# Patient Record
Sex: MALE | Race: WHITE | NOT HISPANIC OR LATINO | Employment: FULL TIME | URBAN - METROPOLITAN AREA
[De-identification: names, ages, dates, MRNs, and addresses within clinical notes are randomized per-mention and may not be internally consistent; named-entity substitution may affect disease eponyms.]

---

## 2017-02-07 ENCOUNTER — TELEPHONE (OUTPATIENT)
Dept: CARDIOLOGY | Facility: CLINIC | Age: 57
End: 2017-02-07

## 2017-02-07 NOTE — TELEPHONE ENCOUNTER
Dayanara ( wife) called pt is scheduled on Thursday 2/9/17 @ 8:00am.   She was wanting to know if you have a later appt after 10:00 am that day or another day.   Call back # [phone]   Thanks Pk SWANN

## 2017-02-08 NOTE — TELEPHONE ENCOUNTER
Called pt to change appt to later in the day.  Pt said yesterday he was told by scheduling that there were no appt until April.  Pt will just keep same appt since they straightened things out for tomorrow.  (done)

## 2017-02-09 ENCOUNTER — HOSPITAL ENCOUNTER (OUTPATIENT)
Facility: HOSPITAL | Age: 57
Setting detail: OBSERVATION
LOS: 1 days | Discharge: HOME | End: 2017-02-11
Attending: INTERNAL MEDICINE | Admitting: INTERNAL MEDICINE

## 2017-02-09 ENCOUNTER — APPOINTMENT (OUTPATIENT)
Dept: CT IMAGING | Facility: HOSPITAL | Age: 57
End: 2017-02-09

## 2017-02-09 ENCOUNTER — OFFICE VISIT (OUTPATIENT)
Dept: CARDIOLOGY | Facility: CLINIC | Age: 57
End: 2017-02-09

## 2017-02-09 ENCOUNTER — TELEPHONE (OUTPATIENT)
Dept: CARDIOLOGY | Facility: CLINIC | Age: 57
End: 2017-02-09

## 2017-02-09 VITALS
SYSTOLIC BLOOD PRESSURE: 108 MMHG | WEIGHT: 211 LBS | DIASTOLIC BLOOD PRESSURE: 78 MMHG | HEIGHT: 70 IN | HEART RATE: 53 BPM | BODY MASS INDEX: 30.21 KG/M2

## 2017-02-09 DIAGNOSIS — K92.2 GASTROINTESTINAL HEMORRHAGE, UNSPECIFIED GASTROINTESTINAL HEMORRHAGE TYPE: Primary | ICD-10-CM

## 2017-02-09 DIAGNOSIS — K92.1 BLOODY STOOL: ICD-10-CM

## 2017-02-09 DIAGNOSIS — I10 ESSENTIAL HYPERTENSION: ICD-10-CM

## 2017-02-09 DIAGNOSIS — I25.110 CORONARY ARTERY DISEASE INVOLVING NATIVE CORONARY ARTERY OF NATIVE HEART WITH UNSTABLE ANGINA PECTORIS (HCC): Primary | ICD-10-CM

## 2017-02-09 DIAGNOSIS — R07.2 PRECORDIAL PAIN: ICD-10-CM

## 2017-02-09 PROBLEM — R07.9 CHEST PAIN: Status: ACTIVE | Noted: 2017-02-09

## 2017-02-09 PROBLEM — I25.10 CAD (CORONARY ARTERY DISEASE): Status: ACTIVE | Noted: 2017-02-09

## 2017-02-09 LAB
ALBUMIN SERPL-MCNC: 4.2 G/DL (ref 3.5–5.2)
ALBUMIN/GLOB SERPL: 1.8 G/DL
ALP SERPL-CCNC: 52 U/L (ref 39–117)
ALT SERPL W P-5'-P-CCNC: 20 U/L (ref 1–41)
ANION GAP SERPL CALCULATED.3IONS-SCNC: 9.8 MMOL/L
AST SERPL-CCNC: 13 U/L (ref 1–40)
BASOPHILS # BLD AUTO: 0.04 10*3/MM3 (ref 0–0.2)
BASOPHILS NFR BLD AUTO: 0.4 % (ref 0–1.5)
BILIRUB SERPL-MCNC: 1.4 MG/DL (ref 0.1–1.2)
BUN BLD-MCNC: 8 MG/DL (ref 6–20)
BUN/CREAT SERPL: 8.8 (ref 7–25)
CALCIUM SPEC-SCNC: 9.4 MG/DL (ref 8.6–10.5)
CHLORIDE SERPL-SCNC: 101 MMOL/L (ref 98–107)
CO2 SERPL-SCNC: 28.2 MMOL/L (ref 22–29)
CREAT BLD-MCNC: 0.91 MG/DL (ref 0.76–1.27)
DEPRECATED RDW RBC AUTO: 42.7 FL (ref 37–54)
EOSINOPHIL # BLD AUTO: 0.5 10*3/MM3 (ref 0–0.7)
EOSINOPHIL NFR BLD AUTO: 5.1 % (ref 0.3–6.2)
ERYTHROCYTE [DISTWIDTH] IN BLOOD BY AUTOMATED COUNT: 13.1 % (ref 11.5–14.5)
FERRITIN SERPL-MCNC: 67.7 NG/ML (ref 30–400)
FOLATE SERPL-MCNC: >20 NG/ML (ref 4.78–24.2)
GFR SERPL CREATININE-BSD FRML MDRD: 87 ML/MIN/1.73
GLOBULIN UR ELPH-MCNC: 2.3 GM/DL
GLUCOSE BLD-MCNC: 89 MG/DL (ref 65–99)
HCT VFR BLD AUTO: 47.3 % (ref 40.4–52.2)
HGB BLD-MCNC: 16.4 G/DL (ref 13.7–17.6)
IMM GRANULOCYTES # BLD: 0.03 10*3/MM3 (ref 0–0.03)
IMM GRANULOCYTES NFR BLD: 0.3 % (ref 0–0.5)
IRON 24H UR-MRATE: 133 MCG/DL (ref 59–158)
IRON SATN MFR SERPL: 38 % (ref 20–50)
LYMPHOCYTES # BLD AUTO: 1.85 10*3/MM3 (ref 0.9–4.8)
LYMPHOCYTES NFR BLD AUTO: 18.7 % (ref 19.6–45.3)
MCH RBC QN AUTO: 31 PG (ref 27–32.7)
MCHC RBC AUTO-ENTMCNC: 34.7 G/DL (ref 32.6–36.4)
MCV RBC AUTO: 89.4 FL (ref 79.8–96.2)
MONOCYTES # BLD AUTO: 0.44 10*3/MM3 (ref 0.2–1.2)
MONOCYTES NFR BLD AUTO: 4.5 % (ref 5–12)
NEUTROPHILS # BLD AUTO: 7.02 10*3/MM3 (ref 1.9–8.1)
NEUTROPHILS NFR BLD AUTO: 71 % (ref 42.7–76)
NT-PROBNP SERPL-MCNC: 47.9 PG/ML (ref 5–900)
PLATELET # BLD AUTO: 225 10*3/MM3 (ref 140–500)
PMV BLD AUTO: 10.2 FL (ref 6–12)
POTASSIUM BLD-SCNC: 4.3 MMOL/L (ref 3.5–5.2)
PROT SERPL-MCNC: 6.5 G/DL (ref 6–8.5)
RBC # BLD AUTO: 5.29 10*6/MM3 (ref 4.6–6)
SODIUM BLD-SCNC: 139 MMOL/L (ref 136–145)
TIBC SERPL-MCNC: 352 MCG/DL
TRANSFERRIN SERPL-MCNC: 236 MG/DL (ref 200–360)
TROPONIN T SERPL-MCNC: <0.01 NG/ML (ref 0–0.03)
VIT B12 BLD-MCNC: 506 PG/ML (ref 211–946)
WBC NRBC COR # BLD: 9.88 10*3/MM3 (ref 4.5–10.7)

## 2017-02-09 PROCEDURE — 82746 ASSAY OF FOLIC ACID SERUM: CPT | Performed by: INTERNAL MEDICINE

## 2017-02-09 PROCEDURE — 82747 ASSAY OF FOLIC ACID RBC: CPT | Performed by: INTERNAL MEDICINE

## 2017-02-09 PROCEDURE — 84484 ASSAY OF TROPONIN QUANT: CPT | Performed by: INTERNAL MEDICINE

## 2017-02-09 PROCEDURE — 84466 ASSAY OF TRANSFERRIN: CPT | Performed by: INTERNAL MEDICINE

## 2017-02-09 PROCEDURE — 82607 VITAMIN B-12: CPT | Performed by: INTERNAL MEDICINE

## 2017-02-09 PROCEDURE — G0378 HOSPITAL OBSERVATION PER HR: HCPCS

## 2017-02-09 PROCEDURE — 85014 HEMATOCRIT: CPT | Performed by: INTERNAL MEDICINE

## 2017-02-09 PROCEDURE — 93000 ELECTROCARDIOGRAM COMPLETE: CPT | Performed by: INTERNAL MEDICINE

## 2017-02-09 PROCEDURE — 99220 PR INITIAL OBSERVATION CARE/DAY 70 MINUTES: CPT | Performed by: INTERNAL MEDICINE

## 2017-02-09 PROCEDURE — 80053 COMPREHEN METABOLIC PANEL: CPT | Performed by: INTERNAL MEDICINE

## 2017-02-09 PROCEDURE — 99244 OFF/OP CNSLTJ NEW/EST MOD 40: CPT | Performed by: INTERNAL MEDICINE

## 2017-02-09 PROCEDURE — 83880 ASSAY OF NATRIURETIC PEPTIDE: CPT | Performed by: INTERNAL MEDICINE

## 2017-02-09 PROCEDURE — 82728 ASSAY OF FERRITIN: CPT | Performed by: INTERNAL MEDICINE

## 2017-02-09 PROCEDURE — 74177 CT ABD & PELVIS W/CONTRAST: CPT

## 2017-02-09 PROCEDURE — 0 IOPAMIDOL 61 % SOLUTION: Performed by: INTERNAL MEDICINE

## 2017-02-09 PROCEDURE — 93010 ELECTROCARDIOGRAM REPORT: CPT | Performed by: INTERNAL MEDICINE

## 2017-02-09 PROCEDURE — 93005 ELECTROCARDIOGRAM TRACING: CPT | Performed by: INTERNAL MEDICINE

## 2017-02-09 PROCEDURE — 85025 COMPLETE CBC W/AUTO DIFF WBC: CPT | Performed by: INTERNAL MEDICINE

## 2017-02-09 PROCEDURE — 83540 ASSAY OF IRON: CPT | Performed by: INTERNAL MEDICINE

## 2017-02-09 RX ORDER — METOPROLOL SUCCINATE 50 MG/1
50 TABLET, EXTENDED RELEASE ORAL DAILY
Status: DISCONTINUED | OUTPATIENT
Start: 2017-02-09 | End: 2017-02-11 | Stop reason: HOSPADM

## 2017-02-09 RX ORDER — DIPHENOXYLATE HYDROCHLORIDE AND ATROPINE SULFATE 2.5; .025 MG/1; MG/1
1 TABLET ORAL DAILY
Status: DISCONTINUED | OUTPATIENT
Start: 2017-02-09 | End: 2017-02-11 | Stop reason: HOSPADM

## 2017-02-09 RX ORDER — ATORVASTATIN CALCIUM 10 MG/1
10 TABLET, FILM COATED ORAL NIGHTLY
Status: DISCONTINUED | OUTPATIENT
Start: 2017-02-09 | End: 2017-02-11 | Stop reason: HOSPADM

## 2017-02-09 RX ORDER — LANOLIN ALCOHOL/MO/W.PET/CERES
800 CREAM (GRAM) TOPICAL DAILY
Status: DISCONTINUED | OUTPATIENT
Start: 2017-02-09 | End: 2017-02-11 | Stop reason: HOSPADM

## 2017-02-09 RX ORDER — ISOSORBIDE MONONITRATE 30 MG/1
30 TABLET, EXTENDED RELEASE ORAL DAILY
Status: DISCONTINUED | OUTPATIENT
Start: 2017-02-09 | End: 2017-02-11 | Stop reason: HOSPADM

## 2017-02-09 RX ORDER — ASPIRIN 81 MG/1
81 TABLET, CHEWABLE ORAL DAILY
Status: DISCONTINUED | OUTPATIENT
Start: 2017-02-09 | End: 2017-02-11 | Stop reason: HOSPADM

## 2017-02-09 RX ORDER — LISINOPRIL 10 MG/1
10 TABLET ORAL DAILY
Status: DISCONTINUED | OUTPATIENT
Start: 2017-02-09 | End: 2017-02-11 | Stop reason: HOSPADM

## 2017-02-09 RX ORDER — POLYETHYLENE GLYCOL 3350, SODIUM CHLORIDE, POTASSIUM CHLORIDE, SODIUM BICARBONATE, AND SODIUM SULFATE 240; 5.84; 2.98; 6.72; 22.72 G/4L; G/4L; G/4L; G/4L; G/4L
2000 POWDER, FOR SOLUTION ORAL 2 TIMES DAILY
Status: COMPLETED | OUTPATIENT
Start: 2017-02-09 | End: 2017-02-10

## 2017-02-09 RX ORDER — SODIUM CHLORIDE 0.9 % (FLUSH) 0.9 %
1-10 SYRINGE (ML) INJECTION AS NEEDED
Status: DISCONTINUED | OUTPATIENT
Start: 2017-02-09 | End: 2017-02-11 | Stop reason: HOSPADM

## 2017-02-09 RX ADMIN — POLYETHYLENE GLYCOL 3350, SODIUM CHLORIDE, POTASSIUM CHLORIDE, SODIUM BICARBONATE, AND SODIUM SULFATE 2000 ML: 240; 5.84; 2.98; 6.72; 22.72 POWDER, FOR SOLUTION ORAL at 21:00

## 2017-02-09 RX ADMIN — LISINOPRIL 10 MG: 10 TABLET ORAL at 20:59

## 2017-02-09 RX ADMIN — ATORVASTATIN CALCIUM 10 MG: 10 TABLET, FILM COATED ORAL at 20:59

## 2017-02-09 RX ADMIN — IOPAMIDOL 85 ML: 612 INJECTION, SOLUTION INTRAVENOUS at 19:45

## 2017-02-09 NOTE — PROGRESS NOTES
Date of Office Visit: 2017  Encounter Provider: Hina Charles MD  Place of Service: [facility] CARDIOLOGY  Patient Name: Kev Mae  :1960    Chief complaint      History of Present Illness  The patient is a pleasant 56-year-old gentleman with hypertension, hyperlipidemia, carotid  artery disease, and coronary artery disease. In , he underwent coronary artery bypass  grafting. In  he had shortness of breath and palpitations. A stress test revealed  ST-T wave changes and apical ischemia along with normal systolic function. Cardiac  catheterization revealed inferobasilar dyskinesis with an 80% stenosis of the circumflex  artery. The right coronary artery had a 50-90% stenosis. The LIMA graft was occluded  proximally. The vein graft to the obtuse marginal branch was patent. The vein graft to  the right coronary artery was occluded. There was severe two-vessel disease (circumflex  and right coronary artery). The circumflex had native disease. The vein graft to he right  coronary artery was not patent. Angioplasty was considered but felt to be less than ideal  due to the size of this artery. Medical therapy was pursued. He then in early   developed some diplopia and had a 24-hour Holter monitor which was unremarkable except for  some PACs. In 7/15 he had a followup stress echo that showed normal left ventricle size and systolic function ejection fraction 56% grade 1 diastolic dysfunction, mild mitral regurgitation with abnormal stress echo with ischemia in the anterior wall.  He underwent cardiac catheterization that revealed moderate disease in the native LAD that was not obstructive.  The bypasses circumflex and right coronary artery had had a prior occlusion had revascularized.  He was treated medically.    He has not returned for follow-up since then.  He states he has at times missed taking many medications as he has forgotten.  He has had secondhand smoke  from his wife.  His lipids have not been checked in a year.  Despite this overall he had done relatively well until recently.  He has had intermittent episodes of black stools.  In December 2000 and fifth 16 he underwent hernia surgery by description this is a pleasant abdominal hernia repair with mesh being placed in the left lower abdomen.  He had recovered from this visit but has had recurrent lower abdominal pain intermittently over the past 3-4 weeks.  He has also had intermittent black stools especially in the past 2 days.  In this situation he has also had recurrent chest pain.  He states approximately 2-1/2 weeks ago he had midsternal chest pain was he was upset and angry this lasted for 10-15 minutes and resolved.  Genera 23rd of 2017 while he got dressed and was driving to work he developed a similar but more intense midsternal chest pain associated with nausea diaphoresis profuse sweating and weakness.  He was brought to the emergency room by paramedics.  Nitroglycerin relieved his pain and Route.  ER evaluation included negative troponin's no acute changes on his EKG.  A fasting glucose was 127.  He does not recall having had a chest x-ray but x-ray report showed no acute findings.  His hemoglobin was normal.  He was instructed to subsequently make an appointment.  He has had no interim symptoms since then.    Past Medical History   Diagnosis Date   • CAD (coronary artery disease)    • Carotid artery plaque    • Chest pain    • Diaphoresis    • Diplopia    • Hyperlipidemia    • Hypertension    • Nausea    • Past myocardial infarction    • SOB (shortness of breath)      Past Surgical History   Procedure Laterality Date   • Coronary artery bypass graft       X3   • Abdominal surgery       HERNIA REPAIR   • Rotator cuff repair     • Amputation digit       X2     No facility-administered medications prior to visit.      Outpatient Medications Prior to Visit   Medication Sig Dispense Refill   • aspirin 81 MG  tablet Take 1 tablet by mouth Daily.     • folic acid (FOLVITE) 800 MCG tablet Take 1 tablet by mouth Daily. As directed     • isosorbide mononitrate (IMDUR) 30 MG 24 hr tablet Take 1 tablet by mouth Daily.     • lisinopril (PRINIVIL,ZESTRIL) 10 MG tablet Take 1 tablet by mouth Daily.     • metoprolol succinate XL (TOPROL XL) 50 MG 24 hr tablet Take 1 tablet by mouth Daily.     • simvastatin (ZOCOR) 20 MG tablet Take 1 tablet by mouth Daily.       Allergies as of 02/09/2017   • (No Known Allergies)     Social History     Social History   • Marital status:      Spouse name: N/A   • Number of children: N/A   • Years of education: N/A     Occupational History   • Not on file.     Social History Main Topics   • Smoking status: Former Smoker     Quit date: 2/9/2009   • Smokeless tobacco: Not on file   • Alcohol use Yes      Comment: social drinker   • Drug use: Not on file   • Sexual activity: Not on file     Other Topics Concern   • Not on file     Social History Narrative     Family History   Problem Relation Age of Onset   • Heart attack Mother    • Heart attack Brother    • Deep vein thrombosis Cousin      with embolic death     Review of Systems   Constitution: Negative for fever, malaise/fatigue, weight gain and weight loss.   HENT: Negative for ear pain, hearing loss, nosebleeds and sore throat.    Eyes: Negative for double vision, pain, vision loss in left eye and vision loss in right eye.   Cardiovascular:        See history of present illness.   Respiratory: Positive for shortness of breath. Negative for cough, sleep disturbances due to breathing, snoring and wheezing.    Endocrine: Negative for cold intolerance, heat intolerance and polyuria.   Skin: Negative for itching, poor wound healing and rash.   Musculoskeletal: Negative for joint pain, joint swelling and myalgias.   Gastrointestinal: Negative for abdominal pain, diarrhea, hematochezia, nausea and vomiting.   Genitourinary: Negative for hematuria  "and hesitancy.   Neurological: Positive for dizziness. Negative for numbness, paresthesias and seizures.   Psychiatric/Behavioral: Negative for depression. The patient is not nervous/anxious.      Objective:     Vitals:    02/09/17 0804   BP: 108/78   Pulse: 53   Weight: 211 lb (95.7 kg)   Height: 70\" (177.8 cm)     Body mass index is 30.28 kg/(m^2).    Physical Exam   Constitutional: He is oriented to person, place, and time. He appears well-developed and well-nourished.   HENT:   Head: Normocephalic.   Nose: Nose normal.   Mouth/Throat: Oropharynx is clear and moist.   Eyes: Conjunctivae and EOM are normal. Pupils are equal, round, and reactive to light. Right eye exhibits no discharge. No scleral icterus.   Neck: Normal range of motion. Neck supple. No JVD present. No thyromegaly present.   Cardiovascular: Normal rate, regular rhythm, normal heart sounds and intact distal pulses.  Exam reveals no gallop and no friction rub.    No murmur heard.  Pulses:       Carotid pulses are 2+ on the right side, and 2+ on the left side.       Radial pulses are 2+ on the right side, and 2+ on the left side.        Femoral pulses are 2+ on the right side, and 2+ on the left side.       Popliteal pulses are 2+ on the right side, and 2+ on the left side.        Dorsalis pedis pulses are 2+ on the right side, and 2+ on the left side.        Posterior tibial pulses are 2+ on the right side, and 2+ on the left side.   Pulmonary/Chest: Effort normal and breath sounds normal. No respiratory distress. He has no wheezes. He has no rales.   Abdominal: Soft. Bowel sounds are normal. He exhibits no distension. There is no hepatosplenomegaly. There is no tenderness. There is no rebound.   Musculoskeletal: Normal range of motion. He exhibits no edema or tenderness.   Neurological: He is alert and oriented to person, place, and time.   Skin: Skin is warm and dry. No rash noted. No erythema.   Psychiatric: He has a normal mood and affect. His " behavior is normal. Judgment and thought content normal.   Vitals reviewed.    Lab Review:     ECG 12 Lead  Date/Time: 2/9/2017 10:05 PM  Performed by: MAGGIE CALL  Authorized by: MAGGIE CALL   Comparison: compared with previous ECG   Similar to previous ECG  Rhythm: sinus rhythm  Conduction: non-specific intraventricular conduction delay  Conduction comments: QTc =410 msec  Clinical impression: abnormal ECG          Assessment:       Diagnosis Plan   1. Coronary artery disease involving native coronary artery of native heart with unstable angina pectoris     2. Essential hypertension     3. Precordial pain     4. Bloody stool       Plan:       1.  Recurrent chest pain consistent with unstable angina unfortunately also associated with bleed.  We'll admit atrial fibrillation to hospital and asked GI to review and we'll need to consider cardiac catheterization unless he develops severe anemia which is doubtful.   2.  Coronary artery disease with history of coronary artery bypass grafting, as above  3.  Black stools concerning for GI bleed.  We'll asked GI to review of note is that he had a colonoscopy a year ago and had polyps that was subsequently removed.  He also had recent hernia surgery as described above  4.  Hernia surgery as above  5.  History of colonic polyps  6.  Dyslipidemia  7.  Elevated glucose we'll check hemoglobin A1c and serial Accu-Cheks and hospital  8.  Hypertension  9.  Secondary smoke of been exposure.  I discussed with the patient and wife that this should be avoided hopefully they can make some lifestyle changes      Kev Mae   Home Medication Instructions POOL:    Printed on:02/09/17 7538   Medication Information                      aspirin 81 MG tablet  Take 1 tablet by mouth Daily.             folic acid (FOLVITE) 800 MCG tablet  Take 1 tablet by mouth Daily. As directed             isosorbide mononitrate (IMDUR) 30 MG 24 hr tablet  Take 1 tablet by mouth Daily.              lisinopril (PRINIVIL,ZESTRIL) 10 MG tablet  Take 1 tablet by mouth Daily.             metoprolol succinate XL (TOPROL XL) 50 MG 24 hr tablet  Take 1 tablet by mouth Daily.             MULTIPLE VITAMIN PO  Take  by mouth.             simvastatin (ZOCOR) 20 MG tablet  Take 1 tablet by mouth Daily.               EMR Dragon/Transcription disclaimer:   Much of this encounter note is an electronic transcription/translation of spoken language to printed text. The electronic translation of spoken language may permit erroneous, or at times, nonsensical words or phrases to be inadvertently transcribed; Although I have reviewed the note for such errors, some may still exist.

## 2017-02-09 NOTE — CONSULTS
[facility]  Initial Inpatient Consult Note    Referring Provider: Dr Charles    Reason for Consultation: anemia    Subjective     History of present illness:  Pleasant 56-year-old male with a history of coronary artery disease, hernia repair 12/29/16, and a history of colon polyps admitted for seeing dark blood in his stool.  He was seen in the office visit today by Dr. Charles for recurrent chest pain with plans for a cardiac catheterization.  He reported maroon, dark red blood per rectum ×3 days and lower abdominal pain intermittently for 2 to 3 months but increasing in frequency.  He states for the past 3 mornings he has had dark, red blood coating his stool.  His abdominal pain is described as a dull ache with no specific triggering or alleviating factors. His current hemoglobin is 16.4 with a hematocrit of 47.3, and a normal WBC.  He takes low-dose aspirin daily but denies NSAID use or alcohol use.  He denies dysphagia, odynophagia, heartburn, reflux, weight loss, fever, chills, black tarry stool, rectal pain, or weight loss.  He denies a family history of colon cancer.  His last colonoscopy was 2-3 years ago at [facility] which he states was normal except for 2-3 polyps.  His current hemoglobin is 16.4 with a hematocrit of 47.3, and a normal WBC.     Past Medical History:  Past Medical History   Diagnosis Date   • CAD (coronary artery disease)    • Carotid artery plaque    • Chest heaviness    • Chest pain    • Diaphoresis    • Diplopia    • Hyperlipidemia    • Hypertension    • Nausea    • Past myocardial infarction    • SOB (shortness of breath)        Past Surgical History:  Past Surgical History   Procedure Laterality Date   • Coronary artery bypass graft       X3   • Abdominal surgery       HERNIA REPAIR   • Rotator cuff repair     • Amputation digit       X2        Social History:   Social History   Substance Use Topics   • Smoking status: Former Smoker     Quit date:  2/9/2009   • Smokeless tobacco: Not on file   • Alcohol use Yes      Comment: social drinker        Family History:  Family History   Problem Relation Age of Onset   • Heart attack Mother    • Heart attack Brother    • Deep vein thrombosis Cousin      with embolic death       Home Meds:  Prescriptions Prior to Admission   Medication Sig Dispense Refill Last Dose   • aspirin 81 MG tablet Take 1 tablet by mouth Daily.   2/8/2017 at Unknown time   • folic acid (FOLVITE) 800 MCG tablet Take 1 tablet by mouth Daily. As directed   2/8/2017 at Unknown time   • isosorbide mononitrate (IMDUR) 30 MG 24 hr tablet Take 1 tablet by mouth Daily.   2/8/2017 at Unknown time   • lisinopril (PRINIVIL,ZESTRIL) 10 MG tablet Take 1 tablet by mouth Daily.   2/8/2017 at Unknown time   • metoprolol succinate XL (TOPROL XL) 50 MG 24 hr tablet Take 1 tablet by mouth Daily.   2/8/2017 at Unknown time   • MULTIPLE VITAMIN PO Take  by mouth.   2/8/2017 at Unknown time   • simvastatin (ZOCOR) 20 MG tablet Take 1 tablet by mouth Daily.   2/8/2017 at Unknown time       Current Meds:     aspirin 81 mg Oral Daily   atorvastatin 10 mg Oral Nightly   folic acid 800 mcg Oral Daily   isosorbide mononitrate 30 mg Oral Daily   lisinopril 10 mg Oral Daily   metoprolol succinate XL 50 mg Oral Daily   multivitamin 1 tablet Oral Daily   polyethylene glycol with electrolytes 2,000 mL Oral BID       Allergies:  No Known Allergies    Review of Systems  All systems were reviewed and negative except for:  Gastrointestinal: postitive for  bright red blood per rectum and pain     Objective     Vital Signs  Temp:  [97.7 °F (36.5 °C)] 97.7 °F (36.5 °C)  Heart Rate:  [53-56] 56  Resp:  [16] 16  BP: (108-121)/(78-88) 121/88    Physical Exam:  General Appearance:    Alert, cooperative, in no acute distress   Head:    Normocephalic, without obvious abnormality, atraumatic   Eyes:            Lids and lashes normal, conjunctivae and sclerae normal, no   icterus   Throat:    No oral lesions, no thrush, oral mucosa moist   Neck:   No adenopathy, supple, trachea midline, no thyromegaly   Lungs:     Clear to auscultation,respirations regular, even and                   unlabored    Heart:    Regular rhythm and normal rate, normal S1 and S2, no            murmur, no gallop, no rub, no click   Chest Wall:    Prior sternotomy   Abdomen:     Normal bowel sounds, no masses, no organomegaly, soft        non-tender, non-distended, no guarding, no rebound                 tenderness   Rectal:     Deferred   Extremities:   no edema, no cyanosis, no redness   Skin:   No bleeding, bruising or rash   Lymph nodes:   No palpable adenopathy   Psychiatric:  Judgement and insight: normal   Orientation to person place and time: normal   Mood and affect: normal     Results Review:   I reviewed the patient's new clinical results.      Results from last 7 days  Lab Units 02/09/17  1305   WBC 10*3/mm3 9.88   HEMOGLOBIN g/dL 16.4   HEMATOCRIT % 47.3   PLATELETS 10*3/mm3 225         Results from last 7 days  Lab Units 02/09/17  1400   SODIUM mmol/L 139   POTASSIUM mmol/L 4.3   CHLORIDE mmol/L 101   TOTAL CO2 mmol/L 28.2   BUN mg/dL 8   CREATININE mg/dL 0.91   CALCIUM mg/dL 9.4   BILIRUBIN mg/dL 1.4*   ALK PHOS U/L 52   ALT (SGPT) U/L 20   AST (SGOT) U/L 13   GLUCOSE mg/dL 89             No results found for: LIPASE    Radiology:  Imaging Results (last 72 hours)     ** No results found for the last 72 hours. **          Assessment/Plan     Active Problems:    * No active hospital problems. *      1) maroon blood coating stool: Suggestive of anal outlet issue including hemorrhoids versus fissure versus ulcer.  Reassuringly, his hemoglobin is stable.    2) intermittent lower abdominal pain: He is status post hernia repair, I wonder if this is related to that    3) CAD status post CABG: Plans for cardiac catheterization    4) history of colon polyps: Colonoscopy 2-3 years ago    Plan:  -With upcoming plans for cardiac  catheterization, Dr. Charles would like evaluation of the lower GI bleed prior to undergoing this procedure.  I discussed the colonoscopy, the prep, the risks and benefits and the patient agrees to proceed  -Will get records of his last colonoscopy  -Daily H&H  -CT scan to evaluate abdominal pain  -No plans for EGD evaluation as he denies any upper GI symptoms, has no anemia, and does not describe melena    I discussed the patients findings and my recommendations with patient    Concepción Taylor MD  [facility]  02/09/17

## 2017-02-09 NOTE — H&P
Please refer to office note from today and apply to H and P.     Patient admitted with USA and anemia    Dr. Hina Charles

## 2017-02-09 NOTE — PLAN OF CARE
Problem: Patient Care Overview (Adult)  Goal: Plan of Care Review  Outcome: Ongoing (interventions implemented as appropriate)    02/09/17 9824   Coping/Psychosocial Response Interventions   Plan Of Care Reviewed With patient;spouse   Patient Care Overview   Progress no change   Outcome Evaluation   Outcome Summary/Follow up Plan pt admitted to  for angina and GIB. plan for cscope tomorrow and possible heart cath tomorrow as well. VSS. continue to monitor       Goal: Adult Individualization and Mutuality  Outcome: Ongoing (interventions implemented as appropriate)  Goal: Discharge Needs Assessment  Outcome: Ongoing (interventions implemented as appropriate)    Problem: Gastrointestinal Bleeding (Adult)  Goal: Signs and Symptoms of Listed Potential Problems Will be Absent or Manageable (Gastrointestinal Bleeding)  Outcome: Ongoing (interventions implemented as appropriate)    Problem: Pain, Acute (Adult)  Goal: Identify Related Risk Factors and Signs and Symptoms  Outcome: Outcome(s) achieved Date Met:  02/09/17  Goal: Acceptable Pain Control/Comfort Level  Outcome: Ongoing (interventions implemented as appropriate)

## 2017-02-10 ENCOUNTER — ANESTHESIA (OUTPATIENT)
Dept: GASTROENTEROLOGY | Facility: HOSPITAL | Age: 57
End: 2017-02-10

## 2017-02-10 ENCOUNTER — ANESTHESIA EVENT (OUTPATIENT)
Dept: GASTROENTEROLOGY | Facility: HOSPITAL | Age: 57
End: 2017-02-10

## 2017-02-10 LAB
ALBUMIN SERPL-MCNC: 3.9 G/DL (ref 3.5–5.2)
ALBUMIN/GLOB SERPL: 1.7 G/DL
ALP SERPL-CCNC: 50 U/L (ref 39–117)
ALT SERPL W P-5'-P-CCNC: 18 U/L (ref 1–41)
ANION GAP SERPL CALCULATED.3IONS-SCNC: 10.9 MMOL/L
AST SERPL-CCNC: 19 U/L (ref 1–40)
BASOPHILS # BLD AUTO: 0.04 10*3/MM3 (ref 0–0.2)
BASOPHILS NFR BLD AUTO: 0.5 % (ref 0–1.5)
BILIRUB SERPL-MCNC: 1.7 MG/DL (ref 0.1–1.2)
BUN BLD-MCNC: 8 MG/DL (ref 6–20)
BUN/CREAT SERPL: 8.2 (ref 7–25)
CALCIUM SPEC-SCNC: 9.3 MG/DL (ref 8.6–10.5)
CHLORIDE SERPL-SCNC: 101 MMOL/L (ref 98–107)
CO2 SERPL-SCNC: 27.1 MMOL/L (ref 22–29)
CREAT BLD-MCNC: 0.97 MG/DL (ref 0.76–1.27)
DEPRECATED RDW RBC AUTO: 43.6 FL (ref 37–54)
EOSINOPHIL # BLD AUTO: 0.46 10*3/MM3 (ref 0–0.7)
EOSINOPHIL NFR BLD AUTO: 6.1 % (ref 0.3–6.2)
ERYTHROCYTE [DISTWIDTH] IN BLOOD BY AUTOMATED COUNT: 13.2 % (ref 11.5–14.5)
FOLATE BLD-MCNC: >620 NG/ML
FOLATE RBC-MCNC: >1333 NG/ML
GFR SERPL CREATININE-BSD FRML MDRD: 81 ML/MIN/1.73
GLOBULIN UR ELPH-MCNC: 2.3 GM/DL
GLUCOSE BLD-MCNC: 95 MG/DL (ref 65–99)
HBA1C MFR BLD: 5.08 % (ref 4.8–5.6)
HCT VFR BLD AUTO: 46.4 % (ref 40.4–52.2)
HCT VFR BLD AUTO: 46.5 % (ref 37.5–51)
HGB BLD-MCNC: 15.9 G/DL (ref 13.7–17.6)
IMM GRANULOCYTES # BLD: 0 10*3/MM3 (ref 0–0.03)
IMM GRANULOCYTES NFR BLD: 0 % (ref 0–0.5)
INR PPP: 1.02 (ref 0.9–1.1)
LYMPHOCYTES # BLD AUTO: 1.44 10*3/MM3 (ref 0.9–4.8)
LYMPHOCYTES NFR BLD AUTO: 19 % (ref 19.6–45.3)
MCH RBC QN AUTO: 31.5 PG (ref 27–32.7)
MCHC RBC AUTO-ENTMCNC: 34.3 G/DL (ref 32.6–36.4)
MCV RBC AUTO: 91.9 FL (ref 79.8–96.2)
MONOCYTES # BLD AUTO: 0.49 10*3/MM3 (ref 0.2–1.2)
MONOCYTES NFR BLD AUTO: 6.5 % (ref 5–12)
NEUTROPHILS # BLD AUTO: 5.13 10*3/MM3 (ref 1.9–8.1)
NEUTROPHILS NFR BLD AUTO: 67.9 % (ref 42.7–76)
PLATELET # BLD AUTO: 183 10*3/MM3 (ref 140–500)
PMV BLD AUTO: 10.5 FL (ref 6–12)
POTASSIUM BLD-SCNC: 4.3 MMOL/L (ref 3.5–5.2)
PROT SERPL-MCNC: 6.2 G/DL (ref 6–8.5)
PROTHROMBIN TIME: 13 SECONDS (ref 11.7–14.2)
RBC # BLD AUTO: 5.05 10*6/MM3 (ref 4.6–6)
SODIUM BLD-SCNC: 139 MMOL/L (ref 136–145)
WBC NRBC COR # BLD: 7.56 10*3/MM3 (ref 4.5–10.7)

## 2017-02-10 PROCEDURE — 99224 PR SBSQ OBSERVATION CARE/DAY 15 MINUTES: CPT | Performed by: INTERNAL MEDICINE

## 2017-02-10 PROCEDURE — G0378 HOSPITAL OBSERVATION PER HR: HCPCS

## 2017-02-10 PROCEDURE — 85610 PROTHROMBIN TIME: CPT | Performed by: NURSE PRACTITIONER

## 2017-02-10 PROCEDURE — 94799 UNLISTED PULMONARY SVC/PX: CPT

## 2017-02-10 PROCEDURE — 25010000002 FENTANYL CITRATE (PF) 100 MCG/2ML SOLUTION: Performed by: INTERNAL MEDICINE

## 2017-02-10 PROCEDURE — 99226 PR SBSQ OBSERVATION CARE/DAY 35 MINUTES: CPT | Performed by: INTERNAL MEDICINE

## 2017-02-10 PROCEDURE — 0 IOPAMIDOL PER 1 ML: Performed by: INTERNAL MEDICINE

## 2017-02-10 PROCEDURE — 80053 COMPREHEN METABOLIC PANEL: CPT | Performed by: NURSE PRACTITIONER

## 2017-02-10 PROCEDURE — 93459 L HRT ART/GRFT ANGIO: CPT | Performed by: INTERNAL MEDICINE

## 2017-02-10 PROCEDURE — 25010000002 HEPARIN (PORCINE) PER 1000 UNITS: Performed by: INTERNAL MEDICINE

## 2017-02-10 PROCEDURE — 85025 COMPLETE CBC W/AUTO DIFF WBC: CPT | Performed by: NURSE PRACTITIONER

## 2017-02-10 PROCEDURE — 25010000002 MIDAZOLAM PER 1 MG: Performed by: INTERNAL MEDICINE

## 2017-02-10 PROCEDURE — 83036 HEMOGLOBIN GLYCOSYLATED A1C: CPT | Performed by: INTERNAL MEDICINE

## 2017-02-10 PROCEDURE — 85347 COAGULATION TIME ACTIVATED: CPT

## 2017-02-10 PROCEDURE — C1769 GUIDE WIRE: HCPCS | Performed by: INTERNAL MEDICINE

## 2017-02-10 PROCEDURE — 99152 MOD SED SAME PHYS/QHP 5/>YRS: CPT | Performed by: INTERNAL MEDICINE

## 2017-02-10 PROCEDURE — C1894 INTRO/SHEATH, NON-LASER: HCPCS | Performed by: INTERNAL MEDICINE

## 2017-02-10 RX ORDER — MIDAZOLAM HYDROCHLORIDE 1 MG/ML
INJECTION INTRAMUSCULAR; INTRAVENOUS AS NEEDED
Status: DISCONTINUED | OUTPATIENT
Start: 2017-02-10 | End: 2017-02-10 | Stop reason: HOSPADM

## 2017-02-10 RX ORDER — FENTANYL CITRATE 50 UG/ML
INJECTION, SOLUTION INTRAMUSCULAR; INTRAVENOUS AS NEEDED
Status: DISCONTINUED | OUTPATIENT
Start: 2017-02-10 | End: 2017-02-10 | Stop reason: HOSPADM

## 2017-02-10 RX ORDER — SODIUM CHLORIDE 9 MG/ML
INJECTION, SOLUTION INTRAVENOUS CONTINUOUS PRN
Status: DISCONTINUED | OUTPATIENT
Start: 2017-02-10 | End: 2017-02-10 | Stop reason: HOSPADM

## 2017-02-10 RX ORDER — LIDOCAINE HYDROCHLORIDE 20 MG/ML
INJECTION, SOLUTION INFILTRATION; PERINEURAL AS NEEDED
Status: DISCONTINUED | OUTPATIENT
Start: 2017-02-10 | End: 2017-02-10 | Stop reason: HOSPADM

## 2017-02-10 RX ORDER — SODIUM CHLORIDE 9 MG/ML
100 INJECTION, SOLUTION INTRAVENOUS CONTINUOUS
Status: DISCONTINUED | OUTPATIENT
Start: 2017-02-10 | End: 2017-02-10

## 2017-02-10 RX ORDER — SODIUM CHLORIDE, SODIUM LACTATE, POTASSIUM CHLORIDE, CALCIUM CHLORIDE 600; 310; 30; 20 MG/100ML; MG/100ML; MG/100ML; MG/100ML
30 INJECTION, SOLUTION INTRAVENOUS CONTINUOUS PRN
Status: DISCONTINUED | OUTPATIENT
Start: 2017-02-10 | End: 2017-02-10 | Stop reason: HOSPADM

## 2017-02-10 RX ORDER — SODIUM CHLORIDE 0.9 % (FLUSH) 0.9 %
1-10 SYRINGE (ML) INJECTION AS NEEDED
Status: DISCONTINUED | OUTPATIENT
Start: 2017-02-10 | End: 2017-02-11 | Stop reason: HOSPADM

## 2017-02-10 RX ORDER — SODIUM CHLORIDE 0.9 % (FLUSH) 0.9 %
1-10 SYRINGE (ML) INJECTION AS NEEDED
Status: DISCONTINUED | OUTPATIENT
Start: 2017-02-10 | End: 2017-02-10 | Stop reason: HOSPADM

## 2017-02-10 RX ADMIN — SODIUM CHLORIDE, POTASSIUM CHLORIDE, SODIUM LACTATE AND CALCIUM CHLORIDE 30 ML/HR: 600; 310; 30; 20 INJECTION, SOLUTION INTRAVENOUS at 12:50

## 2017-02-10 RX ADMIN — POLYETHYLENE GLYCOL 3350, SODIUM CHLORIDE, POTASSIUM CHLORIDE, SODIUM BICARBONATE, AND SODIUM SULFATE 2000 ML: 240; 5.84; 2.98; 6.72; 22.72 POWDER, FOR SOLUTION ORAL at 04:30

## 2017-02-10 RX ADMIN — Medication 1 TABLET: at 20:40

## 2017-02-10 RX ADMIN — ATORVASTATIN CALCIUM 10 MG: 10 TABLET, FILM COATED ORAL at 20:41

## 2017-02-10 RX ADMIN — ACETAMINOPHEN 800 MCG: 400 TABLET ORAL at 20:41

## 2017-02-10 RX ADMIN — ISOSORBIDE MONONITRATE 30 MG: 30 TABLET, EXTENDED RELEASE ORAL at 20:41

## 2017-02-10 RX ADMIN — ASPIRIN 81 MG: 81 TABLET, CHEWABLE ORAL at 20:40

## 2017-02-10 NOTE — PLAN OF CARE
Problem: Patient Care Overview (Adult)  Goal: Discharge Needs Assessment  Outcome: Ongoing (interventions implemented as appropriate)    02/10/17 3494   Discharge Needs Assessment   Concerns To Be Addressed no discharge needs identified

## 2017-02-10 NOTE — PLAN OF CARE
Problem: GI Endoscopy (Adult)  Goal: Signs and Symptoms of Listed Potential Problems Will be Absent or Manageable (GI Endoscopy)  Outcome: Ongoing (interventions implemented as appropriate)    02/10/17 1245   GI Endoscopy   Problems Assessed (GI Endoscopy) all   Problems Present (GI Endoscopy) none

## 2017-02-10 NOTE — DISCHARGE INSTRUCTIONS
[facility]  [address] [place] [address]    Coronary Angiogram (Radial/Ulnar Approach) After Care    Refer to this sheet in the next few weeks. These instructions provide you with information on caring for yourself after your procedure. Your caregiver may also give you more specific instructions. Your treatment has been planned according to current medical practices, but problems sometimes occur. Call your caregiver if you have any problems or questions after your procedure.    Home Care Instructions:  · You may shower the day after the procedure. Remove the bandage (dressing) and gently wash the site with plain soap and water. Gently pat the site dry. You may apply a band aid daily for 2 days if desired.    · Do not apply powder or lotion to the site.  · Do not submerge the affected site in water for 3 to 5 days or until the site is completely healed.   · Do not lift, push or pull anything over 10 pounds for 2 days after your procedure.  · Inspect the site at least twice daily. You may notice some bruising at the site and it may be tender for 1 to 2 weeks.     · Increase your fluid intake for the next 2 days.    · Keep arm elevated for 24 hours. For the remainder of the day, keep your arm in “Pledge of Allegiance” position when up and about.     · You may drive 24 hours after the procedure unless otherwise instructed by your caregiver.  · Do not operate machinery or power tools for 24 hours.  · A responsible adult should be with you for the first 24 hours after you arrive home. Do not make any important legal decisions or sign legal papers for 24 hours.      Call Your Doctor if:   · You have unusual pain at the radial/ulnar (wrist) site.  · You have redness, warmth, swelling, or pain at the radial/ulnar (wrist) site.  · You have drainage (other than a small amount of blood on the dressing).  · You have chills or a fever > 101.  · Your arm becomes pale or dark, cool, tingly, or numb.  · You  have heavy bleeding from the site, hold pressure on the site for 20 minutes.  If the bleeding stops, apply a fresh bandage and call your cardiologist.  However, if you continue to have bleeding, call 911.

## 2017-02-10 NOTE — PLAN OF CARE
Problem: Cardiac Catheterization with/without PCI (Adult)  Goal: Signs and Symptoms of Listed Potential Problems Will be Absent or Manageable (Cardiac Catheterization with/without PCI)  Outcome: Ongoing (interventions implemented as appropriate)    02/10/17 9345   Cardiac Catheterization with/without PCI   Problems Assessed (Cardiac Catheterization) all   Problems Present (Cardiac Catheterization) none

## 2017-02-10 NOTE — PLAN OF CARE
Problem: Patient Care Overview (Adult)  Goal: Plan of Care Review  Outcome: Ongoing (interventions implemented as appropriate)    02/10/17 7194   Coping/Psychosocial Response Interventions   Plan Of Care Reviewed With patient;spouse   Patient Care Overview   Progress improving   Outcome Evaluation   Outcome Summary/Follow up Plan ready for transfer

## 2017-02-10 NOTE — PLAN OF CARE
Problem: Patient Care Overview (Adult)  Goal: Plan of Care Review  Outcome: Ongoing (interventions implemented as appropriate)    02/09/17 1625 02/10/17 0516   Coping/Psychosocial Response Interventions   Plan Of Care Reviewed With patient;spouse --    Patient Care Overview   Progress no change --    Outcome Evaluation   Outcome Summary/Follow up Plan --  VSS. Plan for cscope and heart cath today. Started bowel prep. Will continue to monitor.        Goal: Adult Individualization and Mutuality  Outcome: Ongoing (interventions implemented as appropriate)  Goal: Discharge Needs Assessment  Outcome: Ongoing (interventions implemented as appropriate)    Problem: Gastrointestinal Bleeding (Adult)  Goal: Signs and Symptoms of Listed Potential Problems Will be Absent or Manageable (Gastrointestinal Bleeding)  Outcome: Ongoing (interventions implemented as appropriate)    Problem: Pain, Acute (Adult)  Goal: Acceptable Pain Control/Comfort Level  Outcome: Ongoing (interventions implemented as appropriate)

## 2017-02-10 NOTE — PROGRESS NOTES
[facility]  Inpatient Progress Note    Reason for Follow Up:  Rectal bleeding    Subjective     Interval History:   Pt brought to endoscopy for colonoscopy for evaluation of rectal bleeding.  Anesthesia reviewed his chart and found that there were plans for cardiac catheterization following the colonoscopy.  Concerns were also mentioned for his symptoms precipitating the cardiac catheterization.  A decision was made that sedation for the colonoscopy would not be done until after the cardiac catheterization.  Discussed this with the patient and he verbalized understanding of the concern of increased risk with sedation.  Patient subsequently went for cardiac catheterization.    Current Facility-Administered Medications:   •  aspirin chewable tablet 81 mg, 81 mg, Oral, Daily, Hina Charles MD, 81 mg at 02/09/17 1500  •  atorvastatin (LIPITOR) tablet 10 mg, 10 mg, Oral, Nightly, Hina Charels MD, 10 mg at 02/09/17 2059  •  folic acid (FOLVITE) tablet 800 mcg, 800 mcg, Oral, Daily, Hina Charles MD, 800 mcg at 02/09/17 1501  •  isosorbide mononitrate (IMDUR) 24 hr tablet 30 mg, 30 mg, Oral, Daily, Hina Charles MD, 30 mg at 02/09/17 1501  •  lactated ringers infusion, 30 mL/hr, Intravenous, Continuous PRN, Concepción Taylor MD, Last Rate: 30 mL/hr at 02/10/17 1250, 30 mL/hr at 02/10/17 1250  •  lisinopril (PRINIVIL,ZESTRIL) tablet 10 mg, 10 mg, Oral, Daily, Hina Charles MD, 10 mg at 02/09/17 2059  •  metoprolol succinate XL (TOPROL-XL) 24 hr tablet 50 mg, 50 mg, Oral, Daily, Hina Charles MD, 50 mg at 02/09/17 1501  •  multivitamin (THERAGRAN) tablet 1 tablet, 1 tablet, Oral, Daily, Hina Charles MD, 1 tablet at 02/09/17 1501  •  sodium chloride 0.9 % flush 1-10 mL, 1-10 mL, Intravenous, PRN, Hina Charles MD  •  sodium chloride 0.9 % flush 1-10 mL, 1-10 mL, Intravenous, PRN, DIMAS Ward  Review of Systems:    The following systems were reviewed and negative;  constitution, ENT, respiratory,  cardiovascular and gastrointestinal    Objective     Vital Signs  Temp:  [97.5 °F (36.4 °C)-98.1 °F (36.7 °C)] 98.1 °F (36.7 °C)  Heart Rate:  [52-67] 67  Resp:  [16-18] 18  BP: (103-123)/(77-88) 103/77  Body mass index is 30.28 kg/(m^2).  No intake or output data in the 24 hours ending 02/10/17 1428          Physical Exam:   General: patient awake, alert and cooperative   Eyes: Normal lids and lashes, no scleral icterus   Neck: supple, normal ROM   Skin: warm and dry, not jaundiced   Cardiovascular: regular rhythm and rate, no murmurs auscultated   Pulm: clear to auscultation bilaterally, regular and unlabored   Abdomen: soft, nontender, nondistended; normal bowel sounds   Rectal: deferred   Extremities: no rash or edema   Psychiatric: Normal mood and behavior; memory intact     Results Review:     I reviewed the patient's new clinical results.      Results from last 7 days  Lab Units 02/10/17  0429 02/09/17  1305   WBC 10*3/mm3 7.56 9.88   HEMOGLOBIN g/dL 15.9 16.4   HEMATOCRIT % 46.4 47.3   PLATELETS 10*3/mm3 183 225         Results from last 7 days  Lab Units 02/10/17  0429 02/09/17  1400   SODIUM mmol/L 139 139   POTASSIUM mmol/L 4.3 4.3   CHLORIDE mmol/L 101 101   TOTAL CO2 mmol/L 27.1 28.2   BUN mg/dL 8 8   CREATININE mg/dL 0.97 0.91   CALCIUM mg/dL 9.3 9.4   BILIRUBIN mg/dL 1.7* 1.4*   ALK PHOS U/L 50 52   ALT (SGPT) U/L 18 20   AST (SGOT) U/L 19 13   GLUCOSE mg/dL 95 89         Results from last 7 days  Lab Units 02/10/17  0429   INR  1.02       No results found for: LIPASE    Radiology:    Imaging Results (last 24 hours)     Procedure Component Value Units Date/Time    CT Abdomen Pelvis With Contrast [[id number]] Collected:  02/09/17 2039     Updated:  02/09/17 2123    Narrative:       ABDOMEN AND PELVIS CT WITH CONTRAST     HISTORY: Lower abdominal pain and GI bleed.     TECHNIQUE: CT abdomen and pelvis was performed using IV contrast.     FINDINGS: The visualized lung bases are clear. Parenchyma of the  liver  appears normal. The gallbladder is in situ and has a normal CT  appearance. The pancreas, adrenals, and spleen appear normal.     Parenchyma of the right kidney appears normal. Along the medial upper  pole of the left kidney there is a 16 mm partly exophytic lesion which  measures 63 Hounsfield units. This is either a hemorrhagic/proteinaceous  cyst or an enhancing lesion. Follow-up with renal protocol CT is  suggested. The left kidney otherwise appears normal. There is no  hydronephrosis or hydroureter.     The appendix is in situ and has a normal CT appearance. There is  diverticulosis along the sigmoid colon without compelling findings of  diverticulitis or colitis. The small bowel appears normal as does the  stomach.     There is atheromatous vascular calcification. The aorta is normal in  caliber. There is no lymphadenopathy. The urinary bladder appears  normal. The bones and body wall appear normal.       Impression:       Colonic diverticulosis. No acute abnormality is identified.     There is a 16 mm hyperdense lesion at the medial upper pole of the left  kidney. CT follow-up is suggested.     This report was finalized on 2/9/2017 9:20 PM by Dr. Figueroa Morales MD.               Assessment/Plan     Patient Active Problem List   Diagnosis Code   • CAD (coronary artery disease) I25.10   • Hypertension I10   • Chest pain R07.9   • Bloody stool K92.1       1) maroon blood coating stool: Suggestive of anal outlet issue including hemorrhoids versus fissure versus ulcer. Has occurred for the past 3 days.  Hemoglobin remains stable     2) intermittent lower abdominal pain: CT exam with concerning left kidney lesion     3) unstable angina post CABG: cardiac cath today     4) history of colon polyps: Colonoscopy 2-3 years ago     Plan:  -Proceed with cardiac catheterization.  I have discussed with anesthesia and they are unwilling to sedate for the colonoscopy following the catheterization as well  -We will  discuss timing of colonoscopy following cardiac catheterization; this depends on the results and possibilities of antiplatelet therapy  -Assuredly his hemoglobin remains stable so urgent colonoscopy is not warranted at this time  -Further evaluation of his left kidney lesion    Concepción Taylor MD  02/10/17  2:28 PM

## 2017-02-11 VITALS
RESPIRATION RATE: 18 BRPM | SYSTOLIC BLOOD PRESSURE: 120 MMHG | DIASTOLIC BLOOD PRESSURE: 87 MMHG | OXYGEN SATURATION: 95 % | HEART RATE: 70 BPM | TEMPERATURE: 97.8 F | HEIGHT: 70 IN | WEIGHT: 211 LBS | BODY MASS INDEX: 30.21 KG/M2

## 2017-02-11 LAB
ANION GAP SERPL CALCULATED.3IONS-SCNC: 14 MMOL/L
BUN BLD-MCNC: 16 MG/DL (ref 6–20)
BUN/CREAT SERPL: 18.4 (ref 7–25)
CALCIUM SPEC-SCNC: 8.9 MG/DL (ref 8.6–10.5)
CHLORIDE SERPL-SCNC: 103 MMOL/L (ref 98–107)
CO2 SERPL-SCNC: 23 MMOL/L (ref 22–29)
CREAT BLD-MCNC: 0.87 MG/DL (ref 0.76–1.27)
DEPRECATED RDW RBC AUTO: 41.5 FL (ref 37–54)
ERYTHROCYTE [DISTWIDTH] IN BLOOD BY AUTOMATED COUNT: 12.9 % (ref 11.5–14.5)
GFR SERPL CREATININE-BSD FRML MDRD: 91 ML/MIN/1.73
GLUCOSE BLD-MCNC: 96 MG/DL (ref 65–99)
HCT VFR BLD AUTO: 43.7 % (ref 40.4–52.2)
HGB BLD-MCNC: 15.3 G/DL (ref 13.7–17.6)
MCH RBC QN AUTO: 31.1 PG (ref 27–32.7)
MCHC RBC AUTO-ENTMCNC: 35 G/DL (ref 32.6–36.4)
MCV RBC AUTO: 88.8 FL (ref 79.8–96.2)
PLATELET # BLD AUTO: 192 10*3/MM3 (ref 140–500)
PMV BLD AUTO: 10 FL (ref 6–12)
POTASSIUM BLD-SCNC: 4.1 MMOL/L (ref 3.5–5.2)
RBC # BLD AUTO: 4.92 10*6/MM3 (ref 4.6–6)
SODIUM BLD-SCNC: 140 MMOL/L (ref 136–145)
WBC NRBC COR # BLD: 7.17 10*3/MM3 (ref 4.5–10.7)

## 2017-02-11 PROCEDURE — 80048 BASIC METABOLIC PNL TOTAL CA: CPT | Performed by: INTERNAL MEDICINE

## 2017-02-11 PROCEDURE — 93010 ELECTROCARDIOGRAM REPORT: CPT | Performed by: INTERNAL MEDICINE

## 2017-02-11 PROCEDURE — 99217 PR OBSERVATION CARE DISCHARGE MANAGEMENT: CPT | Performed by: INTERNAL MEDICINE

## 2017-02-11 PROCEDURE — 93005 ELECTROCARDIOGRAM TRACING: CPT | Performed by: INTERNAL MEDICINE

## 2017-02-11 PROCEDURE — 99224 PR SBSQ OBSERVATION CARE/DAY 15 MINUTES: CPT | Performed by: INTERNAL MEDICINE

## 2017-02-11 PROCEDURE — 85027 COMPLETE CBC AUTOMATED: CPT | Performed by: INTERNAL MEDICINE

## 2017-02-11 PROCEDURE — G0378 HOSPITAL OBSERVATION PER HR: HCPCS

## 2017-02-11 RX ORDER — NITROGLYCERIN 0.4 MG/1
0.4 TABLET SUBLINGUAL AS NEEDED
COMMUNITY
Start: 2017-01-23 | End: 2017-04-18

## 2017-02-11 RX ADMIN — LISINOPRIL 10 MG: 10 TABLET ORAL at 09:31

## 2017-02-11 NOTE — DISCHARGE SUMMARY
Date of Discharge:  2/11/2017  Date of Admit: 2/9/2017    Discharge Diagnosis:  1.  Chest pain.  2.  Bloody stool  3.  Coronary artery disease  4.  Hypertension  5.  Renal mass  6.  Recent colonoscopy polypectomy  7.  Recent abdominal hernia surgery      Hospital Course:   Patient was admitted from our office with progressive chest pain and complaints of melanotic stool.  He ruled out for myocardial infarction.  He was seen by gastroenterology.  A CT scan of his abdomen was obtained that revealed no acute abdominal process but did reveal an indeterminate exophytic renal mass.  Scheduled to have a colonoscopy as well as cardiac catheterization.  Anesthesia however did not wish to pursue colonoscopy until after the cardiac catheterization.  Cardiac catheterization revealed occluded LIMA graft LAD with only mild to moderate disease of the native LAD.  A graft to the obtuse marginal branch was patent.  The vein graft to the right coronary artery was occluded with moderate native vessel disease that had artery been recanalized.  Therefore medical therapy was pursued.  I have also asked him to check his blood pressure with recurrent chest pain. He is to have further outpatient evaluation of the renal mass and bloody stools.  Hemoglobin was stable.  At the the time of this dictation his stable      Consults     Date and Time Order Name Status Description    2/10/2017 1110 Inpatient Consult to Urology Completed     2/9/2017 1256 Inpatient Consult to Gastroenterology Completed           Discharge Medications   Kev Mae   Home Medication Instructions POOL:[id number]    Printed on:02/11/17 1434   Medication Information                      aspirin 81 MG tablet  Take 1 tablet by mouth Daily.             folic acid (FOLVITE) 800 MCG tablet  Take 1 tablet by mouth Daily. As directed             isosorbide mononitrate (IMDUR) 30 MG 24 hr tablet  Take 1 tablet by mouth Daily.             lisinopril (PRINIVIL,ZESTRIL) 10  MG tablet  Take 1 tablet by mouth Daily.             metoprolol succinate XL (TOPROL XL) 50 MG 24 hr tablet  Take 1 tablet by mouth Daily.             MULTIPLE VITAMIN PO  Take  by mouth Daily.             nitroglycerin (NITROSTAT) 0.4 MG SL tablet  Place 0.4 tablets under the tongue As Needed.             simvastatin (ZOCOR) 20 MG tablet  Take 1 tablet by mouth Daily.                 Discharge Diet:  American Heart Association diet    Activity at Discharge:  tablet    Discharge disposition: home    Condition on Discharge: stable    Follow-up Appointments  Patient to call to arrange follow-up with Dr. Grier  Patient to call to arrange follow-up with Dr. Samaria webb in 3 weeks for further GI evaluation  Patient to call to arrange follow-up with me in 4 weeks    Hina Charles MD  02/11/17  2:34 PM    30min spent in reviewing records, discussion and examination of the patient and discussion with other members of the patient's medical team.

## 2017-02-11 NOTE — PROGRESS NOTES
[facility]  Inpatient Progress Note    Reason for Follow Up:  Rectal bleeding    Subjective     Interval History:   No bleeding overnight.  No nausea, vomiting or abdominal pain.    Current Facility-Administered Medications:   •  aspirin chewable tablet 81 mg, 81 mg, Oral, Daily, Hina Charles MD, 81 mg at 02/10/17 2040  •  atorvastatin (LIPITOR) tablet 10 mg, 10 mg, Oral, Nightly, Hina Charles MD, 10 mg at 02/10/17 2041  •  folic acid (FOLVITE) tablet 800 mcg, 800 mcg, Oral, Daily, Hina Charles MD, 800 mcg at 02/10/17 2041  •  isosorbide mononitrate (IMDUR) 24 hr tablet 30 mg, 30 mg, Oral, Daily, Hina Charles MD, 30 mg at 02/10/17 2041  •  lisinopril (PRINIVIL,ZESTRIL) tablet 10 mg, 10 mg, Oral, Daily, Hina Charles MD, 10 mg at 02/11/17 0931  •  metoprolol succinate XL (TOPROL-XL) 24 hr tablet 50 mg, 50 mg, Oral, Daily, Hina Charles MD, 50 mg at 02/09/17 1501  •  multivitamin (THERAGRAN) tablet 1 tablet, 1 tablet, Oral, Daily, Hina Charles MD, 1 tablet at 02/10/17 2040  •  sodium chloride 0.9 % flush 1-10 mL, 1-10 mL, Intravenous, PRN, Hina Charles MD  •  sodium chloride 0.9 % flush 1-10 mL, 1-10 mL, Intravenous, PRN, Leland Carpio MD  Review of Systems:    10 point review of systems is otherwise negative, pertinent positives are found in the history of present illness or subjective portion of this dictation    Objective     Vital Signs  Temp:  [97.6 °F (36.4 °C)-98 °F (36.7 °C)] 97.6 °F (36.4 °C)  Heart Rate:  [60-81] 69  Resp:  [14-20] 14  BP: (100-173)/(49-89) 132/86  Body mass index is 30.28 kg/(m^2).    Intake/Output Summary (Last 24 hours) at 02/11/17 1335  Last data filed at 02/10/17 1600   Gross per 24 hour   Intake    200 ml   Output      0 ml   Net    200 ml             Physical Exam:   General: patient awake, alert and cooperative   Eyes: Normal lids and lashes, no scleral icterus   Neck: supple, normal ROM   Skin: warm and dry, not jaundiced   Cardiovascular: regular rhythm and rate, no  murmurs auscultated   Pulm: clear to auscultation bilaterally, regular and unlabored   Abdomen: soft, nontender, nondistended; normal bowel sounds   Rectal: deferred   Extremities: no rash or edema   Psychiatric: Normal mood and behavior; memory intact     Results Review:     I reviewed the patient's new clinical results.      Results from last 7 days  Lab Units 02/11/17  0422 02/10/17  0429 02/09/17  1305   WBC 10*3/mm3 7.17 7.56 9.88   HEMOGLOBIN g/dL 15.3 15.9 16.4   HEMATOCRIT % 43.7 46.4 47.3  46.5   PLATELETS 10*3/mm3 192 183 225         Results from last 7 days  Lab Units 02/11/17  0422 02/10/17  0429 02/09/17  1400   SODIUM mmol/L 140 139 139   POTASSIUM mmol/L 4.1 4.3 4.3   CHLORIDE mmol/L 103 101 101   TOTAL CO2 mmol/L 23.0 27.1 28.2   BUN mg/dL 16 8 8   CREATININE mg/dL 0.87 0.97 0.91   CALCIUM mg/dL 8.9 9.3 9.4   BILIRUBIN mg/dL  --  1.7* 1.4*   ALK PHOS U/L  --  50 52   ALT (SGPT) U/L  --  18 20   AST (SGOT) U/L  --  19 13   GLUCOSE mg/dL 96 95 89         Results from last 7 days  Lab Units 02/10/17  0429   INR  1.02       No results found for: LIPASE    Radiology:    Imaging Results (last 24 hours)     ** No results found for the last 24 hours. **            Assessment/Plan     Patient Active Problem List   Diagnosis Code   • CAD (coronary artery disease) I25.10   • Hypertension I10   • Chest pain R07.9   • Bloody stool K92.1       Rectal bleeding    Plan:    Discharge home  Plan cardiac catheterization as an outpatient  Plan for workup of renal masses and outpatient  Plan for colonoscopy as an outpatient  Follow-up with GI in 3 weeks      Александр Salcedo MD  02/11/17  1:35 PM

## 2017-02-11 NOTE — PROGRESS NOTES
[facility]  Progress note: 2017    Patient Identification:  Name:Kev Mae  Age:56 y.o.  Sex: male  :  1960  MRN: [id number]           CC:  Follow-up of chest pain, coronary artery disease    Interval history:   Current chest pain.  Noted plans for outpatient evaluation of renal mass and colonoscopy     Vital Signs:   Temp:  [97.6 °F (36.4 °C)-98 °F (36.7 °C)] 97.8 °F (36.6 °C)  Heart Rate:  [60-81] 70  Resp:  [14-20] 18  BP: (100-173)/(49-89) 120/87    Intake/Output Summary (Last 24 hours) at 17 1417  Last data filed at 02/10/17 1600   Gross per 24 hour   Intake    200 ml   Output      0 ml   Net    200 ml       Physical Examination:    General Appearance No acute distress   Neck No adenopathy, supple, trachea midline, no thyromegaly, no carotid bruit, no JVD   Lungs Clear to auscultation,respirations regular, even and unlabored   Heart Regular rhythm and normal rate, normal S1 and S2, no murmur, no gallop, no rub, no click   Chest wall No abnormalities observed   Abdomen Normal bowel sounds, no masses, no hepatomegaly, soft   Extremities Moves all extremities well, no edema, no cyanosis, no redness   Neurological Alert and oriented x 3     Lab Review:  Personally reviewed the labs, radiology imaging and other cardiac procedures.   Results from last 7 days  Lab Units 02/11/17  0422 02/10/17  0429   SODIUM mmol/L 140 139   POTASSIUM mmol/L 4.1 4.3   CHLORIDE mmol/L 103 101   TOTAL CO2 mmol/L 23.0 27.1   BUN mg/dL 16 8   CREATININE mg/dL 0.87 0.97   CALCIUM mg/dL 8.9 9.3   BILIRUBIN mg/dL  --  1.7*   ALK PHOS U/L  --  50   ALT (SGPT) U/L  --  18   AST (SGOT) U/L  --  19   GLUCOSE mg/dL 96 95       Results from last 7 days  Lab Units 17  1400   TROPONIN T ng/mL <0.010     )  Results from last 7 days  Lab Units 17  0422 02/10/17  0429 17  1305   WBC 10*3/mm3 7.17 7.56 9.88   HEMOGLOBIN g/dL 15.3 15.9 16.4   HEMATOCRIT % 43.7 46.4 47.3  46.5   PLATELETS 10*3/mm3 192  183 225       Results from last 7 days  Lab Units 02/10/17  0429   INR  1.02       Medication Review:   Meds reviewed  Scheduled Meds:  aspirin 81 mg Oral Daily   atorvastatin 10 mg Oral Nightly   folic acid 800 mcg Oral Daily   isosorbide mononitrate 30 mg Oral Daily   lisinopril 10 mg Oral Daily   metoprolol succinate XL 50 mg Oral Daily   multivitamin 1 tablet Oral Daily     I personally viewed and interpreted the patient's EKG/Telemetry data: Sinus rhythm    Assessment and Plan  1.  Chest pain.  Cardiac catheterization yesterday revealed limited to graft to the LAD to be occluded but with only mild to moderate disease of the native LAD system.  The vein graft to obtuse marginal branch is patent and the vein graft to the right coronary artery was occluded.  The right coronary artery was previously occluded and is been recanalized.  It actually appeared better than the prior study.  His ejection fraction was normal with posterior basal hypokinesis.  He will continue with his current medical regimen.  If he has recurrent chest pain will increase Imdur further.  I've also asked him to check his blood pressure with these events is both of them occurred under stress  2.  Coronary artery disease as above  3.  Hypertension, as above  4.  Renal mass, as above further outpatient testing pending  5.  Rectal bleeding.  None in hospital.  Plans for further outpatient evaluation.  6. Nicotine smoke exposure. I recommended abstinence.    Mini Charles  2/11/20172:17 PM  30min spent in reviewing records, discussion and examination of the patient and discussion with other members of the patient's medical team.         EMR Dragon/Transcription disclaimer:   Much of this encounter note is an electronic transcription/translation of spoken language to printed text. The electronic translation of spoken language may permit erroneous, or at times, nonsensical words or phrases to be inadvertently transcribed; Although I have reviewed the  note for such errors, some may still exist.

## 2017-02-11 NOTE — PLAN OF CARE
Problem: Patient Care Overview (Adult)  Goal: Plan of Care Review  Outcome: Ongoing (interventions implemented as appropriate)    02/10/17 1653 02/11/17 0307   Coping/Psychosocial Response Interventions   Plan Of Care Reviewed With patient;spouse --    Patient Care Overview   Progress improving --    Outcome Evaluation   Outcome Summary/Follow up Plan --  VSS. No complaints of pain. Will continue to monitor.        Goal: Adult Individualization and Mutuality  Outcome: Ongoing (interventions implemented as appropriate)  Goal: Discharge Needs Assessment  Outcome: Ongoing (interventions implemented as appropriate)    Problem: Gastrointestinal Bleeding (Adult)  Goal: Signs and Symptoms of Listed Potential Problems Will be Absent or Manageable (Gastrointestinal Bleeding)  Outcome: Ongoing (interventions implemented as appropriate)    Problem: Pain, Acute (Adult)  Goal: Acceptable Pain Control/Comfort Level  Outcome: Ongoing (interventions implemented as appropriate)

## 2017-02-11 NOTE — CONSULTS
Has indeterminate renal mass ok to dc and will arrange op followup to discuss further eval and mgmt.thanks

## 2017-02-11 NOTE — PLAN OF CARE
Problem: Patient Care Overview (Adult)  Goal: Plan of Care Review  Outcome: Outcome(s) achieved Date Met:  02/11/17 02/11/17 2996   Coping/Psychosocial Response Interventions   Plan Of Care Reviewed With patient   Patient Care Overview   Progress improving   Outcome Evaluation   Outcome Summary/Follow up Plan VSS. No pain. Being discharged.        Goal: Adult Individualization and Mutuality  Outcome: Outcome(s) achieved Date Met:  02/11/17  Goal: Discharge Needs Assessment  Outcome: Outcome(s) achieved Date Met:  02/11/17    Problem: Gastrointestinal Bleeding (Adult)  Goal: Signs and Symptoms of Listed Potential Problems Will be Absent or Manageable (Gastrointestinal Bleeding)  Outcome: Outcome(s) achieved Date Met:  02/11/17    Problem: Pain, Acute (Adult)  Goal: Acceptable Pain Control/Comfort Level  Outcome: Outcome(s) achieved Date Met:  02/11/17    Problem: GI Endoscopy (Adult)  Goal: Signs and Symptoms of Listed Potential Problems Will be Absent or Manageable (GI Endoscopy)  Outcome: Outcome(s) achieved Date Met:  02/11/17    Problem: Cardiac Catheterization with/without PCI (Adult)  Goal: Signs and Symptoms of Listed Potential Problems Will be Absent or Manageable (Cardiac Catheterization with/without PCI)  Outcome: Outcome(s) achieved Date Met:  02/11/17

## 2017-02-12 NOTE — H&P
UROLOGY CONSULTATION     REQUESTING PHYSICIAN: Hina Charles MD     CONSULTING PHYSICIAN: Dima Thompson MD     REASON FOR CONSULTATION: Indeterminate renal lesion.     HISTORY OF PRESENT ILLNESS: This very pleasant white male was admitted to the hospital for further cardiac evaluation. He was also noted to have blood in his stool and has been seen by GI. As part of his evaluation of his GI bleed, the patient was found to have a 16 mm hyperdense lesion in the medial pole of the left kidney. This was a contrasted study. We were asked to see and evaluate the patient for this condition. The patient denies any voiding symptoms. He denies any past genitourinary stones, surgeries or infections.     PAST MEDICAL HISTORY:  1. Coronary artery disease.   2. Coronary arterial plaque.   3. Hyperlipidemia.   4. Hypertension.   5. Previous myocardial infarction.     PAST SURGICAL HISTORY:   1. Coronary artery bypass grafting x3.   2. Repair of hernia.   3. Rotator cuff surgery.   4. Finger surgery.     PHYSICAL EXAMINATION:  GENERAL: He is a well-nourished, well-developed white male, in no apparent distress, alert and oriented x3.   COR: Regular rate and rhythm.   LUNGS: Clear.   ABDOMEN/FLANKS: Benign.     IMPRESSION: Indeterminate renal mass.     PLAN: I discussed the implications of this lesion with the patient and his family. I have recommended further evaluation and discussion of further management as an outpatient. I will follow the patient up until he is discharged.     Thank you very much for asking us to help with his care.       Dima Thompson M.D.  RML:emelia  D:   02/11/2017 12:39:01  T:   02/11/2017 13:54:27  Job ID:   [id number]  Document ID:   [id number]  cc:   Hina Charles M.D.

## 2017-02-13 LAB — ACT BLD: 178 SECONDS (ref 82–152)

## 2017-02-15 DIAGNOSIS — K62.5 RECTAL BLEEDING: Primary | ICD-10-CM

## 2017-02-15 DIAGNOSIS — Z86.010 PERSONAL HISTORY OF COLONIC POLYPS: ICD-10-CM

## 2017-02-15 RX ORDER — SODIUM CHLORIDE 0.9 % (FLUSH) 0.9 %
1-10 SYRINGE (ML) INJECTION AS NEEDED
Status: CANCELLED | OUTPATIENT
Start: 2017-02-15

## 2017-02-15 RX ORDER — SODIUM CHLORIDE, SODIUM LACTATE, POTASSIUM CHLORIDE, CALCIUM CHLORIDE 600; 310; 30; 20 MG/100ML; MG/100ML; MG/100ML; MG/100ML
30 INJECTION, SOLUTION INTRAVENOUS CONTINUOUS
Status: CANCELLED | OUTPATIENT
Start: 2017-02-15

## 2017-03-23 ENCOUNTER — TELEPHONE (OUTPATIENT)
Dept: CARDIOLOGY | Facility: CLINIC | Age: 57
End: 2017-03-23

## 2017-03-23 NOTE — TELEPHONE ENCOUNTER
Please verify with pt her is not ahaving any more CP and what is his BP. As long as he is not, he is cleared. tien

## 2017-03-23 NOTE — TELEPHONE ENCOUNTER
Bryanna from Dr. Thompson's office called, informing me that patient is scheduled for a laproscopic nephrectomy on May 2.  Patient told Bryanna that you cleared him in the hospital and Bryanna would like to get a clearance note faxed to her at [phone].  I informed Bryanna that I did not see that and I would have to ask you.  Is patient cleared for surgery, or do you need to see him in the office?  Please advise.  Kelsey

## 2017-04-03 ENCOUNTER — HOSPITAL ENCOUNTER (OUTPATIENT)
Facility: HOSPITAL | Age: 57
Setting detail: HOSPITAL OUTPATIENT SURGERY
Discharge: HOME | End: 2017-04-03
Attending: INTERNAL MEDICINE | Admitting: INTERNAL MEDICINE

## 2017-04-03 ENCOUNTER — ANESTHESIA (OUTPATIENT)
Dept: GASTROENTEROLOGY | Facility: HOSPITAL | Age: 57
End: 2017-04-03

## 2017-04-03 ENCOUNTER — ANESTHESIA EVENT (OUTPATIENT)
Dept: GASTROENTEROLOGY | Facility: HOSPITAL | Age: 57
End: 2017-04-03

## 2017-04-03 VITALS
BODY MASS INDEX: 28.97 KG/M2 | RESPIRATION RATE: 16 BRPM | WEIGHT: 202.38 LBS | OXYGEN SATURATION: 97 % | SYSTOLIC BLOOD PRESSURE: 107 MMHG | TEMPERATURE: 97.8 F | HEIGHT: 70 IN | HEART RATE: 47 BPM | DIASTOLIC BLOOD PRESSURE: 63 MMHG

## 2017-04-03 DIAGNOSIS — Z86.010 PERSONAL HISTORY OF COLONIC POLYPS: ICD-10-CM

## 2017-04-03 DIAGNOSIS — K62.5 RECTAL BLEEDING: ICD-10-CM

## 2017-04-03 PROCEDURE — 45380 COLONOSCOPY AND BIOPSY: CPT | Performed by: INTERNAL MEDICINE

## 2017-04-03 PROCEDURE — 25010000002 PROPOFOL 10 MG/ML EMULSION: Performed by: ANESTHESIOLOGY

## 2017-04-03 PROCEDURE — 45385 COLONOSCOPY W/LESION REMOVAL: CPT | Performed by: INTERNAL MEDICINE

## 2017-04-03 PROCEDURE — 88305 TISSUE EXAM BY PATHOLOGIST: CPT | Performed by: INTERNAL MEDICINE

## 2017-04-03 RX ORDER — SODIUM CHLORIDE, SODIUM LACTATE, POTASSIUM CHLORIDE, CALCIUM CHLORIDE 600; 310; 30; 20 MG/100ML; MG/100ML; MG/100ML; MG/100ML
30 INJECTION, SOLUTION INTRAVENOUS CONTINUOUS
Status: DISCONTINUED | OUTPATIENT
Start: 2017-04-03 | End: 2017-04-04 | Stop reason: HOSPADM

## 2017-04-03 RX ORDER — PROPOFOL 10 MG/ML
VIAL (ML) INTRAVENOUS CONTINUOUS PRN
Status: DISCONTINUED | OUTPATIENT
Start: 2017-04-03 | End: 2017-04-03 | Stop reason: SURG

## 2017-04-03 RX ORDER — PROMETHAZINE HYDROCHLORIDE 25 MG/ML
6.25 INJECTION, SOLUTION INTRAMUSCULAR; INTRAVENOUS ONCE AS NEEDED
Status: DISCONTINUED | OUTPATIENT
Start: 2017-04-03 | End: 2017-04-04 | Stop reason: HOSPADM

## 2017-04-03 RX ORDER — PROMETHAZINE HYDROCHLORIDE 25 MG/1
25 SUPPOSITORY RECTAL ONCE AS NEEDED
Status: DISCONTINUED | OUTPATIENT
Start: 2017-04-03 | End: 2017-04-04 | Stop reason: HOSPADM

## 2017-04-03 RX ORDER — LIDOCAINE HYDROCHLORIDE 20 MG/ML
INJECTION, SOLUTION INFILTRATION; PERINEURAL AS NEEDED
Status: DISCONTINUED | OUTPATIENT
Start: 2017-04-03 | End: 2017-04-03 | Stop reason: SURG

## 2017-04-03 RX ORDER — SODIUM CHLORIDE 0.9 % (FLUSH) 0.9 %
1-10 SYRINGE (ML) INJECTION AS NEEDED
Status: DISCONTINUED | OUTPATIENT
Start: 2017-04-03 | End: 2017-04-04 | Stop reason: HOSPADM

## 2017-04-03 RX ORDER — ONDANSETRON 2 MG/ML
4 INJECTION INTRAMUSCULAR; INTRAVENOUS ONCE AS NEEDED
Status: DISCONTINUED | OUTPATIENT
Start: 2017-04-03 | End: 2017-04-04 | Stop reason: HOSPADM

## 2017-04-03 RX ORDER — PROMETHAZINE HYDROCHLORIDE 25 MG/1
25 TABLET ORAL ONCE AS NEEDED
Status: DISCONTINUED | OUTPATIENT
Start: 2017-04-03 | End: 2017-04-04 | Stop reason: HOSPADM

## 2017-04-03 RX ORDER — PROPOFOL 10 MG/ML
VIAL (ML) INTRAVENOUS AS NEEDED
Status: DISCONTINUED | OUTPATIENT
Start: 2017-04-03 | End: 2017-04-03 | Stop reason: SURG

## 2017-04-03 RX ADMIN — PROPOFOL 150 MG: 10 INJECTION, EMULSION INTRAVENOUS at 17:34

## 2017-04-03 RX ADMIN — LIDOCAINE HYDROCHLORIDE 60 MG: 20 INJECTION, SOLUTION INFILTRATION; PERINEURAL at 17:34

## 2017-04-03 RX ADMIN — SODIUM CHLORIDE, POTASSIUM CHLORIDE, SODIUM LACTATE AND CALCIUM CHLORIDE 30 ML/HR: 600; 310; 30; 20 INJECTION, SOLUTION INTRAVENOUS at 14:45

## 2017-04-03 RX ADMIN — SODIUM CHLORIDE, POTASSIUM CHLORIDE, SODIUM LACTATE AND CALCIUM CHLORIDE: 600; 310; 30; 20 INJECTION, SOLUTION INTRAVENOUS at 17:34

## 2017-04-03 RX ADMIN — PROPOFOL 150 MCG/KG/MIN: 10 INJECTION, EMULSION INTRAVENOUS at 17:34

## 2017-04-03 NOTE — PLAN OF CARE
Problem: Patient Care Overview (Adult)  Goal: Plan of Care Review  Outcome: Ongoing (interventions implemented as appropriate)    04/03/17 1423   Coping/Psychosocial Response Interventions   Plan Of Care Reviewed With patient   Patient Care Overview   Progress progress toward functional goals as expected       Goal: Adult Individualization and Mutuality  Outcome: Ongoing (interventions implemented as appropriate)    04/03/17 1423   Individualization   Patient Specific Preferences none identified       Goal: Discharge Needs Assessment  Outcome: Ongoing (interventions implemented as appropriate)    04/03/17 1423   Discharge Needs Assessment   Concerns To Be Addressed no discharge needs identified   Discharge Disposition home or self-care   Living Environment   Transportation Available car;family or friend will provide         Problem: GI Endoscopy (Adult)  Goal: Signs and Symptoms of Listed Potential Problems Will be Absent or Manageable (GI Endoscopy)  Outcome: Ongoing (interventions implemented as appropriate)    04/03/17 1423   GI Endoscopy   Problems Assessed (GI Endoscopy) all

## 2017-04-03 NOTE — H&P
[facility]  Pre Procedure History & Physical    Chief Complaint:   Rectal bleeding, history of polyps    Subjective     HPI:   56-year-old male with a history of coronary artery disease, hernia repair 12/29/16, and a history of colon polyps admitted for seeing dark blood in his stool. He was seen in the office visit today by Dr. Charles for recurrent chest pain with plans for a cardiac catheterization. He reported maroon, dark red blood per rectum ×3 days and lower abdominal pain intermittently for 2 to 3 months but increasing in frequency. He states for the past 3 mornings he has had dark, red blood coating his stool. His abdominal pain is described as a dull ache with no specific triggering or alleviating factors. His current hemoglobin is 16.4 with a hematocrit of 47.3, and a normal WBC. He takes low-dose aspirin daily but denies NSAID use or alcohol use. He denies dysphagia, odynophagia, heartburn, reflux, weight loss, fever, chills, black tarry stool, rectal pain, or weight loss. He denies a family history of colon cancer. His last colonoscopy was 2-3 years ago at [facility] which he states was normal except for 2-3 polyps. His current hemoglobin is 16.4 with a hematocrit of 47.3, and a normal WBC.     Past Medical History:   Past Medical History:   Diagnosis Date   • CAD (coronary artery disease)    • Carotid artery plaque    • Chest pain    • Diaphoresis    • Diplopia    • Hyperlipidemia    • Hypertension    • Nausea    • Past myocardial infarction    • SOB (shortness of breath)        Family History:  Family History   Problem Relation Age of Onset   • Heart attack Mother    • Heart attack Brother    • Deep vein thrombosis Cousin      with embolic death       Social History:   reports that he quit smoking about 8 years ago. He does not have any smokeless tobacco history on file. He reports that he drinks alcohol.    Medications:   Prescriptions Prior to Admission  "  Medication Sig Dispense Refill Last Dose   • folic acid (FOLVITE) 800 MCG tablet Take 1 tablet by mouth Daily. As directed   4/2/2017 at Unknown time   • isosorbide mononitrate (IMDUR) 30 MG 24 hr tablet Take 1 tablet by mouth Daily.   4/2/2017 at Unknown time   • lisinopril (PRINIVIL,ZESTRIL) 10 MG tablet Take 1 tablet by mouth Daily.   4/2/2017 at Unknown time   • metoprolol succinate XL (TOPROL XL) 50 MG 24 hr tablet Take 1 tablet by mouth Daily.   4/2/2017 at Unknown time   • MULTIPLE VITAMIN PO Take  by mouth Daily.   4/2/2017 at Unknown time   • nitroglycerin (NITROSTAT) 0.4 MG SL tablet Place 0.4 tablets under the tongue As Needed.   4/2/2017 at Unknown time   • simvastatin (ZOCOR) 20 MG tablet Take 1 tablet by mouth Daily.   4/2/2017 at Unknown time   • aspirin 81 MG tablet Take 1 tablet by mouth Daily.   4/2/2017       Allergies:  Review of patient's allergies indicates no known allergies.    ROS:    The following systems were reviewed and negative;  constitution, respiratory, cardiovascular and gastrointestinal     Objective     Blood pressure 106/76, pulse 58, temperature 97.8 °F (36.6 °C), temperature source Oral, resp. rate 14, height 70\" (177.8 cm), weight 202 lb 6 oz (91.8 kg), SpO2 94 %.    Physical Exam   Constitutional: Pt is oriented to person, place, and time and well-developed, well-nourished, and in no distress.   HENT:   Mouth/Throat: Oropharynx is clear and moist.   Neck: Normal range of motion. Neck supple.   Cardiovascular: Normal rate, regular rhythm and normal heart sounds.    Pulmonary/Chest: Effort normal and breath sounds normal. No respiratory distress. No  wheezes.   Abdominal: Soft. Bowel sounds are normal.   Skin: Skin is warm and dry.   Psychiatric: Mood, memory, affect and judgment normal.     Assessment/Plan     Diagnosis:  Rectal bleeding, history of polyps    Anticipated Surgical Procedure:  colonoscopy    The risks, benefits, and alternatives of this procedure have been " discussed with the patient or the responsible party- the patient understands and agrees to proceed.

## 2017-04-03 NOTE — ANESTHESIA PREPROCEDURE EVALUATION
Anesthesia Evaluation     Patient summary reviewed   no history of anesthetic complications:  NPO Status: > 6 hours   Airway   Mallampati: III  TM distance: >3 FB  Neck ROM: full  no difficulty expected  Dental - normal exam     Pulmonary - normal exam   Cardiovascular - normal exam    (+) hypertension, CAD, CABG,   (-) angina      Neuro/Psych  GI/Hepatic/Renal/Endo      ROS Comment: Personal h/o colon polyp and rectal bleed    Musculoskeletal     Abdominal    Substance History      OB/GYN          Other                                  Anesthesia Plan    ASA 3     MAC     Anesthetic plan and risks discussed with patient.

## 2017-04-03 NOTE — ANESTHESIA POSTPROCEDURE EVALUATION
Patient: Kev Mae    Procedure Summary     Date Anesthesia Start Anesthesia Stop Room / Location    04/03/17 [id number]  [facility]       Procedure Diagnosis Surgeon Provider    COLONOSCOPY TO CECUM WITH COLD POLYPECTOMIES, COLD BIOPSIES (N/A ) Personal history of colonic polyps; Rectal bleeding  (Personal history of colonic polyps [Z86.010]; Rectal bleeding [K62.5]) MD Leland Real MD          Anesthesia Type: MAC  Last vitals  BP      Temp      Pulse     Resp      SpO2        Post Anesthesia Care and Evaluation    Patient location during evaluation: PHASE II  Anesthetic complications: No anesthetic complications

## 2017-04-05 LAB
CYTO UR: NORMAL
LAB AP CASE REPORT: NORMAL
Lab: NORMAL
PATH REPORT.FINAL DX SPEC: NORMAL
PATH REPORT.GROSS SPEC: NORMAL

## 2017-04-10 ENCOUNTER — TELEPHONE (OUTPATIENT)
Dept: GASTROENTEROLOGY | Facility: CLINIC | Age: 57
End: 2017-04-10

## 2017-04-10 DIAGNOSIS — K63.5 HYPERPLASTIC COLON POLYP: Primary | ICD-10-CM

## 2017-04-10 NOTE — TELEPHONE ENCOUNTER
----- Message from Chas Mae sent at 4/8/2017 12:06 PM EDT -----  Regarding: Test Results Question  Contact: [phone]  dr yeung called on thursday to speak with chas whom was unavailable and she was to return the call at 330 that same day but we have not had a call back. waiting for biopsy results  thank you [phone] or [phone]

## 2017-04-10 NOTE — PROGRESS NOTES
Called and spoke to him about his results.  The large hyperplastic polyp needs to be removed due to it's size.  Referral to Dr Tang, he is aware.

## 2017-04-12 ENCOUNTER — TELEPHONE (OUTPATIENT)
Dept: GASTROENTEROLOGY | Facility: CLINIC | Age: 57
End: 2017-04-12

## 2017-04-12 NOTE — TELEPHONE ENCOUNTER
----- Message from Concepción Taylor MD sent at 4/10/2017 12:11 PM EDT -----  Called and spoke to him about his results.  The large hyperplastic polyp needs to be removed due to it's size.  Referral to Dr Tang, he is aware.

## 2017-04-17 PROBLEM — H53.2 BINOCULAR VISION DISORDER WITH DIPLOPIA: Status: ACTIVE | Noted: 2017-04-17

## 2017-04-18 ENCOUNTER — HOSPITAL ENCOUNTER (OUTPATIENT)
Dept: OTHER | Facility: HOSPITAL | Age: 57
Discharge: HOME | End: 2017-04-18
Attending: ORTHOPAEDIC SURGERY | Admitting: ORTHOPAEDIC SURGERY

## 2017-04-18 ENCOUNTER — OFFICE VISIT (OUTPATIENT)
Dept: SURGERY | Facility: CLINIC | Age: 57
End: 2017-04-18

## 2017-04-18 VITALS
WEIGHT: 203.7 LBS | DIASTOLIC BLOOD PRESSURE: 70 MMHG | HEART RATE: 61 BPM | TEMPERATURE: 98.3 F | HEIGHT: 70 IN | SYSTOLIC BLOOD PRESSURE: 138 MMHG | BODY MASS INDEX: 29.16 KG/M2

## 2017-04-18 DIAGNOSIS — K62.89 RECTAL MASS: Primary | ICD-10-CM

## 2017-04-18 LAB
ABO + RH BLD: NORMAL
ANION GAP SERPL CALC-SCNC: 12.3 MMOL/L (ref 10–20)
ARMBAND: NORMAL
BLD COMPONENT TYPE: NORMAL
BLD GP AB SCN SERPL QL: NEGATIVE
BUN SERPL-MCNC: 10 MG/DL (ref 8–20)
BUN/CREAT SERPL: 11.1 (ref 6.2–20.3)
CALCIUM SERPL-MCNC: 9.3 MG/DL (ref 8.9–10.3)
CHLORIDE SERPL-SCNC: 106 MMOL/L (ref 101–111)
CONV CO2: 26 MMOL/L (ref 22–32)
CREAT UR-MCNC: 0.9 MG/DL (ref 0.7–1.2)
CROSSMATCH EXPIRATION: NORMAL
GLUCOSE SERPL-MCNC: 104 MG/DL (ref 65–99)
POTASSIUM SERPL-SCNC: 4.3 MMOL/L (ref 3.6–5.1)
SODIUM SERPL-SCNC: 140 MMOL/L (ref 136–144)

## 2017-04-18 PROCEDURE — 99243 OFF/OP CNSLTJ NEW/EST LOW 30: CPT | Performed by: COLON & RECTAL SURGERY

## 2017-04-18 NOTE — PROGRESS NOTES
Kev Mae is a 56 y.o. male who is seen as a consult at the request of Concepción Taylor MD for rectal mass.      HPI:    Dr. Taylor found rectal mass on colonoscopy 3 April 2017    CT abd/pelvis Feb 2017: showed renal mass    Nephrectomy 2 May 2017 Dr. Grier for renal mass    Dr. Guerrero mesh L hernia repair Dec 2016  Denies abd pain or rectal pain  No change in bm    Past Medical History:   Diagnosis Date   • CAD (coronary artery disease)    • Carotid artery plaque    • Chest pain    • Colon polyps    • Diaphoresis    • Diplopia    • Hyperlipidemia    • Hypertension    • Myocardial infarction 2009    INFERIOR   • Nausea    • Past myocardial infarction    • Renal mass 03/08/2017       • SOB (shortness of breath)        Past Surgical History:   Procedure Laterality Date   • AMPUTATION DIGIT      X2   • CARDIAC CATHETERIZATION N/A 2/10/2017    Procedure: Left Heart Cath;  Surgeon: Leland Carpio MD;  Location: [facility] CATH INVASIVE LOCATION;  Service:    • CARDIAC CATHETERIZATION N/A 2009       • CARDIAC CATHETERIZATION N/A 08/26/2015       • CARDIAC CATHETERIZATION N/A 06/28/2012       • COLONOSCOPY N/A 4/3/2017    Procedure: COLONOSCOPY TO CECUM WITH COLD POLYPECTOMIES, COLD BIOPSIES;  Surgeon: Concepción Taylor MD;  Location: [facility] ENDOSCOPY;  Service:    • CORONARY ARTERY BYPASS GRAFT N/A 2009    3 VESSEL   • CORONARY ARTERY BYPASS GRAFT N/A 2009   • CORONARY ARTERY BYPASS GRAFT N/A    • FINGER SURGERY     • HERNIA REPAIR     • ROTATOR CUFF REPAIR Left    • VASECTOMY N/A     HAD 2 SURGERIES       Social History:   reports that he quit smoking about 8 years ago. He has never used smokeless tobacco. He reports that he drinks alcohol. He reports that he does not use illicit drugs.      Marriage status:     Family History   Problem Relation Age of Onset   • Heart attack Mother    • Leukemia Mother    • Heart attack Brother    • Coronary artery disease Brother    • Heart disease  Brother    • Deep vein thrombosis Cousin      with embolic death   • Coronary artery disease Father    • Heart disease Father          Current Outpatient Prescriptions:   •  aspirin 81 MG tablet, Take 1 tablet by mouth Daily., Disp: , Rfl:   •  folic acid (FOLVITE) 800 MCG tablet, Take 1 tablet by mouth Daily. As directed, Disp: , Rfl:   •  isosorbide mononitrate (IMDUR) 30 MG 24 hr tablet, Take 1 tablet by mouth Daily., Disp: , Rfl:   •  lisinopril (PRINIVIL,ZESTRIL) 10 MG tablet, Take 1 tablet by mouth Daily., Disp: , Rfl:   •  metoprolol succinate XL (TOPROL XL) 50 MG 24 hr tablet, Take 1 tablet by mouth Daily., Disp: , Rfl:   •  MULTIPLE VITAMIN PO, Take  by mouth Daily., Disp: , Rfl:   •  simvastatin (ZOCOR) 20 MG tablet, Take 1 tablet by mouth Daily., Disp: , Rfl:     Allergy  Review of patient's allergies indicates no known allergies.    Review of Systems   Constitution: Negative.   HENT: Negative.    Eyes: Negative.    Cardiovascular: Negative.    Respiratory: Negative.    Endocrine: Negative.    Hematologic/Lymphatic: Negative.    Skin: Negative.    Musculoskeletal: Negative.    Gastrointestinal: Negative.    Genitourinary: Negative.    Neurological: Negative.    Psychiatric/Behavioral: Negative.    Allergic/Immunologic: Negative.    All other systems reviewed and are negative.      Vitals:    04/18/17 0856   BP: 138/70   Pulse: 61   Temp: 98.3 °F (36.8 °C)     Body mass index is 29.23 kg/(m^2).    Physical Exam   Constitutional: He is oriented to person, place, and time. He appears well-developed and well-nourished. No distress.   HENT:   Head: Normocephalic and atraumatic.   Nose: Nose normal.   Mouth/Throat: Oropharynx is clear and moist.   Eyes: Conjunctivae and EOM are normal. Pupils are equal, round, and reactive to light.   Neck: Normal range of motion. No tracheal deviation present.   Pulmonary/Chest: Effort normal and breath sounds normal. No respiratory distress.   Abdominal: Soft. Bowel sounds  are normal. He exhibits no distension.   Musculoskeletal: Normal range of motion. He exhibits no edema or deformity.   Neurological: He is alert and oriented to person, place, and time. No cranial nerve deficit. Coordination and gait normal.   Skin: Skin is warm and dry.   Psychiatric: He has a normal mood and affect. His behavior is normal. Judgment normal.       Review of Medical Record:  I reviewed Dr. Taylor note, colonoscopy, images, report, and pathology  Reviewed abd/pelvis CT Feb 2017 report and images    Assessment:  1. Rectal mass        Plan:    Flexible sigmoidoscopy & EUS    I recommend doing an endorectal ultrasound and flex sig to work up the rectal mass.  Once the workup is done, I'll make recommendations based on the results.   I described with patient typical surgical time, postop recovery including pain management, and restrictions. I discussed with patient risks, benefits, and alternatives.  The patient had opportunity to ask questions.  I answered all questions.  Patient understands and wishes to proceed with procedure.    Scribed for Claudia Tang MD by Rosario Keller PA-C 4/18/2017  This patient was evaluated by me, recommendations made, documentation reviewed, edited, and revised by me, Claudia Tang MD          EMR Dragon/Transcription disclaimer:   Much of this encounter note is an electronic transcription/translation of spoken language to printed text. The electronic translation of spoken language may permit erroneous, or at times, nonsensical words or phrases to be inadvertently transcribed; Although I have reviewed the note for such errors, some may still exist.

## 2017-04-21 ENCOUNTER — ANESTHESIA EVENT (OUTPATIENT)
Dept: GASTROENTEROLOGY | Facility: HOSPITAL | Age: 57
End: 2017-04-21

## 2017-04-21 ENCOUNTER — HOSPITAL ENCOUNTER (OUTPATIENT)
Facility: HOSPITAL | Age: 57
Setting detail: HOSPITAL OUTPATIENT SURGERY
Discharge: HOME | End: 2017-04-21
Attending: COLON & RECTAL SURGERY | Admitting: COLON & RECTAL SURGERY

## 2017-04-21 ENCOUNTER — ANESTHESIA (OUTPATIENT)
Dept: GASTROENTEROLOGY | Facility: HOSPITAL | Age: 57
End: 2017-04-21

## 2017-04-21 VITALS
HEIGHT: 70 IN | TEMPERATURE: 97.8 F | BODY MASS INDEX: 28.53 KG/M2 | OXYGEN SATURATION: 97 % | SYSTOLIC BLOOD PRESSURE: 119 MMHG | RESPIRATION RATE: 16 BRPM | WEIGHT: 199.25 LBS | DIASTOLIC BLOOD PRESSURE: 80 MMHG | HEART RATE: 60 BPM

## 2017-04-21 PROCEDURE — 25010000002 PROPOFOL 10 MG/ML EMULSION: Performed by: ANESTHESIOLOGY

## 2017-04-21 PROCEDURE — 45335 SIGMOIDOSCOPY W/SUBMUC INJ: CPT | Performed by: COLON & RECTAL SURGERY

## 2017-04-21 RX ORDER — SODIUM CHLORIDE, SODIUM LACTATE, POTASSIUM CHLORIDE, CALCIUM CHLORIDE 600; 310; 30; 20 MG/100ML; MG/100ML; MG/100ML; MG/100ML
1000 INJECTION, SOLUTION INTRAVENOUS CONTINUOUS PRN
Status: DISCONTINUED | OUTPATIENT
Start: 2017-04-21 | End: 2017-04-21 | Stop reason: HOSPADM

## 2017-04-21 RX ORDER — PROPOFOL 10 MG/ML
VIAL (ML) INTRAVENOUS CONTINUOUS PRN
Status: DISCONTINUED | OUTPATIENT
Start: 2017-04-21 | End: 2017-04-21 | Stop reason: SURG

## 2017-04-21 RX ORDER — PROPOFOL 10 MG/ML
VIAL (ML) INTRAVENOUS AS NEEDED
Status: DISCONTINUED | OUTPATIENT
Start: 2017-04-21 | End: 2017-04-21 | Stop reason: SURG

## 2017-04-21 RX ORDER — SODIUM CHLORIDE, SODIUM LACTATE, POTASSIUM CHLORIDE, CALCIUM CHLORIDE 600; 310; 30; 20 MG/100ML; MG/100ML; MG/100ML; MG/100ML
INJECTION, SOLUTION INTRAVENOUS CONTINUOUS PRN
Status: DISCONTINUED | OUTPATIENT
Start: 2017-04-21 | End: 2017-04-21 | Stop reason: SURG

## 2017-04-21 RX ORDER — LIDOCAINE HYDROCHLORIDE 20 MG/ML
INJECTION, SOLUTION INFILTRATION; PERINEURAL AS NEEDED
Status: DISCONTINUED | OUTPATIENT
Start: 2017-04-21 | End: 2017-04-21 | Stop reason: SURG

## 2017-04-21 RX ADMIN — SODIUM CHLORIDE, POTASSIUM CHLORIDE, SODIUM LACTATE AND CALCIUM CHLORIDE: 600; 310; 30; 20 INJECTION, SOLUTION INTRAVENOUS at 07:00

## 2017-04-21 RX ADMIN — PROPOFOL 140 MCG/KG/MIN: 10 INJECTION, EMULSION INTRAVENOUS at 07:08

## 2017-04-21 RX ADMIN — PROPOFOL 150 MG: 10 INJECTION, EMULSION INTRAVENOUS at 07:08

## 2017-04-21 RX ADMIN — LIDOCAINE HYDROCHLORIDE 50 MG: 20 INJECTION, SOLUTION INFILTRATION; PERINEURAL at 07:08

## 2017-04-21 RX ADMIN — SODIUM CHLORIDE, POTASSIUM CHLORIDE, SODIUM LACTATE AND CALCIUM CHLORIDE 1000 ML: 600; 310; 30; 20 INJECTION, SOLUTION INTRAVENOUS at 06:34

## 2017-04-21 NOTE — PLAN OF CARE
Problem: Patient Care Overview (Adult)  Goal: Plan of Care Review  Outcome: Ongoing (interventions implemented as appropriate)    04/21/17 0617   Coping/Psychosocial Response Interventions   Plan Of Care Reviewed With patient       Goal: Adult Individualization and Mutuality  Outcome: Ongoing (interventions implemented as appropriate)    04/21/17 0617   Individualization   Patient Specific Preferences none       Goal: Discharge Needs Assessment  Outcome: Ongoing (interventions implemented as appropriate)    04/21/17 0617   Discharge Needs Assessment   Concerns To Be Addressed no discharge needs identified   Living Environment   Transportation Available family or friend will provide         Problem: GI Endoscopy (Adult)  Goal: Signs and Symptoms of Listed Potential Problems Will be Absent or Manageable (GI Endoscopy)  Outcome: Ongoing (interventions implemented as appropriate)    04/21/17 0617   GI Endoscopy   Problems Assessed (GI Endoscopy) pain   Problems Present (GI Endoscopy) none

## 2017-04-21 NOTE — ANESTHESIA POSTPROCEDURE EVALUATION
Patient: Kev Mae    Procedure Summary     Date Anesthesia Start Anesthesia Stop Room / Location    04/21/17 0700 0731  [facility] /  [facility]       Procedure Diagnosis Surgeon Provider    SIGMOIDOSCOPY FLEXIBLE to Descending Colon, injected with 7 ML Spot Ink (N/A ) Rectal mass  (Rectal mass [K62.9]) MD Figueroa Little MD          Anesthesia Type: MAC  Last vitals  BP 94/64 (04/21/17 0734)    Temp      Pulse 57 (04/21/17 0734)   Resp 16 (04/21/17 0734)    SpO2        Post Anesthesia Care and Evaluation    Patient location during evaluation: PACU  Patient participation: complete - patient participated  Level of consciousness: awake and alert  Pain score: 0  Pain management: adequate  Airway patency: patent  Anesthetic complications: No anesthetic complications    Cardiovascular status: acceptable  Respiratory status: acceptable  Hydration status: acceptable

## 2017-04-21 NOTE — PLAN OF CARE
Problem: GI Endoscopy (Adult)  Intervention: Prevent Genevieve-procedural Injury    04/21/17 0656   Positioning   Positioning side lying, left   Head Of Bed (HOB) Position HOB elevated

## 2017-04-21 NOTE — H&P (VIEW-ONLY)
Kev Mae is a 56 y.o. male who is seen as a consult at the request of Concepción Taylor MD for rectal mass.      HPI:    Dr. Taylor found rectal mass on colonoscopy 3 April 2017    CT abd/pelvis Feb 2017: showed renal mass    Nephrectomy 2 May 2017 Dr. Grier for renal mass    Dr. Guerrero mesh L hernia repair Dec 2016  Denies abd pain or rectal pain  No change in bm    Past Medical History:   Diagnosis Date   • CAD (coronary artery disease)    • Carotid artery plaque    • Chest pain    • Colon polyps    • Diaphoresis    • Diplopia    • Hyperlipidemia    • Hypertension    • Myocardial infarction 2009    INFERIOR   • Nausea    • Past myocardial infarction    • Renal mass 03/08/2017       • SOB (shortness of breath)        Past Surgical History:   Procedure Laterality Date   • AMPUTATION DIGIT      X2   • CARDIAC CATHETERIZATION N/A 2/10/2017    Procedure: Left Heart Cath;  Surgeon: Leland Carpio MD;  Location: [facility] CATH INVASIVE LOCATION;  Service:    • CARDIAC CATHETERIZATION N/A 2009       • CARDIAC CATHETERIZATION N/A 08/26/2015       • CARDIAC CATHETERIZATION N/A 06/28/2012       • COLONOSCOPY N/A 4/3/2017    Procedure: COLONOSCOPY TO CECUM WITH COLD POLYPECTOMIES, COLD BIOPSIES;  Surgeon: Concepción Taylor MD;  Location: [facility] ENDOSCOPY;  Service:    • CORONARY ARTERY BYPASS GRAFT N/A 2009    3 VESSEL   • CORONARY ARTERY BYPASS GRAFT N/A 2009   • CORONARY ARTERY BYPASS GRAFT N/A    • FINGER SURGERY     • HERNIA REPAIR     • ROTATOR CUFF REPAIR Left    • VASECTOMY N/A     HAD 2 SURGERIES       Social History:   reports that he quit smoking about 8 years ago. He has never used smokeless tobacco. He reports that he drinks alcohol. He reports that he does not use illicit drugs.      Marriage status:     Family History   Problem Relation Age of Onset   • Heart attack Mother    • Leukemia Mother    • Heart attack Brother    • Coronary artery disease Brother    • Heart disease  Brother    • Deep vein thrombosis Cousin      with embolic death   • Coronary artery disease Father    • Heart disease Father          Current Outpatient Prescriptions:   •  aspirin 81 MG tablet, Take 1 tablet by mouth Daily., Disp: , Rfl:   •  folic acid (FOLVITE) 800 MCG tablet, Take 1 tablet by mouth Daily. As directed, Disp: , Rfl:   •  isosorbide mononitrate (IMDUR) 30 MG 24 hr tablet, Take 1 tablet by mouth Daily., Disp: , Rfl:   •  lisinopril (PRINIVIL,ZESTRIL) 10 MG tablet, Take 1 tablet by mouth Daily., Disp: , Rfl:   •  metoprolol succinate XL (TOPROL XL) 50 MG 24 hr tablet, Take 1 tablet by mouth Daily., Disp: , Rfl:   •  MULTIPLE VITAMIN PO, Take  by mouth Daily., Disp: , Rfl:   •  simvastatin (ZOCOR) 20 MG tablet, Take 1 tablet by mouth Daily., Disp: , Rfl:     Allergy  Review of patient's allergies indicates no known allergies.    Review of Systems   Constitution: Negative.   HENT: Negative.    Eyes: Negative.    Cardiovascular: Negative.    Respiratory: Negative.    Endocrine: Negative.    Hematologic/Lymphatic: Negative.    Skin: Negative.    Musculoskeletal: Negative.    Gastrointestinal: Negative.    Genitourinary: Negative.    Neurological: Negative.    Psychiatric/Behavioral: Negative.    Allergic/Immunologic: Negative.    All other systems reviewed and are negative.      Vitals:    04/18/17 0856   BP: 138/70   Pulse: 61   Temp: 98.3 °F (36.8 °C)     Body mass index is 29.23 kg/(m^2).    Physical Exam   Constitutional: He is oriented to person, place, and time. He appears well-developed and well-nourished. No distress.   HENT:   Head: Normocephalic and atraumatic.   Nose: Nose normal.   Mouth/Throat: Oropharynx is clear and moist.   Eyes: Conjunctivae and EOM are normal. Pupils are equal, round, and reactive to light.   Neck: Normal range of motion. No tracheal deviation present.   Pulmonary/Chest: Effort normal and breath sounds normal. No respiratory distress.   Abdominal: Soft. Bowel sounds  are normal. He exhibits no distension.   Musculoskeletal: Normal range of motion. He exhibits no edema or deformity.   Neurological: He is alert and oriented to person, place, and time. No cranial nerve deficit. Coordination and gait normal.   Skin: Skin is warm and dry.   Psychiatric: He has a normal mood and affect. His behavior is normal. Judgment normal.       Review of Medical Record:  I reviewed Dr. Taylor note, colonoscopy, images, report, and pathology  Reviewed abd/pelvis CT Feb 2017 report and images    Assessment:  1. Rectal mass        Plan:    Flexible sigmoidoscopy & EUS    I recommend doing an endorectal ultrasound and flex sig to work up the rectal mass.  Once the workup is done, I'll make recommendations based on the results.   I described with patient typical surgical time, postop recovery including pain management, and restrictions. I discussed with patient risks, benefits, and alternatives.  The patient had opportunity to ask questions.  I answered all questions.  Patient understands and wishes to proceed with procedure.    Scribed for Claudia Tang MD by Rosario Keller PA-C 4/18/2017  This patient was evaluated by me, recommendations made, documentation reviewed, edited, and revised by me, Claudia Tang MD          EMR Dragon/Transcription disclaimer:   Much of this encounter note is an electronic transcription/translation of spoken language to printed text. The electronic translation of spoken language may permit erroneous, or at times, nonsensical words or phrases to be inadvertently transcribed; Although I have reviewed the note for such errors, some may still exist.

## 2017-04-21 NOTE — ANESTHESIA PREPROCEDURE EVALUATION
Anesthesia Evaluation     Patient summary reviewed      Airway   Mallampati: II  TM distance: >3 FB  Neck ROM: full  no difficulty expected  Dental    (+) edentulous    Pulmonary     breath sounds clear to auscultation  Cardiovascular   Exercise tolerance: good (4-7 METS)    Rhythm: regular  Rate: normal    (+) CABG,       Neuro/Psych  GI/Hepatic/Renal/Endo      Musculoskeletal     Abdominal    Substance History      OB/GYN          Other                                    Anesthesia Plan    ASA 3     MAC     intravenous induction   Anesthetic plan and risks discussed with patient.

## 2017-04-25 ENCOUNTER — OFFICE VISIT (OUTPATIENT)
Dept: SURGERY | Facility: CLINIC | Age: 57
End: 2017-04-25

## 2017-04-25 VITALS
BODY MASS INDEX: 29.06 KG/M2 | DIASTOLIC BLOOD PRESSURE: 88 MMHG | OXYGEN SATURATION: 97 % | SYSTOLIC BLOOD PRESSURE: 132 MMHG | HEIGHT: 70 IN | TEMPERATURE: 98.2 F | WEIGHT: 203 LBS | HEART RATE: 65 BPM

## 2017-04-25 DIAGNOSIS — K63.89 COLONIC MASS: Primary | ICD-10-CM

## 2017-04-25 PROCEDURE — 99213 OFFICE O/P EST LOW 20 MIN: CPT | Performed by: COLON & RECTAL SURGERY

## 2017-04-25 RX ORDER — ALVIMOPAN 12 MG/1
12 CAPSULE ORAL ONCE
Status: CANCELLED | OUTPATIENT
Start: 2017-04-25 | End: 2017-04-25

## 2017-04-25 RX ORDER — NEOMYCIN SULFATE 500 MG/1
TABLET ORAL
Qty: 6 TABLET | Refills: 0 | Status: SHIPPED | OUTPATIENT
Start: 2017-04-25 | End: 2017-05-29 | Stop reason: HOSPADM

## 2017-04-25 RX ORDER — METRONIDAZOLE 500 MG/1
TABLET ORAL
Qty: 3 TABLET | Refills: 0 | Status: SHIPPED | OUTPATIENT
Start: 2017-04-25 | End: 2017-05-08

## 2017-04-25 NOTE — PROGRESS NOTES
"Kev Mae is a 56 y.o. male in for follow up of Colonic mass   s/p flexible sigmoidoscopy 4/21/2017    Pt doing well today  No rectal bleeding  No Abd pain    Past Medical History:   Diagnosis Date   • CAD (coronary artery disease)    • Carotid artery plaque    • Chest pain    • Colon polyps    • Diaphoresis    • Diplopia    • Hyperlipidemia    • Hypertension    • Myocardial infarction 2009    INFERIOR   • Nausea    • Past myocardial infarction    • Renal cancer    • Renal mass 03/08/2017    , LEFT   • SOB (shortness of breath)      Past Surgical History:   Procedure Laterality Date   • AMPUTATION DIGIT      X2   • CARDIAC CATHETERIZATION N/A 2/10/2017    Procedure: Left Heart Cath;  Surgeon: Leland Carpio MD;  Location: [facility] CATH INVASIVE LOCATION;  Service:    • CARDIAC CATHETERIZATION N/A 2009       • CARDIAC CATHETERIZATION N/A 08/26/2015       • CARDIAC CATHETERIZATION N/A 06/28/2012       • COLONOSCOPY N/A 4/3/2017    Procedure: COLONOSCOPY TO CECUM WITH COLD POLYPECTOMIES, COLD BIOPSIES;  Surgeon: Concepción Taylor MD;  Location: [facility] ENDOSCOPY;  Service:    • CORONARY ARTERY BYPASS GRAFT N/A 2009    3 VESSEL   • FINGER SURGERY     • HERNIA REPAIR     • ROTATOR CUFF REPAIR Left    • SIGMOIDOSCOPY N/A 4/21/2017    Procedure: SIGMOIDOSCOPY FLEXIBLE to Descending Colon, injected with 7 ML Spot Ink;  Surgeon: Claudia Tang MD;  Location: [facility] ENDOSCOPY;  Service:    • VASECTOMY N/A     HAD 2 SURGERIES         /88  Pulse 65  Temp 98.2 °F (36.8 °C)  Ht 70\" (177.8 cm)  Wt 203 lb (92.1 kg)  SpO2 97%  BMI 29.13 kg/m2  Body mass index is 29.13 kg/(m^2).      PE:  Physical Exam   Constitutional: He appears well-developed. No distress.   HENT:   Head: Normocephalic and atraumatic.   Abdominal: Soft. He exhibits no distension.   Musculoskeletal: Normal range of motion.   Neurological: He is alert.   Psychiatric: Thought content normal.         Assessment:   1. " Colonic mass     s/p flexible sigmoidoscopy 4/21/2017  At distal sigmoid - tatooed    Plan:    I recommended to patient a laparoscopic sigmoid colon resection resection and possible diverting loop ileostomy.  I discussed with them typical surgical time, hospital recovery, and home recovery.   I described to the patient risks, benefits, and alternatives. I discussed risks of surgery being heart attack, stroke, exacerbation of chronic medical illness, pneumonia, DVT, PTE, infection, bleeding, and injury to other organs during surgery. Patient expresses understanding and wishes to proceed.    Will discuss scheduling with Dr. Thompson: nephrectomy scheduled 2 May 2017 for renal mass. May need to be resched to do combo case.    Scribed for Claudia Tang MD by Rosario Keller PA-C 4/25/2017  This patient was evaluated by me, recommendations made, documentation reviewed, edited, and revised by me, Claudia Tang MD      EMR Dragon/Transcription disclaimer:   Much of this encounter note is an electronic transcription/translation of spoken language to printed text. The electronic translation of spoken language may permit erroneous, or at times, nonsensical words or phrases to be inadvertently transcribed; Although I have reviewed the note for such errors, some may still exist.

## 2017-04-27 ENCOUNTER — TELEPHONE (OUTPATIENT)
Dept: CARDIOLOGY | Facility: CLINIC | Age: 57
End: 2017-04-27

## 2017-05-08 ENCOUNTER — APPOINTMENT (OUTPATIENT)
Dept: PREADMISSION TESTING | Facility: HOSPITAL | Age: 57
End: 2017-05-08

## 2017-05-08 VITALS
RESPIRATION RATE: 16 BRPM | DIASTOLIC BLOOD PRESSURE: 70 MMHG | TEMPERATURE: 97.6 F | BODY MASS INDEX: 29.29 KG/M2 | WEIGHT: 204.56 LBS | HEART RATE: 57 BPM | SYSTOLIC BLOOD PRESSURE: 106 MMHG | HEIGHT: 70 IN | OXYGEN SATURATION: 96 %

## 2017-05-08 DIAGNOSIS — K63.89 COLONIC MASS: ICD-10-CM

## 2017-05-08 LAB
ANION GAP SERPL CALCULATED.3IONS-SCNC: 10.8 MMOL/L
BUN BLD-MCNC: 14 MG/DL (ref 6–20)
BUN/CREAT SERPL: 16.1 (ref 7–25)
CALCIUM SPEC-SCNC: 9.6 MG/DL (ref 8.6–10.5)
CEA SERPL-MCNC: 2.79 NG/ML
CHLORIDE SERPL-SCNC: 102 MMOL/L (ref 98–107)
CO2 SERPL-SCNC: 27.2 MMOL/L (ref 22–29)
CREAT BLD-MCNC: 0.87 MG/DL (ref 0.76–1.27)
DEPRECATED RDW RBC AUTO: 43.2 FL (ref 37–54)
ERYTHROCYTE [DISTWIDTH] IN BLOOD BY AUTOMATED COUNT: 13.1 % (ref 11.5–14.5)
GFR SERPL CREATININE-BSD FRML MDRD: 91 ML/MIN/1.73
GLUCOSE BLD-MCNC: 84 MG/DL (ref 65–99)
HCT VFR BLD AUTO: 43.6 % (ref 40.4–52.2)
HGB BLD-MCNC: 14.9 G/DL (ref 13.7–17.6)
MCH RBC QN AUTO: 31.2 PG (ref 27–32.7)
MCHC RBC AUTO-ENTMCNC: 34.2 G/DL (ref 32.6–36.4)
MCV RBC AUTO: 91.2 FL (ref 79.8–96.2)
PLATELET # BLD AUTO: 205 10*3/MM3 (ref 140–500)
PMV BLD AUTO: 10.7 FL (ref 6–12)
POTASSIUM BLD-SCNC: 4 MMOL/L (ref 3.5–5.2)
RBC # BLD AUTO: 4.78 10*6/MM3 (ref 4.6–6)
SODIUM BLD-SCNC: 140 MMOL/L (ref 136–145)
WBC NRBC COR # BLD: 10.67 10*3/MM3 (ref 4.5–10.7)

## 2017-05-08 PROCEDURE — 36415 COLL VENOUS BLD VENIPUNCTURE: CPT

## 2017-05-08 PROCEDURE — 82378 CARCINOEMBRYONIC ANTIGEN: CPT | Performed by: PHYSICIAN ASSISTANT

## 2017-05-08 PROCEDURE — 80048 BASIC METABOLIC PNL TOTAL CA: CPT | Performed by: COLON & RECTAL SURGERY

## 2017-05-08 PROCEDURE — 85027 COMPLETE CBC AUTOMATED: CPT | Performed by: COLON & RECTAL SURGERY

## 2017-05-25 ENCOUNTER — HOSPITAL ENCOUNTER (INPATIENT)
Facility: HOSPITAL | Age: 57
LOS: 4 days | Discharge: HOME | End: 2017-05-29
Attending: COLON & RECTAL SURGERY | Admitting: COLON & RECTAL SURGERY

## 2017-05-25 ENCOUNTER — ANESTHESIA EVENT (OUTPATIENT)
Dept: PERIOP | Facility: HOSPITAL | Age: 57
End: 2017-05-25

## 2017-05-25 ENCOUNTER — ANESTHESIA (OUTPATIENT)
Dept: PERIOP | Facility: HOSPITAL | Age: 57
End: 2017-05-25

## 2017-05-25 DIAGNOSIS — R26.2 DIFFICULTY WALKING: Primary | ICD-10-CM

## 2017-05-25 DIAGNOSIS — K62.89 RECTAL MASS: ICD-10-CM

## 2017-05-25 DIAGNOSIS — K63.89 COLONIC MASS: ICD-10-CM

## 2017-05-25 DIAGNOSIS — N28.89 RENAL MASS, LEFT: ICD-10-CM

## 2017-05-25 LAB
ABO GROUP BLD: NORMAL
BLD GP AB SCN SERPL QL: NEGATIVE
RH BLD: POSITIVE

## 2017-05-25 PROCEDURE — 44213 LAP MOBIL SPLENIC FL ADD-ON: CPT | Performed by: PHYSICIAN ASSISTANT

## 2017-05-25 PROCEDURE — 86850 RBC ANTIBODY SCREEN: CPT | Performed by: PHYSICIAN ASSISTANT

## 2017-05-25 PROCEDURE — 25010000002 DEXAMETHASONE PER 1 MG: Performed by: NURSE ANESTHETIST, CERTIFIED REGISTERED

## 2017-05-25 PROCEDURE — 0DTN4ZZ RESECTION OF SIGMOID COLON, PERCUTANEOUS ENDOSCOPIC APPROACH: ICD-10-PCS | Performed by: COLON & RECTAL SURGERY

## 2017-05-25 PROCEDURE — C1765 ADHESION BARRIER: HCPCS | Performed by: COLON & RECTAL SURGERY

## 2017-05-25 PROCEDURE — 88309 TISSUE EXAM BY PATHOLOGIST: CPT | Performed by: COLON & RECTAL SURGERY

## 2017-05-25 PROCEDURE — 25010000002 FENTANYL CITRATE (PF) 100 MCG/2ML SOLUTION: Performed by: NURSE ANESTHETIST, CERTIFIED REGISTERED

## 2017-05-25 PROCEDURE — 88307 TISSUE EXAM BY PATHOLOGIST: CPT | Performed by: COLON & RECTAL SURGERY

## 2017-05-25 PROCEDURE — 86920 COMPATIBILITY TEST SPIN: CPT

## 2017-05-25 PROCEDURE — 25010000002 PROPOFOL 10 MG/ML EMULSION: Performed by: NURSE ANESTHETIST, CERTIFIED REGISTERED

## 2017-05-25 PROCEDURE — 44204 LAPARO PARTIAL COLECTOMY: CPT | Performed by: PHYSICIAN ASSISTANT

## 2017-05-25 PROCEDURE — 86901 BLOOD TYPING SEROLOGIC RH(D): CPT | Performed by: PHYSICIAN ASSISTANT

## 2017-05-25 PROCEDURE — 44213 LAP MOBIL SPLENIC FL ADD-ON: CPT | Performed by: COLON & RECTAL SURGERY

## 2017-05-25 PROCEDURE — 86900 BLOOD TYPING SEROLOGIC ABO: CPT | Performed by: PHYSICIAN ASSISTANT

## 2017-05-25 PROCEDURE — 25010000002 HYDROMORPHONE PER 4 MG: Performed by: NURSE ANESTHETIST, CERTIFIED REGISTERED

## 2017-05-25 PROCEDURE — 25010000002 NEOSTIGMINE 10 MG/10ML SOLUTION: Performed by: NURSE ANESTHETIST, CERTIFIED REGISTERED

## 2017-05-25 PROCEDURE — 88305 TISSUE EXAM BY PATHOLOGIST: CPT | Performed by: COLON & RECTAL SURGERY

## 2017-05-25 PROCEDURE — 25010000002 ONDANSETRON PER 1 MG: Performed by: COLON & RECTAL SURGERY

## 2017-05-25 PROCEDURE — 94799 UNLISTED PULMONARY SVC/PX: CPT

## 2017-05-25 PROCEDURE — 0TT14ZZ RESECTION OF LEFT KIDNEY, PERCUTANEOUS ENDOSCOPIC APPROACH: ICD-10-PCS | Performed by: UROLOGY

## 2017-05-25 PROCEDURE — 44204 LAPARO PARTIAL COLECTOMY: CPT | Performed by: COLON & RECTAL SURGERY

## 2017-05-25 PROCEDURE — 25010000002 MIDAZOLAM PER 1 MG: Performed by: ANESTHESIOLOGY

## 2017-05-25 PROCEDURE — 25010000002 ONDANSETRON PER 1 MG: Performed by: NURSE ANESTHETIST, CERTIFIED REGISTERED

## 2017-05-25 DEVICE — SEALANT FIBRIN TISSEEL FZ 4ML: Type: IMPLANTABLE DEVICE | Site: ABDOMEN | Status: FUNCTIONAL

## 2017-05-25 RX ORDER — SODIUM CHLORIDE, SODIUM LACTATE, POTASSIUM CHLORIDE, CALCIUM CHLORIDE 600; 310; 30; 20 MG/100ML; MG/100ML; MG/100ML; MG/100ML
100 INJECTION, SOLUTION INTRAVENOUS CONTINUOUS
Status: DISCONTINUED | OUTPATIENT
Start: 2017-05-25 | End: 2017-05-25 | Stop reason: DRUGHIGH

## 2017-05-25 RX ORDER — DIPHENHYDRAMINE HYDROCHLORIDE 50 MG/ML
25 INJECTION INTRAMUSCULAR; INTRAVENOUS EVERY 6 HOURS PRN
Status: DISCONTINUED | OUTPATIENT
Start: 2017-05-25 | End: 2017-05-29 | Stop reason: HOSPADM

## 2017-05-25 RX ORDER — SODIUM CHLORIDE 9 MG/ML
INJECTION, SOLUTION INTRAVENOUS AS NEEDED
Status: DISCONTINUED | OUTPATIENT
Start: 2017-05-25 | End: 2017-05-25 | Stop reason: HOSPADM

## 2017-05-25 RX ORDER — HYDROMORPHONE HCL 110MG/55ML
PATIENT CONTROLLED ANALGESIA SYRINGE INTRAVENOUS AS NEEDED
Status: DISCONTINUED | OUTPATIENT
Start: 2017-05-25 | End: 2017-05-25 | Stop reason: SURG

## 2017-05-25 RX ORDER — NALOXONE HCL 0.4 MG/ML
0.1 VIAL (ML) INJECTION
Status: DISCONTINUED | OUTPATIENT
Start: 2017-05-25 | End: 2017-05-29 | Stop reason: HOSPADM

## 2017-05-25 RX ORDER — PROPOFOL 10 MG/ML
VIAL (ML) INTRAVENOUS AS NEEDED
Status: DISCONTINUED | OUTPATIENT
Start: 2017-05-25 | End: 2017-05-25 | Stop reason: SURG

## 2017-05-25 RX ORDER — GLYCOPYRROLATE 0.2 MG/ML
INJECTION INTRAMUSCULAR; INTRAVENOUS AS NEEDED
Status: DISCONTINUED | OUTPATIENT
Start: 2017-05-25 | End: 2017-05-25 | Stop reason: SURG

## 2017-05-25 RX ORDER — DEXAMETHASONE SODIUM PHOSPHATE 10 MG/ML
INJECTION INTRAMUSCULAR; INTRAVENOUS AS NEEDED
Status: DISCONTINUED | OUTPATIENT
Start: 2017-05-25 | End: 2017-05-25 | Stop reason: SURG

## 2017-05-25 RX ORDER — MIDAZOLAM HYDROCHLORIDE 1 MG/ML
2 INJECTION INTRAMUSCULAR; INTRAVENOUS
Status: DISCONTINUED | OUTPATIENT
Start: 2017-05-25 | End: 2017-05-25 | Stop reason: HOSPADM

## 2017-05-25 RX ORDER — ALBUTEROL SULFATE 2.5 MG/3ML
2.5 SOLUTION RESPIRATORY (INHALATION) ONCE AS NEEDED
Status: DISCONTINUED | OUTPATIENT
Start: 2017-05-25 | End: 2017-05-25 | Stop reason: HOSPADM

## 2017-05-25 RX ORDER — ONDANSETRON 2 MG/ML
INJECTION INTRAMUSCULAR; INTRAVENOUS AS NEEDED
Status: DISCONTINUED | OUTPATIENT
Start: 2017-05-25 | End: 2017-05-25 | Stop reason: SURG

## 2017-05-25 RX ORDER — ROCURONIUM BROMIDE 10 MG/ML
INJECTION, SOLUTION INTRAVENOUS AS NEEDED
Status: DISCONTINUED | OUTPATIENT
Start: 2017-05-25 | End: 2017-05-25 | Stop reason: SURG

## 2017-05-25 RX ORDER — LABETALOL HYDROCHLORIDE 5 MG/ML
5 INJECTION, SOLUTION INTRAVENOUS
Status: DISCONTINUED | OUTPATIENT
Start: 2017-05-25 | End: 2017-05-25 | Stop reason: HOSPADM

## 2017-05-25 RX ORDER — PROMETHAZINE HYDROCHLORIDE 25 MG/1
25 SUPPOSITORY RECTAL ONCE AS NEEDED
Status: DISCONTINUED | OUTPATIENT
Start: 2017-05-25 | End: 2017-05-25 | Stop reason: HOSPADM

## 2017-05-25 RX ORDER — ACETAMINOPHEN 10 MG/ML
1000 INJECTION, SOLUTION INTRAVENOUS EVERY 8 HOURS PRN
Status: ACTIVE | OUTPATIENT
Start: 2017-05-25 | End: 2017-05-28

## 2017-05-25 RX ORDER — PROMETHAZINE HYDROCHLORIDE 25 MG/1
25 TABLET ORAL ONCE AS NEEDED
Status: DISCONTINUED | OUTPATIENT
Start: 2017-05-25 | End: 2017-05-25 | Stop reason: HOSPADM

## 2017-05-25 RX ORDER — PANTOPRAZOLE SODIUM 40 MG/10ML
40 INJECTION, POWDER, LYOPHILIZED, FOR SOLUTION INTRAVENOUS
Status: DISCONTINUED | OUTPATIENT
Start: 2017-05-25 | End: 2017-05-29 | Stop reason: HOSPADM

## 2017-05-25 RX ORDER — FENTANYL CITRATE 50 UG/ML
50 INJECTION, SOLUTION INTRAMUSCULAR; INTRAVENOUS
Status: DISCONTINUED | OUTPATIENT
Start: 2017-05-25 | End: 2017-05-25 | Stop reason: HOSPADM

## 2017-05-25 RX ORDER — NITROGLYCERIN 0.4 MG/1
0.4 TABLET SUBLINGUAL
Status: DISCONTINUED | OUTPATIENT
Start: 2017-05-25 | End: 2017-05-29 | Stop reason: HOSPADM

## 2017-05-25 RX ORDER — ONDANSETRON 2 MG/ML
4 INJECTION INTRAMUSCULAR; INTRAVENOUS ONCE AS NEEDED
Status: DISCONTINUED | OUTPATIENT
Start: 2017-05-25 | End: 2017-05-25 | Stop reason: HOSPADM

## 2017-05-25 RX ORDER — BUPIVACAINE HYDROCHLORIDE 2.5 MG/ML
INJECTION, SOLUTION INFILTRATION; PERINEURAL AS NEEDED
Status: DISCONTINUED | OUTPATIENT
Start: 2017-05-25 | End: 2017-05-25 | Stop reason: HOSPADM

## 2017-05-25 RX ORDER — LIDOCAINE HYDROCHLORIDE 20 MG/ML
INJECTION, SOLUTION INFILTRATION; PERINEURAL AS NEEDED
Status: DISCONTINUED | OUTPATIENT
Start: 2017-05-25 | End: 2017-05-25 | Stop reason: SURG

## 2017-05-25 RX ORDER — ALVIMOPAN 12 MG/1
12 CAPSULE ORAL 2 TIMES DAILY
Status: DISCONTINUED | OUTPATIENT
Start: 2017-05-25 | End: 2017-05-29

## 2017-05-25 RX ORDER — SODIUM CHLORIDE, SODIUM LACTATE, POTASSIUM CHLORIDE, CALCIUM CHLORIDE 600; 310; 30; 20 MG/100ML; MG/100ML; MG/100ML; MG/100ML
9 INJECTION, SOLUTION INTRAVENOUS CONTINUOUS
Status: DISCONTINUED | OUTPATIENT
Start: 2017-05-25 | End: 2017-05-25

## 2017-05-25 RX ORDER — SODIUM CHLORIDE, SODIUM LACTATE, POTASSIUM CHLORIDE, CALCIUM CHLORIDE 600; 310; 30; 20 MG/100ML; MG/100ML; MG/100ML; MG/100ML
INJECTION, SOLUTION INTRAVENOUS CONTINUOUS PRN
Status: DISCONTINUED | OUTPATIENT
Start: 2017-05-25 | End: 2017-05-25 | Stop reason: SURG

## 2017-05-25 RX ORDER — MAGNESIUM HYDROXIDE 1200 MG/15ML
LIQUID ORAL AS NEEDED
Status: DISCONTINUED | OUTPATIENT
Start: 2017-05-25 | End: 2017-05-25 | Stop reason: HOSPADM

## 2017-05-25 RX ORDER — SODIUM CHLORIDE 0.9 % (FLUSH) 0.9 %
1-10 SYRINGE (ML) INJECTION AS NEEDED
Status: DISCONTINUED | OUTPATIENT
Start: 2017-05-25 | End: 2017-05-25 | Stop reason: HOSPADM

## 2017-05-25 RX ORDER — HYDRALAZINE HYDROCHLORIDE 20 MG/ML
5 INJECTION INTRAMUSCULAR; INTRAVENOUS
Status: DISCONTINUED | OUTPATIENT
Start: 2017-05-25 | End: 2017-05-25 | Stop reason: HOSPADM

## 2017-05-25 RX ORDER — PROMETHAZINE HYDROCHLORIDE 25 MG/ML
12.5 INJECTION, SOLUTION INTRAMUSCULAR; INTRAVENOUS ONCE AS NEEDED
Status: DISCONTINUED | OUTPATIENT
Start: 2017-05-25 | End: 2017-05-25 | Stop reason: HOSPADM

## 2017-05-25 RX ORDER — ONDANSETRON 2 MG/ML
4 INJECTION INTRAMUSCULAR; INTRAVENOUS EVERY 6 HOURS PRN
Status: DISCONTINUED | OUTPATIENT
Start: 2017-05-25 | End: 2017-05-29 | Stop reason: HOSPADM

## 2017-05-25 RX ORDER — ALVIMOPAN 12 MG/1
12 CAPSULE ORAL ONCE
Status: COMPLETED | OUTPATIENT
Start: 2017-05-25 | End: 2017-05-25

## 2017-05-25 RX ORDER — BUPIVACAINE HYDROCHLORIDE 2.5 MG/ML
INJECTION, SOLUTION EPIDURAL; INFILTRATION; INTRACAUDAL AS NEEDED
Status: DISCONTINUED | OUTPATIENT
Start: 2017-05-25 | End: 2017-05-25 | Stop reason: HOSPADM

## 2017-05-25 RX ORDER — FENTANYL CITRATE 50 UG/ML
INJECTION, SOLUTION INTRAMUSCULAR; INTRAVENOUS AS NEEDED
Status: DISCONTINUED | OUTPATIENT
Start: 2017-05-25 | End: 2017-05-25 | Stop reason: SURG

## 2017-05-25 RX ORDER — HYDROCODONE BITARTRATE AND ACETAMINOPHEN 7.5; 325 MG/1; MG/1
1 TABLET ORAL ONCE AS NEEDED
Status: DISCONTINUED | OUTPATIENT
Start: 2017-05-25 | End: 2017-05-25 | Stop reason: HOSPADM

## 2017-05-25 RX ORDER — LANOLIN ALCOHOL/MO/W.PET/CERES
400 CREAM (GRAM) TOPICAL DAILY
COMMUNITY

## 2017-05-25 RX ORDER — NEOSTIGMINE METHYLSULFATE 1 MG/ML
INJECTION, SOLUTION INTRAVENOUS AS NEEDED
Status: DISCONTINUED | OUTPATIENT
Start: 2017-05-25 | End: 2017-05-25 | Stop reason: SURG

## 2017-05-25 RX ORDER — HYDROMORPHONE HYDROCHLORIDE 1 MG/ML
0.5 INJECTION, SOLUTION INTRAMUSCULAR; INTRAVENOUS; SUBCUTANEOUS
Status: DISCONTINUED | OUTPATIENT
Start: 2017-05-25 | End: 2017-05-25 | Stop reason: HOSPADM

## 2017-05-25 RX ORDER — ACETAMINOPHEN 10 MG/ML
INJECTION, SOLUTION INTRAVENOUS AS NEEDED
Status: DISCONTINUED | OUTPATIENT
Start: 2017-05-25 | End: 2017-05-25 | Stop reason: SURG

## 2017-05-25 RX ORDER — MIDAZOLAM HYDROCHLORIDE 1 MG/ML
1 INJECTION INTRAMUSCULAR; INTRAVENOUS
Status: DISCONTINUED | OUTPATIENT
Start: 2017-05-25 | End: 2017-05-25 | Stop reason: HOSPADM

## 2017-05-25 RX ORDER — HYDROMORPHONE HCL IN 0.9% NACL 10 MG/50ML
PATIENT CONTROLLED ANALGESIA SYRINGE INTRAVENOUS CONTINUOUS
Status: DISCONTINUED | OUTPATIENT
Start: 2017-05-25 | End: 2017-05-27

## 2017-05-25 RX ORDER — SODIUM CHLORIDE, SODIUM LACTATE, POTASSIUM CHLORIDE, CALCIUM CHLORIDE 600; 310; 30; 20 MG/100ML; MG/100ML; MG/100ML; MG/100ML
30 INJECTION, SOLUTION INTRAVENOUS CONTINUOUS
Status: DISCONTINUED | OUTPATIENT
Start: 2017-05-25 | End: 2017-05-29 | Stop reason: HOSPADM

## 2017-05-25 RX ORDER — FAMOTIDINE 10 MG/ML
20 INJECTION, SOLUTION INTRAVENOUS ONCE
Status: COMPLETED | OUTPATIENT
Start: 2017-05-25 | End: 2017-05-25

## 2017-05-25 RX ADMIN — DEXAMETHASONE SODIUM PHOSPHATE 8 MG: 10 INJECTION INTRAMUSCULAR; INTRAVENOUS at 10:00

## 2017-05-25 RX ADMIN — FENTANYL CITRATE 50 MCG: 50 INJECTION INTRAMUSCULAR; INTRAVENOUS at 14:08

## 2017-05-25 RX ADMIN — SODIUM CHLORIDE, POTASSIUM CHLORIDE, SODIUM LACTATE AND CALCIUM CHLORIDE: 600; 310; 30; 20 INJECTION, SOLUTION INTRAVENOUS at 06:06

## 2017-05-25 RX ADMIN — ROCURONIUM BROMIDE 10 MG: 10 INJECTION INTRAVENOUS at 11:32

## 2017-05-25 RX ADMIN — AMPICILLIN SODIUM AND SULBACTAM SODIUM 3 G: 2; 1 INJECTION, POWDER, FOR SOLUTION INTRAMUSCULAR; INTRAVENOUS at 14:19

## 2017-05-25 RX ADMIN — ROCURONIUM BROMIDE 10 MG: 10 INJECTION INTRAVENOUS at 09:05

## 2017-05-25 RX ADMIN — SODIUM CHLORIDE, POTASSIUM CHLORIDE, SODIUM LACTATE AND CALCIUM CHLORIDE 120 ML/HR: 600; 310; 30; 20 INJECTION, SOLUTION INTRAVENOUS at 18:28

## 2017-05-25 RX ADMIN — ONDANSETRON 4 MG: 2 INJECTION INTRAMUSCULAR; INTRAVENOUS at 18:35

## 2017-05-25 RX ADMIN — FENTANYL CITRATE 100 MCG: 50 INJECTION INTRAMUSCULAR; INTRAVENOUS at 07:32

## 2017-05-25 RX ADMIN — Medication: at 12:33

## 2017-05-25 RX ADMIN — ONDANSETRON 4 MG: 2 INJECTION INTRAMUSCULAR; INTRAVENOUS at 11:40

## 2017-05-25 RX ADMIN — PROPOFOL 200 MG: 10 INJECTION, EMULSION INTRAVENOUS at 07:32

## 2017-05-25 RX ADMIN — ALVIMOPAN 12 MG: 12 CAPSULE ORAL at 06:17

## 2017-05-25 RX ADMIN — SODIUM CHLORIDE, POTASSIUM CHLORIDE, SODIUM LACTATE AND CALCIUM CHLORIDE 100 ML/HR: 600; 310; 30; 20 INJECTION, SOLUTION INTRAVENOUS at 15:15

## 2017-05-25 RX ADMIN — PANTOPRAZOLE SODIUM 40 MG: 40 INJECTION, POWDER, FOR SOLUTION INTRAVENOUS at 12:41

## 2017-05-25 RX ADMIN — MIDAZOLAM 2 MG: 1 INJECTION INTRAMUSCULAR; INTRAVENOUS at 06:58

## 2017-05-25 RX ADMIN — SODIUM CHLORIDE, POTASSIUM CHLORIDE, SODIUM LACTATE AND CALCIUM CHLORIDE 9 ML/HR: 600; 310; 30; 20 INJECTION, SOLUTION INTRAVENOUS at 06:06

## 2017-05-25 RX ADMIN — FAMOTIDINE 20 MG: 10 INJECTION, SOLUTION INTRAVENOUS at 06:59

## 2017-05-25 RX ADMIN — HYDROMORPHONE HYDROCHLORIDE 0.5 MG: 2 INJECTION, SOLUTION INTRAMUSCULAR; INTRAVENOUS; SUBCUTANEOUS at 10:43

## 2017-05-25 RX ADMIN — GLYCOPYRROLATE 0.6 MG: 0.2 INJECTION INTRAMUSCULAR; INTRAVENOUS at 11:48

## 2017-05-25 RX ADMIN — SODIUM CHLORIDE, POTASSIUM CHLORIDE, SODIUM LACTATE AND CALCIUM CHLORIDE 9 ML/HR: 600; 310; 30; 20 INJECTION, SOLUTION INTRAVENOUS at 06:11

## 2017-05-25 RX ADMIN — SODIUM CHLORIDE, POTASSIUM CHLORIDE, SODIUM LACTATE AND CALCIUM CHLORIDE: 600; 310; 30; 20 INJECTION, SOLUTION INTRAVENOUS at 09:01

## 2017-05-25 RX ADMIN — FENTANYL CITRATE 50 MCG: 50 INJECTION INTRAMUSCULAR; INTRAVENOUS at 10:10

## 2017-05-25 RX ADMIN — FENTANYL CITRATE 50 MCG: 50 INJECTION INTRAMUSCULAR; INTRAVENOUS at 09:25

## 2017-05-25 RX ADMIN — SODIUM CHLORIDE, POTASSIUM CHLORIDE, SODIUM LACTATE AND CALCIUM CHLORIDE: 600; 310; 30; 20 INJECTION, SOLUTION INTRAVENOUS at 11:31

## 2017-05-25 RX ADMIN — FENTANYL CITRATE 50 MCG: 50 INJECTION INTRAMUSCULAR; INTRAVENOUS at 08:05

## 2017-05-25 RX ADMIN — FENTANYL CITRATE 50 MCG: 50 INJECTION INTRAMUSCULAR; INTRAVENOUS at 15:15

## 2017-05-25 RX ADMIN — AMPICILLIN SODIUM AND SULBACTAM SODIUM 3 G: 2; 1 INJECTION, POWDER, FOR SOLUTION INTRAMUSCULAR; INTRAVENOUS at 20:36

## 2017-05-25 RX ADMIN — HYDROMORPHONE HYDROCHLORIDE 0.5 MG: 2 INJECTION, SOLUTION INTRAMUSCULAR; INTRAVENOUS; SUBCUTANEOUS at 11:52

## 2017-05-25 RX ADMIN — ACETAMINOPHEN 1000 MG: 10 INJECTION, SOLUTION INTRAVENOUS at 10:32

## 2017-05-25 RX ADMIN — AMPICILLIN SODIUM AND SULBACTAM SODIUM 3 G: 2; 1 INJECTION, POWDER, FOR SOLUTION INTRAMUSCULAR; INTRAVENOUS at 07:28

## 2017-05-25 RX ADMIN — ROCURONIUM BROMIDE 50 MG: 10 INJECTION INTRAVENOUS at 07:32

## 2017-05-25 RX ADMIN — NEOSTIGMINE METHYLSULFATE 4 MG: 1 INJECTION INTRAVENOUS at 11:48

## 2017-05-25 RX ADMIN — ROCURONIUM BROMIDE 10 MG: 10 INJECTION INTRAVENOUS at 08:20

## 2017-05-25 RX ADMIN — LIDOCAINE HYDROCHLORIDE 60 MG: 20 INJECTION, SOLUTION INFILTRATION; PERINEURAL at 07:32

## 2017-05-25 RX ADMIN — FENTANYL CITRATE 50 MCG: 50 INJECTION INTRAMUSCULAR; INTRAVENOUS at 08:40

## 2017-05-25 RX ADMIN — ROCURONIUM BROMIDE 10 MG: 10 INJECTION INTRAVENOUS at 09:45

## 2017-05-25 RX ADMIN — METOPROLOL TARTRATE 5 MG: 5 INJECTION INTRAVENOUS at 18:25

## 2017-05-26 ENCOUNTER — APPOINTMENT (OUTPATIENT)
Dept: GENERAL RADIOLOGY | Facility: HOSPITAL | Age: 57
End: 2017-05-26

## 2017-05-26 LAB
ANION GAP SERPL CALCULATED.3IONS-SCNC: 11.9 MMOL/L
APTT PPP: 30.9 SECONDS (ref 22.7–35.4)
BASOPHILS # BLD AUTO: 0.01 10*3/MM3 (ref 0–0.2)
BASOPHILS NFR BLD AUTO: 0.1 % (ref 0–1.5)
BUN BLD-MCNC: 13 MG/DL (ref 6–20)
BUN/CREAT SERPL: 9.8 (ref 7–25)
CALCIUM SPEC-SCNC: 8.9 MG/DL (ref 8.6–10.5)
CHLORIDE SERPL-SCNC: 101 MMOL/L (ref 98–107)
CK MB SERPL-CCNC: 1.44 NG/ML
CK SERPL-CCNC: 417 U/L (ref 20–200)
CO2 SERPL-SCNC: 23.1 MMOL/L (ref 22–29)
CREAT BLD-MCNC: 1.32 MG/DL (ref 0.76–1.27)
CRP SERPL-MCNC: 3.79 MG/DL (ref 0.01–0.5)
CYTO UR: NORMAL
DEPRECATED RDW RBC AUTO: 42.3 FL (ref 37–54)
EOSINOPHIL # BLD AUTO: 0.04 10*3/MM3 (ref 0–0.7)
EOSINOPHIL NFR BLD AUTO: 0.3 % (ref 0.3–6.2)
ERYTHROCYTE [DISTWIDTH] IN BLOOD BY AUTOMATED COUNT: 12.9 % (ref 11.5–14.5)
GFR SERPL CREATININE-BSD FRML MDRD: 57 ML/MIN/1.73
GLUCOSE BLD-MCNC: 95 MG/DL (ref 65–99)
HCT VFR BLD AUTO: 42.2 % (ref 40.4–52.2)
HGB BLD-MCNC: 15 G/DL (ref 13.7–17.6)
IMM GRANULOCYTES # BLD: 0.03 10*3/MM3 (ref 0–0.03)
IMM GRANULOCYTES NFR BLD: 0.2 % (ref 0–0.5)
INR PPP: 1.17 (ref 0.9–1.1)
LAB AP CASE REPORT: NORMAL
LAB AP INTRADEPARTMENTAL CONSULT: NORMAL
LAB AP SYNOPTIC CHECKLIST: NORMAL
LYMPHOCYTES # BLD AUTO: 1.03 10*3/MM3 (ref 0.9–4.8)
LYMPHOCYTES NFR BLD AUTO: 8.1 % (ref 19.6–45.3)
Lab: NORMAL
MAGNESIUM SERPL-MCNC: 2 MG/DL (ref 1.6–2.6)
MCH RBC QN AUTO: 32.3 PG (ref 27–32.7)
MCHC RBC AUTO-ENTMCNC: 35.5 G/DL (ref 32.6–36.4)
MCV RBC AUTO: 90.9 FL (ref 79.8–96.2)
MONOCYTES # BLD AUTO: 1.08 10*3/MM3 (ref 0.2–1.2)
MONOCYTES NFR BLD AUTO: 8.5 % (ref 5–12)
NEUTROPHILS # BLD AUTO: 10.55 10*3/MM3 (ref 1.9–8.1)
NEUTROPHILS NFR BLD AUTO: 82.8 % (ref 42.7–76)
NT-PROBNP SERPL-MCNC: 598 PG/ML (ref 5–900)
PATH REPORT.FINAL DX SPEC: NORMAL
PATH REPORT.GROSS SPEC: NORMAL
PLATELET # BLD AUTO: 170 10*3/MM3 (ref 140–500)
PMV BLD AUTO: 10.3 FL (ref 6–12)
POTASSIUM BLD-SCNC: 4 MMOL/L (ref 3.5–5.2)
PROTHROMBIN TIME: 14.4 SECONDS (ref 11.7–14.2)
RBC # BLD AUTO: 4.64 10*6/MM3 (ref 4.6–6)
SODIUM BLD-SCNC: 136 MMOL/L (ref 136–145)
TROPONIN T SERPL-MCNC: <0.01 NG/ML (ref 0–0.03)
WBC NRBC COR # BLD: 12.74 10*3/MM3 (ref 4.5–10.7)

## 2017-05-26 PROCEDURE — 86900 BLOOD TYPING SEROLOGIC ABO: CPT

## 2017-05-26 PROCEDURE — 93010 ELECTROCARDIOGRAM REPORT: CPT | Performed by: INTERNAL MEDICINE

## 2017-05-26 PROCEDURE — 86901 BLOOD TYPING SEROLOGIC RH(D): CPT

## 2017-05-26 PROCEDURE — 83880 ASSAY OF NATRIURETIC PEPTIDE: CPT | Performed by: COLON & RECTAL SURGERY

## 2017-05-26 PROCEDURE — 86141 C-REACTIVE PROTEIN HS: CPT | Performed by: COLON & RECTAL SURGERY

## 2017-05-26 PROCEDURE — 85610 PROTHROMBIN TIME: CPT | Performed by: COLON & RECTAL SURGERY

## 2017-05-26 PROCEDURE — 85730 THROMBOPLASTIN TIME PARTIAL: CPT | Performed by: COLON & RECTAL SURGERY

## 2017-05-26 PROCEDURE — 25010000002 ENOXAPARIN PER 10 MG: Performed by: COLON & RECTAL SURGERY

## 2017-05-26 PROCEDURE — 93005 ELECTROCARDIOGRAM TRACING: CPT | Performed by: INTERNAL MEDICINE

## 2017-05-26 PROCEDURE — 82550 ASSAY OF CK (CPK): CPT | Performed by: COLON & RECTAL SURGERY

## 2017-05-26 PROCEDURE — 71020 HC CHEST PA AND LATERAL: CPT

## 2017-05-26 PROCEDURE — 84484 ASSAY OF TROPONIN QUANT: CPT | Performed by: COLON & RECTAL SURGERY

## 2017-05-26 PROCEDURE — 99253 IP/OBS CNSLTJ NEW/EST LOW 45: CPT | Performed by: INTERNAL MEDICINE

## 2017-05-26 PROCEDURE — 83735 ASSAY OF MAGNESIUM: CPT | Performed by: COLON & RECTAL SURGERY

## 2017-05-26 PROCEDURE — 97161 PT EVAL LOW COMPLEX 20 MIN: CPT

## 2017-05-26 PROCEDURE — 85025 COMPLETE CBC W/AUTO DIFF WBC: CPT | Performed by: COLON & RECTAL SURGERY

## 2017-05-26 PROCEDURE — 80048 BASIC METABOLIC PNL TOTAL CA: CPT | Performed by: COLON & RECTAL SURGERY

## 2017-05-26 PROCEDURE — 84484 ASSAY OF TROPONIN QUANT: CPT | Performed by: INTERNAL MEDICINE

## 2017-05-26 PROCEDURE — 82553 CREATINE MB FRACTION: CPT | Performed by: COLON & RECTAL SURGERY

## 2017-05-26 RX ADMIN — METOPROLOL TARTRATE 5 MG: 5 INJECTION INTRAVENOUS at 13:06

## 2017-05-26 RX ADMIN — ALVIMOPAN 12 MG: 12 CAPSULE ORAL at 09:49

## 2017-05-26 RX ADMIN — ENOXAPARIN SODIUM 40 MG: 40 INJECTION SUBCUTANEOUS at 09:50

## 2017-05-26 RX ADMIN — SODIUM CHLORIDE, POTASSIUM CHLORIDE, SODIUM LACTATE AND CALCIUM CHLORIDE 100 ML/HR: 600; 310; 30; 20 INJECTION, SOLUTION INTRAVENOUS at 19:34

## 2017-05-26 RX ADMIN — AMPICILLIN SODIUM AND SULBACTAM SODIUM 3 G: 2; 1 INJECTION, POWDER, FOR SOLUTION INTRAMUSCULAR; INTRAVENOUS at 02:07

## 2017-05-26 RX ADMIN — SODIUM CHLORIDE, POTASSIUM CHLORIDE, SODIUM LACTATE AND CALCIUM CHLORIDE 120 ML/HR: 600; 310; 30; 20 INJECTION, SOLUTION INTRAVENOUS at 00:30

## 2017-05-26 RX ADMIN — METOPROLOL TARTRATE 5 MG: 5 INJECTION INTRAVENOUS at 18:35

## 2017-05-26 RX ADMIN — PANTOPRAZOLE SODIUM 40 MG: 40 INJECTION, POWDER, FOR SOLUTION INTRAVENOUS at 06:38

## 2017-05-26 RX ADMIN — METOPROLOL TARTRATE 5 MG: 5 INJECTION INTRAVENOUS at 00:27

## 2017-05-26 RX ADMIN — NITROGLYCERIN 1 INCH: 20 OINTMENT TOPICAL at 13:09

## 2017-05-26 RX ADMIN — METOPROLOL TARTRATE 5 MG: 5 INJECTION INTRAVENOUS at 05:06

## 2017-05-26 RX ADMIN — Medication: at 20:51

## 2017-05-26 RX ADMIN — ALVIMOPAN 12 MG: 12 CAPSULE ORAL at 18:35

## 2017-05-26 RX ADMIN — NITROGLYCERIN 1 INCH: 20 OINTMENT TOPICAL at 18:36

## 2017-05-27 LAB
ANION GAP SERPL CALCULATED.3IONS-SCNC: 15.7 MMOL/L
BASOPHILS # BLD AUTO: 0.02 10*3/MM3 (ref 0–0.2)
BASOPHILS NFR BLD AUTO: 0.2 % (ref 0–1.5)
BUN BLD-MCNC: 14 MG/DL (ref 6–20)
BUN/CREAT SERPL: 12.2 (ref 7–25)
CALCIUM SPEC-SCNC: 8.8 MG/DL (ref 8.6–10.5)
CHLORIDE SERPL-SCNC: 97 MMOL/L (ref 98–107)
CO2 SERPL-SCNC: 22.3 MMOL/L (ref 22–29)
CREAT BLD-MCNC: 1.15 MG/DL (ref 0.76–1.27)
DEPRECATED RDW RBC AUTO: 41.8 FL (ref 37–54)
EOSINOPHIL # BLD AUTO: 0.16 10*3/MM3 (ref 0–0.7)
EOSINOPHIL NFR BLD AUTO: 1.6 % (ref 0.3–6.2)
ERYTHROCYTE [DISTWIDTH] IN BLOOD BY AUTOMATED COUNT: 12.8 % (ref 11.5–14.5)
GFR SERPL CREATININE-BSD FRML MDRD: 66 ML/MIN/1.73
GLUCOSE BLD-MCNC: 93 MG/DL (ref 65–99)
HCT VFR BLD AUTO: 38.9 % (ref 40.4–52.2)
HGB BLD-MCNC: 13.7 G/DL (ref 13.7–17.6)
IMM GRANULOCYTES # BLD: 0.02 10*3/MM3 (ref 0–0.03)
IMM GRANULOCYTES NFR BLD: 0.2 % (ref 0–0.5)
LYMPHOCYTES # BLD AUTO: 0.81 10*3/MM3 (ref 0.9–4.8)
LYMPHOCYTES NFR BLD AUTO: 7.9 % (ref 19.6–45.3)
MAGNESIUM SERPL-MCNC: 2 MG/DL (ref 1.6–2.6)
MCH RBC QN AUTO: 31.8 PG (ref 27–32.7)
MCHC RBC AUTO-ENTMCNC: 35.2 G/DL (ref 32.6–36.4)
MCV RBC AUTO: 90.3 FL (ref 79.8–96.2)
MONOCYTES # BLD AUTO: 0.83 10*3/MM3 (ref 0.2–1.2)
MONOCYTES NFR BLD AUTO: 8.1 % (ref 5–12)
NEUTROPHILS # BLD AUTO: 8.42 10*3/MM3 (ref 1.9–8.1)
NEUTROPHILS NFR BLD AUTO: 82 % (ref 42.7–76)
PLATELET # BLD AUTO: 158 10*3/MM3 (ref 140–500)
PMV BLD AUTO: 10.4 FL (ref 6–12)
POTASSIUM BLD-SCNC: 4.1 MMOL/L (ref 3.5–5.2)
RBC # BLD AUTO: 4.31 10*6/MM3 (ref 4.6–6)
SODIUM BLD-SCNC: 135 MMOL/L (ref 136–145)
WBC NRBC COR # BLD: 10.26 10*3/MM3 (ref 4.5–10.7)

## 2017-05-27 PROCEDURE — 99232 SBSQ HOSP IP/OBS MODERATE 35: CPT | Performed by: INTERNAL MEDICINE

## 2017-05-27 PROCEDURE — 25010000002 ENOXAPARIN PER 10 MG: Performed by: COLON & RECTAL SURGERY

## 2017-05-27 PROCEDURE — 80048 BASIC METABOLIC PNL TOTAL CA: CPT | Performed by: PHYSICIAN ASSISTANT

## 2017-05-27 PROCEDURE — 83735 ASSAY OF MAGNESIUM: CPT | Performed by: PHYSICIAN ASSISTANT

## 2017-05-27 PROCEDURE — 85025 COMPLETE CBC W/AUTO DIFF WBC: CPT | Performed by: PHYSICIAN ASSISTANT

## 2017-05-27 RX ORDER — HYDROMORPHONE HCL IN 0.9% NACL 10 MG/50ML
PATIENT CONTROLLED ANALGESIA SYRINGE INTRAVENOUS CONTINUOUS
Status: DISCONTINUED | OUTPATIENT
Start: 2017-05-27 | End: 2017-05-28

## 2017-05-27 RX ADMIN — SODIUM CHLORIDE, POTASSIUM CHLORIDE, SODIUM LACTATE AND CALCIUM CHLORIDE 100 ML/HR: 600; 310; 30; 20 INJECTION, SOLUTION INTRAVENOUS at 15:10

## 2017-05-27 RX ADMIN — NITROGLYCERIN 1 INCH: 20 OINTMENT TOPICAL at 23:32

## 2017-05-27 RX ADMIN — METOPROLOL TARTRATE 5 MG: 5 INJECTION INTRAVENOUS at 00:36

## 2017-05-27 RX ADMIN — NITROGLYCERIN 1 INCH: 20 OINTMENT TOPICAL at 00:36

## 2017-05-27 RX ADMIN — ENOXAPARIN SODIUM 40 MG: 40 INJECTION SUBCUTANEOUS at 08:32

## 2017-05-27 RX ADMIN — PANTOPRAZOLE SODIUM 40 MG: 40 INJECTION, POWDER, FOR SOLUTION INTRAVENOUS at 08:32

## 2017-05-27 RX ADMIN — NITROGLYCERIN 1 INCH: 20 OINTMENT TOPICAL at 18:05

## 2017-05-27 RX ADMIN — ALVIMOPAN 12 MG: 12 CAPSULE ORAL at 08:32

## 2017-05-27 RX ADMIN — ALVIMOPAN 12 MG: 12 CAPSULE ORAL at 18:02

## 2017-05-27 RX ADMIN — METOPROLOL TARTRATE 5 MG: 5 INJECTION INTRAVENOUS at 06:43

## 2017-05-27 RX ADMIN — NITROGLYCERIN 1 INCH: 20 OINTMENT TOPICAL at 06:43

## 2017-05-27 RX ADMIN — METOPROLOL TARTRATE 5 MG: 5 INJECTION INTRAVENOUS at 18:04

## 2017-05-27 RX ADMIN — METOPROLOL TARTRATE 5 MG: 5 INJECTION INTRAVENOUS at 12:52

## 2017-05-27 RX ADMIN — SODIUM CHLORIDE, POTASSIUM CHLORIDE, SODIUM LACTATE AND CALCIUM CHLORIDE 100 ML/HR: 600; 310; 30; 20 INJECTION, SOLUTION INTRAVENOUS at 05:47

## 2017-05-27 RX ADMIN — NITROGLYCERIN 1 INCH: 20 OINTMENT TOPICAL at 12:51

## 2017-05-28 PROCEDURE — 25010000002 ENOXAPARIN PER 10 MG: Performed by: COLON & RECTAL SURGERY

## 2017-05-28 RX ORDER — HYDROMORPHONE HYDROCHLORIDE 1 MG/ML
0.2 INJECTION, SOLUTION INTRAMUSCULAR; INTRAVENOUS; SUBCUTANEOUS
Status: DISCONTINUED | OUTPATIENT
Start: 2017-05-28 | End: 2017-05-29 | Stop reason: HOSPADM

## 2017-05-28 RX ADMIN — HYDROCODONE BITARTRATE AND ACETAMINOPHEN 15 ML: 7.5; 325 SOLUTION ORAL at 20:42

## 2017-05-28 RX ADMIN — ALVIMOPAN 12 MG: 12 CAPSULE ORAL at 09:38

## 2017-05-28 RX ADMIN — ALVIMOPAN 12 MG: 12 CAPSULE ORAL at 17:58

## 2017-05-28 RX ADMIN — HYDROCODONE BITARTRATE AND ACETAMINOPHEN 15 ML: 7.5; 325 SOLUTION ORAL at 16:03

## 2017-05-28 RX ADMIN — NITROGLYCERIN 1 INCH: 20 OINTMENT TOPICAL at 12:09

## 2017-05-28 RX ADMIN — NITROGLYCERIN 1 INCH: 20 OINTMENT TOPICAL at 17:58

## 2017-05-28 RX ADMIN — METOPROLOL TARTRATE 5 MG: 5 INJECTION INTRAVENOUS at 12:15

## 2017-05-28 RX ADMIN — SODIUM CHLORIDE, POTASSIUM CHLORIDE, SODIUM LACTATE AND CALCIUM CHLORIDE 100 ML/HR: 600; 310; 30; 20 INJECTION, SOLUTION INTRAVENOUS at 04:43

## 2017-05-28 RX ADMIN — ENOXAPARIN SODIUM 40 MG: 40 INJECTION SUBCUTANEOUS at 09:39

## 2017-05-28 RX ADMIN — SODIUM CHLORIDE, POTASSIUM CHLORIDE, SODIUM LACTATE AND CALCIUM CHLORIDE 30 ML/HR: 600; 310; 30; 20 INJECTION, SOLUTION INTRAVENOUS at 18:03

## 2017-05-28 RX ADMIN — NITROGLYCERIN 1 INCH: 20 OINTMENT TOPICAL at 23:59

## 2017-05-28 RX ADMIN — METOPROLOL TARTRATE 5 MG: 5 INJECTION INTRAVENOUS at 23:59

## 2017-05-28 RX ADMIN — NITROGLYCERIN 1 INCH: 20 OINTMENT TOPICAL at 06:01

## 2017-05-28 RX ADMIN — HYDROCODONE BITARTRATE AND ACETAMINOPHEN 15 ML: 7.5; 325 SOLUTION ORAL at 12:12

## 2017-05-28 RX ADMIN — METOPROLOL TARTRATE 5 MG: 5 INJECTION INTRAVENOUS at 18:01

## 2017-05-28 RX ADMIN — Medication: at 05:58

## 2017-05-28 RX ADMIN — PANTOPRAZOLE SODIUM 40 MG: 40 INJECTION, POWDER, FOR SOLUTION INTRAVENOUS at 06:02

## 2017-05-29 VITALS
HEART RATE: 72 BPM | WEIGHT: 190.6 LBS | RESPIRATION RATE: 20 BRPM | OXYGEN SATURATION: 99 % | BODY MASS INDEX: 27.29 KG/M2 | SYSTOLIC BLOOD PRESSURE: 117 MMHG | HEIGHT: 70 IN | TEMPERATURE: 98.6 F | DIASTOLIC BLOOD PRESSURE: 76 MMHG

## 2017-05-29 LAB
ABO + RH BLD: NORMAL
ABO + RH BLD: NORMAL
ANION GAP SERPL CALCULATED.3IONS-SCNC: 13.2 MMOL/L
BASOPHILS # BLD AUTO: 0.02 10*3/MM3 (ref 0–0.2)
BASOPHILS NFR BLD AUTO: 0.3 % (ref 0–1.5)
BH BB BLOOD EXPIRATION DATE: NORMAL
BH BB BLOOD EXPIRATION DATE: NORMAL
BH BB BLOOD TYPE BARCODE: 5100
BH BB BLOOD TYPE BARCODE: 5100
BH BB DISPENSE STATUS: NORMAL
BH BB DISPENSE STATUS: NORMAL
BH BB PRODUCT CODE: NORMAL
BH BB PRODUCT CODE: NORMAL
BH BB UNIT NUMBER: NORMAL
BH BB UNIT NUMBER: NORMAL
BUN BLD-MCNC: 15 MG/DL (ref 6–20)
BUN/CREAT SERPL: 11.5 (ref 7–25)
CALCIUM SPEC-SCNC: 9.3 MG/DL (ref 8.6–10.5)
CHLORIDE SERPL-SCNC: 98 MMOL/L (ref 98–107)
CO2 SERPL-SCNC: 25.8 MMOL/L (ref 22–29)
CREAT BLD-MCNC: 1.3 MG/DL (ref 0.76–1.27)
CROSSMATCH INTERPRETATION: NORMAL
CROSSMATCH INTERPRETATION: NORMAL
DEPRECATED RDW RBC AUTO: 40.9 FL (ref 37–54)
EOSINOPHIL # BLD AUTO: 0.48 10*3/MM3 (ref 0–0.7)
EOSINOPHIL NFR BLD AUTO: 6.8 % (ref 0.3–6.2)
ERYTHROCYTE [DISTWIDTH] IN BLOOD BY AUTOMATED COUNT: 12.7 % (ref 11.5–14.5)
GFR SERPL CREATININE-BSD FRML MDRD: 58 ML/MIN/1.73
GLUCOSE BLD-MCNC: 112 MG/DL (ref 65–99)
HCT VFR BLD AUTO: 40.1 % (ref 40.4–52.2)
HGB BLD-MCNC: 14.4 G/DL (ref 13.7–17.6)
IMM GRANULOCYTES # BLD: 0.03 10*3/MM3 (ref 0–0.03)
IMM GRANULOCYTES NFR BLD: 0.4 % (ref 0–0.5)
LYMPHOCYTES # BLD AUTO: 0.88 10*3/MM3 (ref 0.9–4.8)
LYMPHOCYTES NFR BLD AUTO: 12.5 % (ref 19.6–45.3)
MAGNESIUM SERPL-MCNC: 2.2 MG/DL (ref 1.6–2.6)
MCH RBC QN AUTO: 31.7 PG (ref 27–32.7)
MCHC RBC AUTO-ENTMCNC: 35.9 G/DL (ref 32.6–36.4)
MCV RBC AUTO: 88.3 FL (ref 79.8–96.2)
MONOCYTES # BLD AUTO: 0.66 10*3/MM3 (ref 0.2–1.2)
MONOCYTES NFR BLD AUTO: 9.3 % (ref 5–12)
NEUTROPHILS # BLD AUTO: 4.99 10*3/MM3 (ref 1.9–8.1)
NEUTROPHILS NFR BLD AUTO: 70.7 % (ref 42.7–76)
PHOSPHATE SERPL-MCNC: 2.7 MG/DL (ref 2.5–4.5)
PLATELET # BLD AUTO: 167 10*3/MM3 (ref 140–500)
PMV BLD AUTO: 10.1 FL (ref 6–12)
POTASSIUM BLD-SCNC: 3.8 MMOL/L (ref 3.5–5.2)
RBC # BLD AUTO: 4.54 10*6/MM3 (ref 4.6–6)
SODIUM BLD-SCNC: 137 MMOL/L (ref 136–145)
UNIT  ABO: NORMAL
UNIT  ABO: NORMAL
UNIT  RH: NORMAL
UNIT  RH: NORMAL
WBC NRBC COR # BLD: 7.06 10*3/MM3 (ref 4.5–10.7)

## 2017-05-29 PROCEDURE — 83735 ASSAY OF MAGNESIUM: CPT | Performed by: COLON & RECTAL SURGERY

## 2017-05-29 PROCEDURE — 80048 BASIC METABOLIC PNL TOTAL CA: CPT | Performed by: COLON & RECTAL SURGERY

## 2017-05-29 PROCEDURE — 85025 COMPLETE CBC W/AUTO DIFF WBC: CPT | Performed by: COLON & RECTAL SURGERY

## 2017-05-29 PROCEDURE — 84100 ASSAY OF PHOSPHORUS: CPT | Performed by: COLON & RECTAL SURGERY

## 2017-05-29 PROCEDURE — 25010000002 ENOXAPARIN PER 10 MG: Performed by: COLON & RECTAL SURGERY

## 2017-05-29 RX ORDER — OXYCODONE HYDROCHLORIDE AND ACETAMINOPHEN 5; 325 MG/1; MG/1
TABLET ORAL
Qty: 64 TABLET | Refills: 0 | Status: SHIPPED | OUTPATIENT
Start: 2017-05-29 | End: 2018-01-09 | Stop reason: ALTCHOICE

## 2017-05-29 RX ORDER — METOPROLOL SUCCINATE 50 MG/1
50 TABLET, EXTENDED RELEASE ORAL NIGHTLY
Status: DISCONTINUED | OUTPATIENT
Start: 2017-05-29 | End: 2017-05-29 | Stop reason: HOSPADM

## 2017-05-29 RX ORDER — ISOSORBIDE MONONITRATE 30 MG/1
30 TABLET, EXTENDED RELEASE ORAL NIGHTLY
Status: DISCONTINUED | OUTPATIENT
Start: 2017-05-29 | End: 2017-05-29 | Stop reason: HOSPADM

## 2017-05-29 RX ADMIN — ENOXAPARIN SODIUM 40 MG: 40 INJECTION SUBCUTANEOUS at 08:13

## 2017-05-29 RX ADMIN — NITROGLYCERIN 1 INCH: 20 OINTMENT TOPICAL at 06:21

## 2017-05-29 RX ADMIN — HYDROCODONE BITARTRATE AND ACETAMINOPHEN 15 ML: 7.5; 325 SOLUTION ORAL at 06:21

## 2017-05-29 RX ADMIN — PANTOPRAZOLE SODIUM 40 MG: 40 INJECTION, POWDER, FOR SOLUTION INTRAVENOUS at 08:13

## 2017-05-29 RX ADMIN — METOPROLOL TARTRATE 5 MG: 5 INJECTION INTRAVENOUS at 06:21

## 2017-05-29 RX ADMIN — ALVIMOPAN 12 MG: 12 CAPSULE ORAL at 08:13

## 2017-06-16 ENCOUNTER — OFFICE VISIT (OUTPATIENT)
Dept: SURGERY | Facility: CLINIC | Age: 57
End: 2017-06-16

## 2017-06-16 VITALS
TEMPERATURE: 98 F | HEART RATE: 60 BPM | HEIGHT: 70 IN | SYSTOLIC BLOOD PRESSURE: 128 MMHG | BODY MASS INDEX: 27.2 KG/M2 | WEIGHT: 190 LBS | RESPIRATION RATE: 20 BRPM | OXYGEN SATURATION: 98 % | DIASTOLIC BLOOD PRESSURE: 74 MMHG

## 2017-06-16 DIAGNOSIS — D12.6 SERRATED ADENOMA OF COLON: Primary | ICD-10-CM

## 2017-06-16 PROCEDURE — 99024 POSTOP FOLLOW-UP VISIT: CPT | Performed by: COLON & RECTAL SURGERY

## 2017-06-16 RX ORDER — NEOMYCIN SULFATE 500 MG/1
TABLET ORAL
COMMUNITY
Start: 2017-06-02 | End: 2018-01-09 | Stop reason: ALTCHOICE

## 2017-06-16 NOTE — PROGRESS NOTES
"Kev Mae is a 56 y.o. male in for follow up of Serrated adenoma of colon   s/p laparoscopic sigmoid colon resection, laparoscopic left nephrectomy 5/25/2017    Pt states energy is still low    Appetite is good    Having 1-2 BMs per day  No blood    Stools firm    Not taking any stool softener    Taking 3 tabs rx pain meds per day  Not taking any otc pain meds    No n/v    No f/c    Has appt with Dr. Thompson later today  Has appt with Dr. Charles Monday    /74 (BP Location: Left arm, Patient Position: Sitting, Cuff Size: Adult)  Pulse 60  Temp 98 °F (36.7 °C)  Resp 20  Ht 70\" (177.8 cm)  Wt 190 lb (86.2 kg)  SpO2 98%  BMI 27.26 kg/m2  Body mass index is 27.26 kg/(m^2).      PE:  Physical Exam   Constitutional: He appears well-developed. No distress.   HENT:   Head: Normocephalic and atraumatic.   Abdominal:   -s/nt/nd  -midline vertical incision healing well: c/d/i with no edema or erythema  -no hernia palpated   Musculoskeletal: Normal range of motion.   Neurological: He is alert.   Psychiatric: Thought content normal.         Assessment:   1. Serrated adenoma of colon     s/p laparoscopic sigmoid colon resection, laparoscopic left nephrectomy 5/25/2017    Plan:    -can slowly return to normal activities.  Can increase lifting by 5 lb per week  -transition to otc pain medication  -can drive if not taking rx pain medication  -call or come in if any questions or concerns    RTC 4 weeks      Scribed for Claudia Tang MD by Rosario Keller PA-C 6/16/2017     This patient was evaluated by me, recommendations made, documentation reviewed, edited, and revised by me, Claudia Tang MD          EMR Dragon/Transcription disclaimer:   Much of this encounter note is an electronic transcription/translation of spoken language to printed text. The electronic translation of spoken language may permit erroneous, or at times, nonsensical words or phrases to be inadvertently transcribed; Although I have reviewed the " note for such errors, some may still exist.

## 2017-06-22 ENCOUNTER — TELEPHONE (OUTPATIENT)
Dept: SURGERY | Facility: CLINIC | Age: 57
End: 2017-06-22

## 2017-06-22 NOTE — TELEPHONE ENCOUNTER
DR THAT SAW PT IN ER YESTERDAY WANTED TO LET US KNOW THAT HE WAS SENDING RECORDS AND PT TESTED (+) FOR H.PYLORI. HE ALSO SAID THAT DESPITE BEING 3 WKS POST-OP, PT HAD CRAWLED UNDER HIS HOUSE TO FIX PLUMBING. JUST WANTED TO MAKE US AWARE.

## 2017-06-30 ENCOUNTER — OFFICE VISIT (OUTPATIENT)
Dept: SURGERY | Facility: CLINIC | Age: 57
End: 2017-06-30

## 2017-06-30 VITALS
SYSTOLIC BLOOD PRESSURE: 112 MMHG | BODY MASS INDEX: 26.65 KG/M2 | HEART RATE: 78 BPM | OXYGEN SATURATION: 97 % | WEIGHT: 185.7 LBS | TEMPERATURE: 97.8 F | DIASTOLIC BLOOD PRESSURE: 78 MMHG

## 2017-06-30 DIAGNOSIS — D12.6 SERRATED ADENOMA OF COLON: Primary | ICD-10-CM

## 2017-06-30 PROCEDURE — 99024 POSTOP FOLLOW-UP VISIT: CPT | Performed by: COLON & RECTAL SURGERY

## 2017-06-30 RX ORDER — HYDROCODONE BITARTRATE AND ACETAMINOPHEN 7.5; 325 MG/1; MG/1
TABLET ORAL
Refills: 0 | COMMUNITY
Start: 2017-06-22 | End: 2018-01-09 | Stop reason: ALTCHOICE

## 2017-06-30 RX ORDER — CLARITHROMYCIN 500 MG/1
TABLET, COATED ORAL
COMMUNITY
Start: 2017-06-22 | End: 2018-01-09 | Stop reason: ALTCHOICE

## 2017-06-30 RX ORDER — AMOXICILLIN 500 MG/1
CAPSULE ORAL
COMMUNITY
Start: 2017-06-22 | End: 2018-01-09 | Stop reason: ALTCHOICE

## 2017-07-21 ENCOUNTER — OFFICE VISIT (OUTPATIENT)
Dept: SURGERY | Facility: CLINIC | Age: 57
End: 2017-07-21

## 2017-07-21 VITALS
SYSTOLIC BLOOD PRESSURE: 122 MMHG | OXYGEN SATURATION: 97 % | HEART RATE: 65 BPM | WEIGHT: 193.4 LBS | TEMPERATURE: 97.8 F | DIASTOLIC BLOOD PRESSURE: 74 MMHG | BODY MASS INDEX: 27.69 KG/M2 | HEIGHT: 70 IN

## 2017-07-21 DIAGNOSIS — D12.6 SERRATED ADENOMA OF COLON: Primary | ICD-10-CM

## 2017-07-21 PROCEDURE — 99024 POSTOP FOLLOW-UP VISIT: CPT | Performed by: COLON & RECTAL SURGERY

## 2017-07-21 NOTE — PROGRESS NOTES
"Kev Mae is a 56 y.o. male in for follow up of Serrated adenoma of colon    Put brakes on the car without issues   Energy good  Pain occas on Left side    bm daily  No Rb      /74 (BP Location: Right arm, Patient Position: Sitting)  Pulse 65  Temp 97.8 °F (36.6 °C)  Ht 70\" (177.8 cm)  Wt 193 lb 6.4 oz (87.7 kg)  SpO2 97%  BMI 27.75 kg/m2  Body mass index is 27.75 kg/(m^2).      PE:  Physical Exam   Constitutional: He appears well-developed. No distress.   HENT:   Head: Normocephalic and atraumatic.   Abdominal: Soft. He exhibits no distension.   Musculoskeletal: Normal range of motion.   Neurological: He is alert.   Psychiatric: Thought content normal.         Assessment:   1. Serrated adenoma of colon       S/p sigmoid resection    Plan: return to work  Recommend Cy - 1 yr with Dr. Taylor  F/u prn    EMR Dragon/Transcription disclaimer:   Much of this encounter note is an electronic transcription/translation of spoken language to printed text. The electronic translation of spoken language may permit erroneous, or at times, nonsensical words or phrases to be inadvertently transcribed; Although I have reviewed the note for such errors, some may still exist.     "

## 2017-07-24 ENCOUNTER — TELEPHONE (OUTPATIENT)
Dept: GASTROENTEROLOGY | Facility: CLINIC | Age: 57
End: 2017-07-24

## 2017-07-24 NOTE — TELEPHONE ENCOUNTER
----- Message from Concepción Taylor MD sent at 7/24/2017  1:25 PM EDT -----  Can we put him in 1 year colonoscopy recall, thanks  ----- Message -----     From: Claudia Tang MD     Sent: 7/24/2017   8:49 AM       To: Concepción Taylor MD

## 2018-01-09 ENCOUNTER — OFFICE VISIT (OUTPATIENT)
Dept: CARDIOLOGY | Facility: CLINIC | Age: 58
End: 2018-01-09

## 2018-01-09 VITALS
HEART RATE: 69 BPM | SYSTOLIC BLOOD PRESSURE: 120 MMHG | WEIGHT: 202 LBS | BODY MASS INDEX: 28.92 KG/M2 | DIASTOLIC BLOOD PRESSURE: 82 MMHG | HEIGHT: 70 IN

## 2018-01-09 DIAGNOSIS — I10 ESSENTIAL HYPERTENSION: ICD-10-CM

## 2018-01-09 DIAGNOSIS — I25.110 CORONARY ARTERY DISEASE INVOLVING NATIVE CORONARY ARTERY OF NATIVE HEART WITH UNSTABLE ANGINA PECTORIS (HCC): Primary | ICD-10-CM

## 2018-01-09 PROCEDURE — 99213 OFFICE O/P EST LOW 20 MIN: CPT | Performed by: INTERNAL MEDICINE

## 2018-01-09 PROCEDURE — 93000 ELECTROCARDIOGRAM COMPLETE: CPT | Performed by: INTERNAL MEDICINE

## 2018-01-09 RX ORDER — ISOSORBIDE MONONITRATE 30 MG/1
30 TABLET, EXTENDED RELEASE ORAL NIGHTLY
Qty: 90 TABLET | Refills: 3 | Status: SHIPPED | OUTPATIENT
Start: 2018-01-09 | End: 2018-05-09 | Stop reason: SDUPTHER

## 2018-01-09 RX ORDER — METOPROLOL SUCCINATE 50 MG/1
50 TABLET, EXTENDED RELEASE ORAL NIGHTLY
Qty: 90 TABLET | Refills: 3 | Status: SHIPPED | OUTPATIENT
Start: 2018-01-09 | End: 2018-05-09 | Stop reason: SDUPTHER

## 2018-01-09 NOTE — PROGRESS NOTES
Date of Office Visit: 2018  Encounter Provider: Hina Charles MD  Place of Service: [facility] CARDIOLOGY  Patient Name: Kev Mae  :1960    Chief complaint  Follow up of coronary artery disease.       History of Present Illness  The patient is a 57-year-old gentleman with history of coronary artery disease with prior coronary artery bypass grafting in . He was seen in 2017 with chest discomfort. He was found to have a renal mass by CT imaging. He also underwent cardiac catheterization which showed an occluded LIMA graft to the LAD, mild-to-moderate disease of the native LAD with a vein graft to the right coronary artery disease and obtuse marginal branch that was patent. He was treated medically. He subsequently underwent laparoscopic radical nephrectomy with sigmoid resection of the mass. 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 He now presents for followup.    In the interim he denies any chest pain, shortness of breath, palpitations, syncope, near-syncope. He is walking 7000 steps a day and feels relatively well.    Past Medical History:   Diagnosis Date   • Bloody stool    • CAD (coronary artery disease)     CABG IN THE PAST   • Carotid artery plaque    • Colon polyps    • Coronary artery disease involving native coronary artery of native heart with unstable angina pectoris    • Diaphoresis    • Diplopia    • Essential hypertension    • H. pylori infection 2017    [facility] in [place]   • Hyperlipidemia    • Hypertension    • Nausea    • Past myocardial infarction     INFERIOR   • Precordial pain    • Renal cancer 2017    LEFT      DR. IYER   • SOB (shortness of breath)      Past Surgical History:   Procedure Laterality Date   • AMPUTATION DIGIT Right     X2 RING FINGER   • CARDIAC CATHETERIZATION N/A 2/10/2017    Procedure: Left Heart Cath;  Surgeon: Leland Carpio MD;  Location: [facility] INVASIVE  LOCATION;  Service:    • CARDIAC CATHETERIZATION N/A 2009       • CARDIAC CATHETERIZATION N/A 08/26/2015       • CARDIAC CATHETERIZATION N/A 06/28/2012       • COLON RESECTION N/A 5/25/2017    Procedure: LAPAROSCOPIC SIGMOID COLON RESECTION, MOBILIZATION OF SPLENIC FLEXURE;  Surgeon: Claudia Tang MD;  Location: [facility] MAIN OR;  Service:    • COLONOSCOPY N/A 4/3/2017    Procedure: COLONOSCOPY TO CECUM WITH COLD POLYPECTOMIES, COLD BIOPSIES;  Surgeon: Concepción Taylor MD;  Location: [facility] ENDOSCOPY;  Service:    • CORONARY ARTERY BYPASS GRAFT N/A 2009    3 VESSEL   • HERNIA REPAIR     • NEPHRECTOMY Left 5/25/2017    Procedure: LAPAROSCOPIC LEFT NEPHRECTOMY ;  Surgeon: Dima Thompson MD;  Location: [facility] MAIN OR;  Service:    • ROTATOR CUFF REPAIR Left 2015   • SIGMOIDOSCOPY N/A 4/21/2017    Procedure: SIGMOIDOSCOPY FLEXIBLE to Descending Colon, injected with 7 ML Spot Ink;  Surgeon: Claudia Tang MD;  Location: [facility] ENDOSCOPY;  Service:    • VASECTOMY N/A     HAD 2 SURGERIES     Outpatient Medications Prior to Visit   Medication Sig Dispense Refill   • aspirin 81 MG tablet Take 81 mg by mouth Daily. TO STOP 5 DAYS BEFORE SURGERY     • folic acid (FOLVITE) 400 MCG tablet Take 400 mcg by mouth Every Night.     • MULTIPLE VITAMIN PO Take 1 tablet by mouth Every Evening. TO STOP 5 DAYS BEFORE SURGERY     • simvastatin (ZOCOR) 20 MG tablet Take 20 mg by mouth Every Night.     • amoxicillin (AMOXIL) 500 MG capsule      • clarithromycin (BIAXIN) 500 MG tablet      • HYDROcodone-acetaminophen (NORCO) 7.5-325 MG per tablet TAKE 1 TABLET BY MOUTH EVERY 6 HOURS AS NEEDED FOR PAIN  0   • isosorbide mononitrate (IMDUR) 30 MG 24 hr tablet Take 30 mg by mouth Every Night.     • metoprolol succinate XL (TOPROL XL) 50 MG 24 hr tablet Take 50 mg by mouth Every Night.     • neomycin (MYCIFRADIN) 500 MG tablet      • oxyCODONE-acetaminophen (ROXICET) 5-325 MG per tablet 1-2 tabs po Q 6 hours prn pain 64  tablet 0     No facility-administered medications prior to visit.      Allergies as of 01/09/2018   • (No Known Allergies)     Social History     Social History   • Marital status:      Spouse name: N/A   • Number of children: N/A   • Years of education: N/A     Occupational History   • Not on file.     Social History Main Topics   • Smoking status: Former Smoker     Packs/day: 2.00     Years: 32.00     Types: Cigarettes     Quit date: 5/25/2009   • Smokeless tobacco: Never Used   • Alcohol use Yes      Comment: social drinker   • Drug use: No   • Sexual activity: Defer     Other Topics Concern   • Not on file     Social History Narrative     Family History   Problem Relation Age of Onset   • Heart attack Mother    • Leukemia Mother    • Heart attack Brother    • Coronary artery disease Brother    • Heart disease Brother    • Deep vein thrombosis Cousin      with embolic death   • Coronary artery disease Father    • Heart disease Father      Review of Systems   Constitution: Negative for fever, malaise/fatigue, weight gain and weight loss.   HENT: Negative for ear pain, hearing loss, nosebleeds and sore throat.    Eyes: Negative for double vision, pain, vision loss in left eye and vision loss in right eye.   Cardiovascular:        See history of present illness.   Respiratory: Negative for cough, shortness of breath, sleep disturbances due to breathing, snoring and wheezing.    Endocrine: Negative for cold intolerance, heat intolerance and polyuria.   Skin: Negative for itching, poor wound healing and rash.   Musculoskeletal: Negative for joint pain, joint swelling and myalgias.   Gastrointestinal: Negative for abdominal pain, diarrhea, hematochezia, nausea and vomiting.   Genitourinary: Negative for hematuria and hesitancy.   Neurological: Negative for numbness, paresthesias and seizures.   Psychiatric/Behavioral: Negative for depression. The patient is not nervous/anxious.      Objective:     Vitals:     "01/09/18 0931   BP: 120/82   Pulse: 69   Weight: 91.6 kg (202 lb)   Height: 177.8 cm (70\")     Body mass index is 28.98 kg/(m^2).    Physical Exam   Constitutional: He is oriented to person, place, and time. He appears well-developed and well-nourished.   HENT:   Head: Normocephalic.   Nose: Nose normal.   Mouth/Throat: Oropharynx is clear and moist.   Eyes: Conjunctivae and EOM are normal. Pupils are equal, round, and reactive to light. Right eye exhibits no discharge. No scleral icterus.   Neck: Normal range of motion. Neck supple. No JVD present. No thyromegaly present.   Cardiovascular: Normal rate, regular rhythm, normal heart sounds and intact distal pulses.  Exam reveals no gallop and no friction rub.    No murmur heard.  Pulses:       Carotid pulses are 2+ on the right side, and 2+ on the left side.       Radial pulses are 2+ on the right side, and 2+ on the left side.        Femoral pulses are 2+ on the right side, and 2+ on the left side.       Popliteal pulses are 2+ on the right side, and 2+ on the left side.        Dorsalis pedis pulses are 2+ on the right side, and 2+ on the left side.        Posterior tibial pulses are 2+ on the right side, and 2+ on the left side.   Pulmonary/Chest: Effort normal and breath sounds normal. No respiratory distress. He has no wheezes. He has no rales.   Abdominal: Soft. Bowel sounds are normal. He exhibits no distension. There is no hepatosplenomegaly. There is no tenderness. There is no rebound.   Musculoskeletal: Normal range of motion. He exhibits no edema or tenderness.   Neurological: He is alert and oriented to person, place, and time.   Skin: Skin is warm and dry. No rash noted. No erythema.   Psychiatric: He has a normal mood and affect. His behavior is normal. Judgment and thought content normal.   Vitals reviewed.    Lab Review:     ECG 12 Lead  Date/Time: 1/9/2018 9:53 PM  Performed by: MAGGIE CALL  Authorized by: MAGGIE CALL   Comparison: compared with " previous ECG   Similar to previous ECG  Rhythm: sinus rhythm  Clinical impression: normal ECG          Assessment:       Diagnosis Plan   1. Coronary artery disease involving native coronary artery of native heart with unstable angina pectoris  ECG 12 Lead   2. Essential hypertension  ECG 12 Lead     Plan:       1.  Coronary artery disease with history of coronary artery bypass grafting.  Clinically stable with no anginal symptoms. Will increase his exercise regimen further and followup in 1 year.   2.  Hypertension.  Blood pressure is controlled. Followup in 6 months.  3.  Dyslipidemia, followed by Dr. Jones.  4.  History of renal cell carcinoma, status post resection.  5.  Colonic polyps.  6.  History of borderline elevated glucose.      Coronary Artery Disease  Assessment  • The patient has no angina    Plan  • Lifestyle modifications discussed include adhering to a heart healthy diet, regular exercise and regular monitoring of cholesterol and blood pressure    Subjective - Objective  • There is a history of previous coronary artery bypass graft  • Current antiplatelet therapy includes aspirin 81 mg         Kev Mae   [place] Medication Instructions POOL:    Printed on:01/11/18 4341   Medication Information                      aspirin 81 MG tablet  Take 81 mg by mouth Daily. TO STOP 5 DAYS BEFORE SURGERY             folic acid (FOLVITE) 400 MCG tablet  Take 400 mcg by mouth Every Night.             isosorbide mononitrate (IMDUR) 30 MG 24 hr tablet  Take 1 tablet by mouth Every Night.             metoprolol succinate XL (TOPROL XL) 50 MG 24 hr tablet  Take 1 tablet by mouth Every Night.             MULTIPLE VITAMIN PO  Take 1 tablet by mouth Every Evening. TO STOP 5 DAYS BEFORE SURGERY             simvastatin (ZOCOR) 20 MG tablet  Take 20 mg by mouth Every Night.               Dictated utilizing Dragon dictation

## 2018-01-11 PROBLEM — R07.9 CHEST PAIN: Status: RESOLVED | Noted: 2017-02-09 | Resolved: 2018-01-11

## 2018-05-09 RX ORDER — ISOSORBIDE MONONITRATE 30 MG/1
30 TABLET, EXTENDED RELEASE ORAL NIGHTLY
Qty: 14 TABLET | Refills: 0 | Status: SHIPPED | OUTPATIENT
Start: 2018-05-09 | End: 2021-07-07 | Stop reason: HOSPADM

## 2018-05-09 RX ORDER — METOPROLOL SUCCINATE 50 MG/1
50 TABLET, EXTENDED RELEASE ORAL NIGHTLY
Qty: 14 TABLET | Refills: 0 | Status: SHIPPED | OUTPATIENT
Start: 2018-05-09 | End: 2021-11-03 | Stop reason: SDUPTHER

## 2018-07-09 ENCOUNTER — OFFICE VISIT (OUTPATIENT)
Dept: CARDIOLOGY | Facility: CLINIC | Age: 58
End: 2018-07-09

## 2018-07-09 VITALS
HEART RATE: 57 BPM | HEIGHT: 70 IN | DIASTOLIC BLOOD PRESSURE: 80 MMHG | SYSTOLIC BLOOD PRESSURE: 120 MMHG | BODY MASS INDEX: 31.47 KG/M2 | WEIGHT: 219.8 LBS

## 2018-07-09 DIAGNOSIS — I25.10 CORONARY ARTERY DISEASE INVOLVING NATIVE CORONARY ARTERY OF NATIVE HEART WITHOUT ANGINA PECTORIS: Primary | ICD-10-CM

## 2018-07-09 DIAGNOSIS — I10 ESSENTIAL HYPERTENSION: ICD-10-CM

## 2018-07-09 PROCEDURE — 99214 OFFICE O/P EST MOD 30 MIN: CPT | Performed by: INTERNAL MEDICINE

## 2018-07-09 PROCEDURE — 93000 ELECTROCARDIOGRAM COMPLETE: CPT | Performed by: INTERNAL MEDICINE

## 2018-07-09 NOTE — PROGRESS NOTES
Date of Office Visit: 2018  Encounter Provider: Hina Charles MD  Place of Service: [facility] CARDIOLOGY  Patient Name: Kev Mae  :1960    Chief complaint  Follow up of coronary artery disease.     History of Present Illness  The patient is a 57-year-old gentleman with history of coronary artery disease with prior coronary artery bypass grafting in . He was seen in 2017 with chest discomfort. He was found to have a renal mass by CT imaging. He also underwent cardiac catheterization which showed an occluded LIMA graft to the LAD, mild-to-moderate disease of the native LAD with a vein graft to the right coronary artery disease and obtuse marginal branch that was patent. He was treated medically. He subsequently underwent laparoscopic radical nephrectomy with sigmoid resection of the mass. 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 He now presents for followup.    Since the last visit he is walking 15K steps a day. He denies chest pain, shortness of breath, palpitations, edema or syncope.Followup CT abdomen has been stable per patient.    Past Medical History:   Diagnosis Date   • Bloody stool    • CAD (coronary artery disease)     CABG IN THE PAST   • Carotid artery plaque    • Colon polyps    • Coronary artery disease involving native coronary artery of native heart with unstable angina pectoris (CMS/HCC)    • Diaphoresis    • Diplopia    • Essential hypertension    • H. pylori infection 2017    [facility] in [place]   • Hyperlipidemia    • Hypertension    • Nausea    • Past myocardial infarction     INFERIOR   • Precordial pain    • Renal cancer (CMS/HCC) 2017    LEFT      DR. IYER   • SOB (shortness of breath)      Past Surgical History:   Procedure Laterality Date   • AMPUTATION DIGIT Right     X2 RING FINGER   • CARDIAC CATHETERIZATION N/A 2/10/2017    Procedure: Left Heart Cath;  Surgeon: Leland Carpio MD;   Location: [facility] CATH INVASIVE LOCATION;  Service:    • CARDIAC CATHETERIZATION N/A 2009       • CARDIAC CATHETERIZATION N/A 08/26/2015       • CARDIAC CATHETERIZATION N/A 06/28/2012       • COLON RESECTION N/A 5/25/2017    Procedure: LAPAROSCOPIC SIGMOID COLON RESECTION, MOBILIZATION OF SPLENIC FLEXURE;  Surgeon: Claudia Tang MD;  Location: [facility] MAIN OR;  Service:    • COLONOSCOPY N/A 4/3/2017    Procedure: COLONOSCOPY TO CECUM WITH COLD POLYPECTOMIES, COLD BIOPSIES;  Surgeon: Concepción Taylor MD;  Location: [facility] ENDOSCOPY;  Service:    • CORONARY ARTERY BYPASS GRAFT N/A 2009    3 VESSEL   • HERNIA REPAIR     • NEPHRECTOMY Left 5/25/2017    Procedure: LAPAROSCOPIC LEFT NEPHRECTOMY ;  Surgeon: Dima Thompson MD;  Location: [facility] MAIN OR;  Service:    • ROTATOR CUFF REPAIR Left 2015   • SIGMOIDOSCOPY N/A 4/21/2017    Procedure: SIGMOIDOSCOPY FLEXIBLE to Descending Colon, injected with 7 ML Spot Ink;  Surgeon: Claudia Tang MD;  Location: [facility] ENDOSCOPY;  Service:    • VASECTOMY N/A     HAD 2 SURGERIES     Outpatient Medications Prior to Visit   Medication Sig Dispense Refill   • aspirin 81 MG tablet Take 81 mg by mouth Daily. TO STOP 5 DAYS BEFORE SURGERY     • folic acid (FOLVITE) 400 MCG tablet Take 400 mcg by mouth Every Night.     • isosorbide mononitrate (IMDUR) 30 MG 24 hr tablet Take 1 tablet by mouth Every Night. 14 tablet 0   • metoprolol succinate XL (TOPROL XL) 50 MG 24 hr tablet Take 1 tablet by mouth Every Night. 14 tablet 0   • MULTIPLE VITAMIN PO Take 1 tablet by mouth Every Evening. TO STOP 5 DAYS BEFORE SURGERY     • simvastatin (ZOCOR) 20 MG tablet Take 20 mg by mouth Every Night.       No facility-administered medications prior to visit.        Allergies as of 07/09/2018   • (No Known Allergies)     Social History     Social History   • Marital status:      Spouse name: N/A   • Number of children: N/A   • Years of education: N/A     Occupational History  "  • Not on file.     Social History Main Topics   • Smoking status: Former Smoker     Packs/day: 2.00     Years: 32.00     Types: Cigarettes     Quit date: 5/25/2009   • Smokeless tobacco: Never Used      Comment: Daily caffeine use   • Alcohol use Yes      Comment: social drinker   • Drug use: No   • Sexual activity: Defer     Other Topics Concern   • Not on file     Social History Narrative   • No narrative on file     Family History   Problem Relation Age of Onset   • Heart attack Mother    • Leukemia Mother    • Heart attack Brother    • Coronary artery disease Brother    • Heart disease Brother    • Deep vein thrombosis Cousin         with embolic death   • Coronary artery disease Father    • Heart disease Father      Review of Systems   Constitution: Negative for fever, malaise/fatigue, weight gain and weight loss.   HENT: Negative for ear pain, hearing loss, nosebleeds and sore throat.    Eyes: Negative for double vision, pain, vision loss in left eye and vision loss in right eye.   Cardiovascular:        See history of present illness.   Respiratory: Negative for cough, shortness of breath, sleep disturbances due to breathing, snoring and wheezing.    Endocrine: Negative for cold intolerance, heat intolerance and polyuria.   Skin: Negative for itching, poor wound healing and rash.   Musculoskeletal: Negative for joint pain, joint swelling and myalgias.   Gastrointestinal: Negative for abdominal pain, diarrhea, hematochezia, nausea and vomiting.   Genitourinary: Negative for hematuria and hesitancy.   Neurological: Negative for numbness, paresthesias and seizures.   Psychiatric/Behavioral: Negative for depression. The patient is not nervous/anxious.         Objective:     Vitals:    07/09/18 0951   BP: 120/80   Pulse: 57   Weight: 99.7 kg (219 lb 12.8 oz)   Height: 177.8 cm (70\")     Body mass index is 31.54 kg/m².    Physical Exam   Constitutional: He is oriented to person, place, and time. He appears " well-developed and well-nourished.   Obese   HENT:   Head: Normocephalic.   Nose: Nose normal.   Mouth/Throat: Oropharynx is clear and moist.   Eyes: Conjunctivae and EOM are normal. Pupils are equal, round, and reactive to light. Right eye exhibits no discharge. No scleral icterus.   Neck: Normal range of motion. Neck supple. No JVD present. No thyromegaly present.   Cardiovascular: Normal rate, regular rhythm, normal heart sounds and intact distal pulses.  Exam reveals no gallop and no friction rub.    No murmur heard.  Pulses:       Carotid pulses are 2+ on the right side, and 2+ on the left side.       Radial pulses are 2+ on the right side, and 2+ on the left side.        Femoral pulses are 2+ on the right side, and 2+ on the left side.       Popliteal pulses are 2+ on the right side, and 2+ on the left side.        Dorsalis pedis pulses are 2+ on the right side, and 2+ on the left side.        Posterior tibial pulses are 2+ on the right side, and 2+ on the left side.   Pulmonary/Chest: Effort normal and breath sounds normal. No respiratory distress. He has no wheezes. He has no rales.   Abdominal: Soft. Bowel sounds are normal. He exhibits no distension. There is no hepatosplenomegaly. There is no tenderness. There is no rebound.   Musculoskeletal: Normal range of motion. He exhibits no edema or tenderness.   Neurological: He is alert and oriented to person, place, and time.   Skin: Skin is warm and dry. No rash noted. No erythema.   Psychiatric: He has a normal mood and affect. His behavior is normal. Judgment and thought content normal.   Vitals reviewed.    Lab Review:     ECG 12 Lead  Date/Time: 7/9/2018 9:51 AM  Performed by: MAGGIE CALL  Authorized by: MAGGIE CALL   Comparison: compared with previous ECG   Similar to previous ECG  Rhythm: sinus rhythm  Ectopy: PVCs  Conduction: non-specific intraventricular conduction delay  Clinical impression: abnormal ECG          Assessment:       Diagnosis Plan   1.  Coronary artery disease involving native coronary artery of native heart without angina pectoris  ECG 12 Lead   2. Essential hypertension       Plan:       1.  Coronary artery disease with history of coronary artery bypass grafting.  Doing well without angina  2.  Hypertension.  Controlled  3.  Dyslipidemia, followed by Dr. Jones.  4.  History of renal cell carcinoma, status post resection.  5.  Colonic polyps.  6.  History of borderline elevated glucose.    Coronary Artery Disease  Assessment  • The patient has no angina    Plan  • Lifestyle modifications discussed include adhering to a heart healthy diet, regular exercise and regular monitoring of cholesterol and blood pressure    Subjective - Objective  • There is a history of previous coronary artery bypass graft  • Current antiplatelet therapy includes aspirin 81 mg           Your medication list          Accurate as of 7/9/18 11:59 PM. If you have any questions, ask your nurse or doctor.               CONTINUE taking these medications      Instructions Last Dose Given Next Dose Due   aspirin 81 MG tablet      Take 81 mg by mouth Daily. TO STOP 5 DAYS BEFORE SURGERY       folic acid 400 MCG tablet  Commonly known as:  FOLVITE      Take 400 mcg by mouth Every Night.       isosorbide mononitrate 30 MG 24 hr tablet  Commonly known as:  IMDUR      Take 1 tablet by mouth Every Night.       metoprolol succinate XL 50 MG 24 hr tablet  Commonly known as:  TOPROL XL      Take 1 tablet by mouth Every Night.       MULTIPLE VITAMIN PO      Take 1 tablet by mouth Every Evening. TO STOP 5 DAYS BEFORE SURGERY       simvastatin 20 MG tablet  Commonly known as:  ZOCOR      Take 20 mg by mouth Every Night.                  Dictated utilizing Dragon dictation

## 2018-10-22 ENCOUNTER — TELEPHONE (OUTPATIENT)
Dept: CARDIOLOGY | Facility: CLINIC | Age: 58
End: 2018-10-22

## 2018-10-22 NOTE — TELEPHONE ENCOUNTER
Got call from  the patient requesting erectile dysfunction medical treatment.  She asked about discontinuing Inderal and I told her that was fine as he is not having chest pain.  Many be continuing on metoprolol slowly in a staged manner.  For now she will just discontinue Imdur. tien

## 2019-01-24 ENCOUNTER — TELEPHONE (OUTPATIENT)
Dept: CARDIOLOGY | Facility: CLINIC | Age: 59
End: 2019-01-24

## 2019-01-24 NOTE — TELEPHONE ENCOUNTER
01/24/19  1:27 PM  Kev Mae  1960    Home Phone [phone]   Mobile [phone]       Kev Mae is a patient of Dr Charles.  Sommer asked that I triage this patient further.    He stated 3 or 4 days ago he experienced left sided chest (just under the nipple) pain, the pain didn't radiate, he became dizzy and faint and felt like he was floating and over all didn't feel good.  He didn't check bp or hr during the episode, said it lasted for about 45 min and then it quit.  He did say it was similar to the feeling he had with his MI in 2009.    The next morning his bp was 121/80 and he doesn't know what his heart rate is running.  The chest pain has resolved. And he stated he feels great.    Cardiac meds reviewed  Isosorbide mononitrate 30mg daily  Metoprolol succinate 50mg every evening.    Does he need to be seen?  Does he need testing?    Astrid Benitez RN  Triage nurse

## 2019-01-24 NOTE — TELEPHONE ENCOUNTER
Pt called and LMM re: dizziness presyncope, and CP about 4 days ago.    Called pt back and transferred to Triage RN to discuss.

## 2019-01-25 ENCOUNTER — OFFICE VISIT (OUTPATIENT)
Dept: CARDIOLOGY | Facility: CLINIC | Age: 59
End: 2019-01-25

## 2019-01-25 VITALS
DIASTOLIC BLOOD PRESSURE: 80 MMHG | WEIGHT: 231 LBS | HEART RATE: 58 BPM | BODY MASS INDEX: 33.07 KG/M2 | SYSTOLIC BLOOD PRESSURE: 110 MMHG | HEIGHT: 70 IN

## 2019-01-25 DIAGNOSIS — E78.5 DYSLIPIDEMIA: ICD-10-CM

## 2019-01-25 DIAGNOSIS — I25.119 CORONARY ARTERY DISEASE INVOLVING NATIVE CORONARY ARTERY OF NATIVE HEART WITH ANGINA PECTORIS (HCC): Primary | ICD-10-CM

## 2019-01-25 DIAGNOSIS — Z86.010 HX OF COLONIC POLYP: ICD-10-CM

## 2019-01-25 DIAGNOSIS — I10 ESSENTIAL HYPERTENSION: ICD-10-CM

## 2019-01-25 PROBLEM — Z86.0100 HX OF COLONIC POLYP: Status: ACTIVE | Noted: 2019-01-25

## 2019-01-25 PROCEDURE — 93000 ELECTROCARDIOGRAM COMPLETE: CPT | Performed by: NURSE PRACTITIONER

## 2019-01-25 PROCEDURE — 99214 OFFICE O/P EST MOD 30 MIN: CPT | Performed by: NURSE PRACTITIONER

## 2019-01-25 NOTE — PROGRESS NOTES
Date of Office Visit: 2019  Encounter Provider: Sunshine Webster, ISIDORO, APRN  Place of Service: [facility] CARDIOLOGY  Patient Name: Kev Mae  :1960        Subjective:     Chief Complaint:  Follow-up, coronary artery disease, history CABG, chest discomfort.      History of Present Illness:  Kev Mae is a 58 y.o. male patient of Dr. Charles.  This is my first time seeing this patient in the office today and I have reviewed his records.    Patient has a history of coronary artery disease, CABG , hypertension, hyperlipidemia, renal cell carcinoma status post resection, colon polyp.    Patient has a history of CAD with prior CABG in .  He was seen 2017 for chest discomfort.  He was found to have renal mass by CT imaging.  He underwent heart catheterization which showed occluded LIMA graft to LAD, mild to moderate disease of native LAD, vein graft to RCA and patent obtuse marginal branch.  He was treated medically.  He subsequently underwent laparoscopic radical nephrectomy with sigmoid resection of the mass.  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    Patient was last seen in office by Dr. Charles 18.  At this point he had been walking 15,000 steps a day.  He denied chest pain, shortness of breath, palpitations, edema, or syncope.  Patient had reported that his CT abdomen had been stable.    Patient called the office 19 reporting an episode of dizziness, presyncope, and chest pain that had occurred 4 days prior.  Last about 45 minutes and then resolved.  He reported it felt similar than it did when he had his MI in .  The next morning blood pressure was well controlled at 121/80.  The chest pain had resolved.  He was feeling great.  He was instructed to schedule an appointment with APRN to go to the ER if he had any recurrence before that time.      Patient presents to office today for follow-up visit.   Patient reports he has been feeling well overall since last visit.  He did have the episode of chest pain about 5 days ago.  It started when he was watching TV in bed.  He has some shortness of breath and lightheadedness associated with it.  It lasted about 35-45 minutes and then went away on its own.  It was below his left breast area.  It did not radiate.  The dizziness lasted longer than the chest discomfort.  He reports that the chest pain he had in 2009 when he had his heart attack was completely different, this was a midsternal pressure and discomfort.  He does not think he anything different before this event happened.  Blood pressure before it happened was about 120/82.  No associated palpitations, sweating, or flushing.  He remains very active doing park maintenance and denies any activity intolerance or exertional chest pain or shortness of breath or fatigue.  I discussed symptoms with patient's cardiology M.D. who agrees that this does not sounds like a typical cardiac chest pain.  At this point we will just continue to monitor patient.  If he continues to have these episodes or if he has any episodes that occur with exertion that he is to call the office right away for a follow-up visit.  Otherwise he will keep his 7/2009 follow-up as scheduled.  Besides this one episode he denies any chest pain, shortness of breath, shortness of breath with exertion, palpitations, racing heartbeat sensation, lower extremity edema, dizziness, syncope, near-syncope, falls, fatigue, or abnormal bleeding.          Past Medical History:   Diagnosis Date   • Bloody stool    • CAD (coronary artery disease)     CABG IN THE PAST   • Carotid artery plaque    • Colon polyps    • Coronary artery disease involving native coronary artery of native heart with unstable angina pectoris (CMS/HCC)    • Diaphoresis    • Diplopia    • Essential hypertension    • H. pylori infection 06/2017    [facility] in [place]   • Hyperlipidemia    •  Hypertension    • Nausea    • Past myocardial infarction 2009    INFERIOR   • Precordial pain    • Renal cancer (CMS/HCC) 03/08/2017    LEFT      DR. IYER   • SOB (shortness of breath)      Past Surgical History:   Procedure Laterality Date   • AMPUTATION DIGIT Right     X2 RING FINGER   • CARDIAC CATHETERIZATION N/A 2/10/2017    Procedure: Left Heart Cath;  Surgeon: Leland Carpio MD;  Location: [facility] CATH INVASIVE LOCATION;  Service:    • CARDIAC CATHETERIZATION N/A 2009       • CARDIAC CATHETERIZATION N/A 08/26/2015       • CARDIAC CATHETERIZATION N/A 06/28/2012       • COLON RESECTION N/A 5/25/2017    Procedure: LAPAROSCOPIC SIGMOID COLON RESECTION, MOBILIZATION OF SPLENIC FLEXURE;  Surgeon: Claudia Tang MD;  Location: [facility] MAIN OR;  Service:    • COLONOSCOPY N/A 4/3/2017    Procedure: COLONOSCOPY TO CECUM WITH COLD POLYPECTOMIES, COLD BIOPSIES;  Surgeon: Concepción Taylor MD;  Location: [facility] ENDOSCOPY;  Service:    • CORONARY ARTERY BYPASS GRAFT N/A 2009    3 VESSEL   • HERNIA REPAIR     • NEPHRECTOMY Left 5/25/2017    Procedure: LAPAROSCOPIC LEFT NEPHRECTOMY ;  Surgeon: Dima Iyer MD;  Location: [facility] MAIN OR;  Service:    • ROTATOR CUFF REPAIR Left 2015   • SIGMOIDOSCOPY N/A 4/21/2017    Procedure: SIGMOIDOSCOPY FLEXIBLE to Descending Colon, injected with 7 ML Spot Ink;  Surgeon: Claudia Tang MD;  Location: [facility] ENDOSCOPY;  Service:    • VASECTOMY N/A     HAD 2 SURGERIES     Outpatient Medications Prior to Visit   Medication Sig Dispense Refill   • aspirin 81 MG tablet Take 81 mg by mouth Daily. TO STOP 5 DAYS BEFORE SURGERY     • folic acid (FOLVITE) 400 MCG tablet Take 400 mcg by mouth Every Night.     • isosorbide mononitrate (IMDUR) 30 MG 24 hr tablet Take 1 tablet by mouth Every Night. 14 tablet 0   • metoprolol succinate XL (TOPROL XL) 50 MG 24 hr tablet Take 1 tablet by mouth Every Night. 14 tablet 0   • MULTIPLE VITAMIN PO Take 1 tablet by mouth Every Evening.  TO STOP 5 DAYS BEFORE SURGERY     • simvastatin (ZOCOR) 20 MG tablet Take 20 mg by mouth Every Night.       No facility-administered medications prior to visit.        Allergies as of 2019   • (No Known Allergies)     Social History     Socioeconomic History   • Marital status:      Spouse name: Not on file   • Number of children: Not on file   • Years of education: Not on file   • Highest education level: Not on file   Social Needs   • Financial resource strain: Not on file   • Food insecurity - worry: Not on file   • Food insecurity - inability: Not on file   • Transportation needs - medical: Not on file   • Transportation needs - non-medical: Not on file   Occupational History   • Not on file   Tobacco Use   • Smoking status: Former Smoker     Packs/day: 2.00     Years: 32.00     Pack years: 64.00     Types: Cigarettes     Last attempt to quit: 2009     Years since quittin.6   • Smokeless tobacco: Never Used   • Tobacco comment: Daily caffeine use   Substance and Sexual Activity   • Alcohol use: Yes     Comment: social drinker   • Drug use: No   • Sexual activity: Defer   Other Topics Concern   • Not on file   Social History Narrative   • Not on file     Family History   Problem Relation Age of Onset   • Heart attack Mother    • Leukemia Mother    • Heart attack Brother    • Coronary artery disease Brother    • Heart disease Brother    • Deep vein thrombosis Cousin         with embolic death   • Coronary artery disease Father    • Heart disease Father        Review of Systems   Constitution: Positive for malaise/fatigue and weight gain. Negative for chills, fever and weight loss.   HENT: Negative for ear pain, hearing loss, nosebleeds and sore throat.    Eyes: Negative for blurred vision, double vision, redness, vision loss in left eye, vision loss in right eye and visual disturbance.   Cardiovascular:        SEE HPI   Respiratory: Negative for cough, hemoptysis, shortness of breath, snoring  "and wheezing.    Endocrine: Negative for cold intolerance and heat intolerance.   Skin: Negative for itching, rash and suspicious lesions.   Musculoskeletal: Negative for joint pain, joint swelling and myalgias.   Gastrointestinal: Negative for abdominal pain, diarrhea, hematemesis, melena, nausea and vomiting.   Genitourinary: Negative for dysuria, frequency and hematuria.        ED   Neurological: Positive for light-headedness (one episode). Negative for dizziness, headaches, numbness, paresthesias and seizures.   Psychiatric/Behavioral: Negative for altered mental status and depression. The patient is not nervous/anxious.           Objective:     Vitals:    01/25/19 1337   BP: 110/80   BP Location: Left arm   Cuff Size: Large Adult   Pulse: 58   Weight: 105 kg (231 lb)   Height: 177.8 cm (70\")     Body mass index is 33.15 kg/m².      PHYSICAL EXAM:  Physical Exam   Constitutional: He is oriented to person, place, and time. He appears well-developed and well-nourished. No distress.   HENT:   Head: Normocephalic and atraumatic.   Eyes: Pupils are equal, round, and reactive to light.   Neck: Neck supple. No JVD present. Carotid bruit is not present. No tracheal deviation present.   Cardiovascular: Normal rate, regular rhythm, normal heart sounds and intact distal pulses. Exam reveals no gallop and no friction rub.   No murmur heard.  Pulses:       Radial pulses are 2+ on the right side, and 2+ on the left side.        Posterior tibial pulses are 2+ on the right side, and 2+ on the left side.   Pulmonary/Chest: Effort normal and breath sounds normal. No respiratory distress. He has no wheezes. He has no rales.   Abdominal: Soft. Bowel sounds are normal. He exhibits no distension. There is no tenderness.   Musculoskeletal: He exhibits no edema, tenderness or deformity.   Neurological: He is alert and oriented to person, place, and time.   Skin: Skin is warm and dry. No rash noted. He is not diaphoretic. No erythema. "   Psychiatric: He has a normal mood and affect. His behavior is normal. Judgment normal.           ECG 12 Lead  Date/Time: 1/25/2019 2:15 PM  Performed by: Sunshine Webster DNP, APRN  Authorized by: Sunshine Webster DNP, APRN   Comparison: compared with previous ECG from 7/9/2018  Similar to previous ECG  Comparison to previous ECG: No PVC on today's ECG.  Rhythm: sinus rhythm  Rate: bradycardic  BPM: 58  Conduction: non-specific intraventricular conduction delay  Conduction comments:  ms.  QTc 409 ms.   Clinical impression: non-specific ECG              Assessment:       Diagnosis Plan   1. Coronary artery disease involving native coronary artery of native heart with angina pectoris (CMS/HCC)     2. Essential hypertension     3. Dyslipidemia     4. Hx of colonic polyp           Plan:     1. Coronary artery disease: hx CABG.  One episode of chest pain about 5 days ago that does not sound cardiac.  Nonspecific.  Patient to notify office if he has any recurrence of this or if he develops any symptoms with exertion.  We will continue to monitor at this time.  2. Hypertension: Blood pressure well controlled in office today at 110/80 mmHg.  Patient reports blood pressure outside the office has been staying 120s over 80-84.  He will continue to monitor.  3. Dyslipidemia: Followed by primary care provider.  4. History borderline elevated blood sugar: Followed by outside provider.  5. History renal cell carcinoma s/p resection: Followed by outside provider.  6. History of colonic polyps.    Patient to keep his 7/2019 follow-up appointment with Dr. Charles as scheduled or follow-up sooner if needed for any new, recurrent, or worsening symptoms or other problems/concerns.           Your medication list           Accurate as of 1/25/19  2:17 PM. If you have any questions, ask your nurse or doctor.               CONTINUE taking these medications      Instructions Last Dose Given Next Dose Due   aspirin 81 MG tablet       Take 81 mg by mouth Daily. TO STOP 5 DAYS BEFORE SURGERY       folic acid 400 MCG tablet  Commonly known as:  FOLVITE      Take 400 mcg by mouth Every Night.       isosorbide mononitrate 30 MG 24 hr tablet  Commonly known as:  IMDUR      Take 1 tablet by mouth Every Night.       metoprolol succinate XL 50 MG 24 hr tablet  Commonly known as:  TOPROL XL      Take 1 tablet by mouth Every Night.       MULTIPLE VITAMIN PO      Take 1 tablet by mouth Every Evening. TO STOP 5 DAYS BEFORE SURGERY       simvastatin 20 MG tablet  Commonly known as:  ZOCOR      Take 20 mg by mouth Every Night.                      Thanks,    Sunshine Webster, ISIDORO, APRN  01/25/2019             Dictated utilizing Dragon dictation

## 2019-07-17 ENCOUNTER — OFFICE VISIT (OUTPATIENT)
Dept: CARDIOLOGY | Facility: CLINIC | Age: 59
End: 2019-07-17

## 2019-07-17 VITALS
HEIGHT: 70 IN | BODY MASS INDEX: 31.35 KG/M2 | SYSTOLIC BLOOD PRESSURE: 132 MMHG | HEART RATE: 57 BPM | DIASTOLIC BLOOD PRESSURE: 82 MMHG | WEIGHT: 219 LBS

## 2019-07-17 DIAGNOSIS — I25.810 CORONARY ARTERY DISEASE INVOLVING CORONARY BYPASS GRAFT OF NATIVE HEART WITHOUT ANGINA PECTORIS: Primary | ICD-10-CM

## 2019-07-17 DIAGNOSIS — E78.5 DYSLIPIDEMIA: ICD-10-CM

## 2019-07-17 DIAGNOSIS — I10 ESSENTIAL HYPERTENSION: ICD-10-CM

## 2019-07-17 PROCEDURE — 99213 OFFICE O/P EST LOW 20 MIN: CPT | Performed by: INTERNAL MEDICINE

## 2019-07-17 PROCEDURE — 93000 ELECTROCARDIOGRAM COMPLETE: CPT | Performed by: INTERNAL MEDICINE

## 2019-07-17 NOTE — PROGRESS NOTES
Date of Office Visit: 2019  Encounter Provider: Hina Charles MD  Place of Service: [facility] CARDIOLOGY  Patient Name: Kev Mae  :1960    Chief complaint  Follow-up of coronary artery disease    History of Present Illness  The patient is a 58year-old gentleman with history of coronary artery disease with prior coronary artery bypass grafting in . He was seen in 2017 with chest discomfort. He was found to have a renal mass by CT imaging. He also underwent cardiac catheterization which showed an occluded LIMA graft to the LAD, mild-to-moderate disease of the native LAD with a vein graft to the right coronary artery disease and obtuse marginal branch that was patent. He was treated medically. He subsequently underwent laparoscopic radical nephrectomy with sigmoid resection of the mass. 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 He now presents for followup.    Since last visit he is walking 9000 steps at work on it almost daily basis.  He denies any chest pain, shortness of breath, palpitations, syncope near syncope.  Blood pressure at home has shown slightly higher diastolic pressures.  He is not on a strict low-salt diet.  He did have an EGD for dysphagia and this improved with decrease caffeine intake.  There is discussion about possible repeat EGD and biopsy at a later date symptoms recur    Past Medical History:   Diagnosis Date   • Bloody stool    • CAD (coronary artery disease)     CABG IN THE PAST   • Carotid artery plaque    • Colon polyps    • Coronary artery disease involving native coronary artery of native heart with unstable angina pectoris (CMS/HCC)    • Diaphoresis    • Diplopia    • Essential hypertension    • H. pylori infection 2017    [facility] in [place]   • Hyperlipidemia    • Hypertension    • Nausea    • Past myocardial infarction     INFERIOR   • Precordial pain    • Renal cancer (CMS/HCC)  03/08/2017    LEFT      DR. IYER   • SOB (shortness of breath)      Past Surgical History:   Procedure Laterality Date   • AMPUTATION DIGIT Right     X2 RING FINGER   • CARDIAC CATHETERIZATION N/A 2/10/2017    Procedure: Left Heart Cath;  Surgeon: Leland Carpio MD;  Location: [facility] CATH INVASIVE LOCATION;  Service:    • CARDIAC CATHETERIZATION N/A 2009       • CARDIAC CATHETERIZATION N/A 08/26/2015       • CARDIAC CATHETERIZATION N/A 06/28/2012       • COLON RESECTION N/A 5/25/2017    Procedure: LAPAROSCOPIC SIGMOID COLON RESECTION, MOBILIZATION OF SPLENIC FLEXURE;  Surgeon: Claudia Tang MD;  Location: [facility] MAIN OR;  Service:    • COLONOSCOPY N/A 4/3/2017    Procedure: COLONOSCOPY TO CECUM WITH COLD POLYPECTOMIES, COLD BIOPSIES;  Surgeon: Concepción Taylor MD;  Location: [facility] ENDOSCOPY;  Service:    • CORONARY ARTERY BYPASS GRAFT N/A 2009    3 VESSEL   • HERNIA REPAIR     • NEPHRECTOMY Left 5/25/2017    Procedure: LAPAROSCOPIC LEFT NEPHRECTOMY ;  Surgeon: Dima Iyer MD;  Location: [facility] MAIN OR;  Service:    • ROTATOR CUFF REPAIR Left 2015   • SIGMOIDOSCOPY N/A 4/21/2017    Procedure: SIGMOIDOSCOPY FLEXIBLE to Descending Colon, injected with 7 ML Spot Ink;  Surgeon: Claudia Tang MD;  Location: [facility] ENDOSCOPY;  Service:    • VASECTOMY N/A     HAD 2 SURGERIES     Outpatient Medications Prior to Visit   Medication Sig Dispense Refill   • aspirin 81 MG tablet Take 81 mg by mouth Daily. TO STOP 5 DAYS BEFORE SURGERY     • folic acid (FOLVITE) 400 MCG tablet Take 400 mcg by mouth Every Night.     • isosorbide mononitrate (IMDUR) 30 MG 24 hr tablet Take 1 tablet by mouth Every Night. 14 tablet 0   • metoprolol succinate XL (TOPROL XL) 50 MG 24 hr tablet Take 1 tablet by mouth Every Night. 14 tablet 0   • MULTIPLE VITAMIN PO Take 1 tablet by mouth Every Evening. TO STOP 5 DAYS BEFORE SURGERY     • simvastatin (ZOCOR) 20 MG tablet Take 20 mg by mouth Every Night.       No  facility-administered medications prior to visit.        Allergies as of 07/17/2019   • (No Known Allergies)     Social History     Socioeconomic History   • Marital status:      Spouse name: Not on file   • Number of children: Not on file   • Years of education: Not on file   • Highest education level: Not on file   Tobacco Use   • Smoking status: Former Smoker     Packs/day: 2.00     Years: 32.00     Pack years: 64.00     Types: Cigarettes     Last attempt to quit: 5/25/2009     Years since quitting: 10.1   • Smokeless tobacco: Never Used   • Tobacco comment: Daily caffeine use   Substance and Sexual Activity   • Alcohol use: Yes     Comment: social drinker   • Drug use: No   • Sexual activity: Defer     Family History   Problem Relation Age of Onset   • Heart attack Mother    • Leukemia Mother    • Heart attack Brother    • Coronary artery disease Brother    • Heart disease Brother    • Deep vein thrombosis Cousin         with embolic death   • Coronary artery disease Father    • Heart disease Father      Review of Systems   Constitution: Negative for fever, malaise/fatigue, weight gain and weight loss.   HENT: Negative for ear pain, hearing loss, nosebleeds and sore throat.    Eyes: Negative for double vision, pain, vision loss in left eye and vision loss in right eye.   Cardiovascular:        See history of present illness.   Respiratory: Negative for cough, shortness of breath, sleep disturbances due to breathing, snoring and wheezing.    Endocrine: Negative for cold intolerance, heat intolerance and polyuria.   Skin: Negative for itching, poor wound healing and rash.   Musculoskeletal: Negative for joint pain, joint swelling and myalgias.   Gastrointestinal: Negative for abdominal pain, diarrhea, hematochezia, nausea and vomiting.   Genitourinary: Negative for hematuria and hesitancy.   Neurological: Negative for numbness, paresthesias and seizures.   Psychiatric/Behavioral: Negative for depression.  "The patient is not nervous/anxious.         Objective:     Vitals:    07/17/19 1234   BP: 132/82   Pulse: 57   Weight: 99.3 kg (219 lb)   Height: 177.8 cm (70\")     Body mass index is 31.42 kg/m².    Physical Exam   Constitutional: He is oriented to person, place, and time. He appears well-developed and well-nourished.   Obese   HENT:   Head: Normocephalic.   Nose: Nose normal.   Mouth/Throat: Oropharynx is clear and moist.   Eyes: Conjunctivae and EOM are normal. Pupils are equal, round, and reactive to light. Right eye exhibits no discharge. No scleral icterus.   Neck: Normal range of motion. Neck supple. No JVD present. No thyromegaly present.   Cardiovascular: Normal rate, regular rhythm, normal heart sounds and intact distal pulses. Exam reveals no gallop and no friction rub.   No murmur heard.  Pulses:       Carotid pulses are 2+ on the right side, and 2+ on the left side.       Radial pulses are 2+ on the right side, and 2+ on the left side.        Femoral pulses are 2+ on the right side, and 2+ on the left side.       Popliteal pulses are 2+ on the right side, and 2+ on the left side.        Dorsalis pedis pulses are 2+ on the right side, and 2+ on the left side.        Posterior tibial pulses are 2+ on the right side, and 2+ on the left side.   Pulmonary/Chest: Effort normal and breath sounds normal. No respiratory distress. He has no wheezes. He has no rales.   Abdominal: Soft. Bowel sounds are normal. He exhibits no distension. There is no hepatosplenomegaly. There is no tenderness. There is no rebound.   Musculoskeletal: Normal range of motion. He exhibits no edema or tenderness.   Neurological: He is alert and oriented to person, place, and time.   Skin: Skin is warm and dry. No rash noted. No erythema.   Psychiatric: He has a normal mood and affect. His behavior is normal. Judgment and thought content normal.   Vitals reviewed.    Lab Review:     ECG 12 Lead  Date/Time: 7/17/2019 12:40 PM  Performed " by: Hina Charles MD  Authorized by: Hina Charles MD   Comparison: compared with previous ECG   Similar to previous ECG  Rhythm: sinus rhythm  Other findings: left atrial abnormality    Clinical impression: abnormal EKG          Assessment:       Diagnosis Plan   1. Coronary artery disease involving coronary bypass graft of native heart without angina pectoris  ECG 12 Lead   2. Essential hypertension     3. Dyslipidemia       Plan:       1.  Coronary artery disease with history of coronary artery bypass grafting 2009.  Last coronary evaluation in February 2017 without significant residual obstructive disease doing well without angina plan on treadmill exercise stress test in 1 year.  2.  Hypertension. Slightly elevated today. Will work on vigilant low salt diet and increase his exercise. Call me in 4 weeks  3.  Dyslipidemia, followed by Dr. Jones.  Goal LDL less than 70, HDL greater than 40, triglycerides less than 150  4.  History of renal cell carcinoma, status post resection.  5.  Colonic polyps.  6.  History of borderline elevated glucose. Increase exercise  7.  Vascular screening.  I recommended vascular screening every 2 to 3 years.  He will do this and call back if he wishes to have this done  8.  Dysphagia with recent EGD. Better off caffine. Followed by Dr Schwarz/Karen        Your medication list           Accurate as of 7/17/19  9:48 PM. If you have any questions, ask your nurse or doctor.               CONTINUE taking these medications      Instructions Last Dose Given Next Dose Due   aspirin 81 MG tablet      Take 81 mg by mouth Daily. TO STOP 5 DAYS BEFORE SURGERY       folic acid 400 MCG tablet  Commonly known as:  FOLVITE      Take 400 mcg by mouth Every Night.       isosorbide mononitrate 30 MG 24 hr tablet  Commonly known as:  IMDUR      Take 1 tablet by mouth Every Night.       metoprolol succinate XL 50 MG 24 hr tablet  Commonly known as:  TOPROL XL      Take 1 tablet by mouth Every Night.        MULTIPLE VITAMIN PO      Take 1 tablet by mouth Every Evening. TO STOP 5 DAYS BEFORE SURGERY       simvastatin 20 MG tablet  Commonly known as:  ZOCOR      Take 20 mg by mouth Every Night.            Dictated utilizing Dragon dictation

## 2020-02-27 ENCOUNTER — PREP FOR SURGERY (OUTPATIENT)
Dept: OTHER | Facility: HOSPITAL | Age: 60
End: 2020-02-27

## 2020-02-27 DIAGNOSIS — Z86.010 HISTORY OF COLON POLYPS: Primary | ICD-10-CM

## 2020-03-02 PROBLEM — Z86.010 HISTORY OF COLON POLYPS: Status: ACTIVE | Noted: 2020-03-02

## 2020-03-02 PROBLEM — Z86.0100 HISTORY OF COLON POLYPS: Status: ACTIVE | Noted: 2020-03-02

## 2021-07-06 ENCOUNTER — HOSPITAL ENCOUNTER (OUTPATIENT)
Facility: HOSPITAL | Age: 61
Discharge: HOME | End: 2021-07-07
Attending: INTERNAL MEDICINE | Admitting: INTERNAL MEDICINE

## 2021-07-06 DIAGNOSIS — I10 ESSENTIAL HYPERTENSION: ICD-10-CM

## 2021-07-06 DIAGNOSIS — I25.110 CORONARY ARTERY DISEASE INVOLVING NATIVE CORONARY ARTERY OF NATIVE HEART WITH UNSTABLE ANGINA PECTORIS (HCC): Primary | ICD-10-CM

## 2021-07-06 LAB
ANION GAP SERPL CALCULATED.3IONS-SCNC: 12.6 MMOL/L (ref 5–15)
BUN SERPL-MCNC: 15 MG/DL (ref 6–20)
BUN/CREAT SERPL: 12.1 (ref 7–25)
CALCIUM SPEC-SCNC: 9.1 MG/DL (ref 8.6–10.5)
CHLORIDE SERPL-SCNC: 105 MMOL/L (ref 98–107)
CO2 SERPL-SCNC: 22.4 MMOL/L (ref 22–29)
CREAT SERPL-MCNC: 1.24 MG/DL (ref 0.76–1.27)
GFR SERPL CREATININE-BSD FRML MDRD: 60 ML/MIN/1.73
GLUCOSE SERPL-MCNC: 83 MG/DL (ref 65–99)
POTASSIUM SERPL-SCNC: 4.3 MMOL/L (ref 3.5–5.2)
QT INTERVAL: 459 MS
SARS-COV-2 ORF1AB RESP QL NAA+PROBE: NOT DETECTED
SODIUM SERPL-SCNC: 140 MMOL/L (ref 136–145)
TROPONIN T SERPL-MCNC: <0.01 NG/ML (ref 0–0.03)

## 2021-07-06 PROCEDURE — 93459 L HRT ART/GRFT ANGIO: CPT | Performed by: INTERNAL MEDICINE

## 2021-07-06 PROCEDURE — G0378 HOSPITAL OBSERVATION PER HR: HCPCS

## 2021-07-06 PROCEDURE — C1894 INTRO/SHEATH, NON-LASER: HCPCS | Performed by: INTERNAL MEDICINE

## 2021-07-06 PROCEDURE — C1887 CATHETER, GUIDING: HCPCS | Performed by: INTERNAL MEDICINE

## 2021-07-06 PROCEDURE — 99220 PR INITIAL OBSERVATION CARE/DAY 70 MINUTES: CPT | Performed by: INTERNAL MEDICINE

## 2021-07-06 PROCEDURE — 25010000002 MIDAZOLAM PER 1 MG: Performed by: INTERNAL MEDICINE

## 2021-07-06 PROCEDURE — 25010000002 FENTANYL CITRATE (PF) 50 MCG/ML SOLUTION: Performed by: INTERNAL MEDICINE

## 2021-07-06 PROCEDURE — U0004 COV-19 TEST NON-CDC HGH THRU: HCPCS | Performed by: INTERNAL MEDICINE

## 2021-07-06 PROCEDURE — 25010000002 HEPARIN (PORCINE) PER 1000 UNITS: Performed by: INTERNAL MEDICINE

## 2021-07-06 PROCEDURE — 0 IOPAMIDOL PER 1 ML: Performed by: INTERNAL MEDICINE

## 2021-07-06 PROCEDURE — 84484 ASSAY OF TROPONIN QUANT: CPT | Performed by: INTERNAL MEDICINE

## 2021-07-06 PROCEDURE — C1769 GUIDE WIRE: HCPCS | Performed by: INTERNAL MEDICINE

## 2021-07-06 PROCEDURE — 85347 COAGULATION TIME ACTIVATED: CPT

## 2021-07-06 PROCEDURE — 93010 ELECTROCARDIOGRAM REPORT: CPT | Performed by: INTERNAL MEDICINE

## 2021-07-06 PROCEDURE — 99153 MOD SED SAME PHYS/QHP EA: CPT | Performed by: INTERNAL MEDICINE

## 2021-07-06 PROCEDURE — 80048 BASIC METABOLIC PNL TOTAL CA: CPT | Performed by: INTERNAL MEDICINE

## 2021-07-06 PROCEDURE — 93005 ELECTROCARDIOGRAM TRACING: CPT | Performed by: INTERNAL MEDICINE

## 2021-07-06 PROCEDURE — 99152 MOD SED SAME PHYS/QHP 5/>YRS: CPT | Performed by: INTERNAL MEDICINE

## 2021-07-06 RX ORDER — HEPARIN SODIUM 1000 [USP'U]/ML
INJECTION, SOLUTION INTRAVENOUS; SUBCUTANEOUS AS NEEDED
Status: DISCONTINUED | OUTPATIENT
Start: 2021-07-06 | End: 2021-07-06 | Stop reason: HOSPADM

## 2021-07-06 RX ORDER — DEXTROSE AND SODIUM CHLORIDE 5; .45 G/100ML; G/100ML
75 INJECTION, SOLUTION INTRAVENOUS CONTINUOUS
Status: DISCONTINUED | OUTPATIENT
Start: 2021-07-06 | End: 2021-07-06

## 2021-07-06 RX ORDER — ISOSORBIDE MONONITRATE 30 MG/1
30 TABLET, EXTENDED RELEASE ORAL
Status: DISCONTINUED | OUTPATIENT
Start: 2021-07-06 | End: 2021-07-07 | Stop reason: HOSPADM

## 2021-07-06 RX ORDER — ATORVASTATIN CALCIUM 20 MG/1
40 TABLET, FILM COATED ORAL NIGHTLY
Status: DISCONTINUED | OUTPATIENT
Start: 2021-07-06 | End: 2021-07-07 | Stop reason: HOSPADM

## 2021-07-06 RX ORDER — METOPROLOL SUCCINATE 50 MG/1
50 TABLET, EXTENDED RELEASE ORAL
Status: DISCONTINUED | OUTPATIENT
Start: 2021-07-06 | End: 2021-07-07 | Stop reason: HOSPADM

## 2021-07-06 RX ORDER — SODIUM CHLORIDE 9 MG/ML
100 INJECTION, SOLUTION INTRAVENOUS CONTINUOUS
Status: ACTIVE | OUTPATIENT
Start: 2021-07-06 | End: 2021-07-06

## 2021-07-06 RX ORDER — NITROGLYCERIN 0.4 MG/1
0.4 TABLET SUBLINGUAL
Status: DISCONTINUED | OUTPATIENT
Start: 2021-07-06 | End: 2021-07-07 | Stop reason: HOSPADM

## 2021-07-06 RX ORDER — MIDAZOLAM HYDROCHLORIDE 1 MG/ML
INJECTION INTRAMUSCULAR; INTRAVENOUS AS NEEDED
Status: DISCONTINUED | OUTPATIENT
Start: 2021-07-06 | End: 2021-07-06 | Stop reason: HOSPADM

## 2021-07-06 RX ORDER — SODIUM CHLORIDE 9 MG/ML
INJECTION, SOLUTION INTRAVENOUS CONTINUOUS PRN
Status: COMPLETED | OUTPATIENT
Start: 2021-07-06 | End: 2021-07-06

## 2021-07-06 RX ORDER — FENTANYL CITRATE 50 UG/ML
INJECTION, SOLUTION INTRAMUSCULAR; INTRAVENOUS AS NEEDED
Status: DISCONTINUED | OUTPATIENT
Start: 2021-07-06 | End: 2021-07-06 | Stop reason: HOSPADM

## 2021-07-06 RX ORDER — ASPIRIN 81 MG/1
81 TABLET ORAL DAILY
Status: DISCONTINUED | OUTPATIENT
Start: 2021-07-06 | End: 2021-07-07 | Stop reason: HOSPADM

## 2021-07-06 RX ORDER — LIDOCAINE HYDROCHLORIDE 20 MG/ML
INJECTION, SOLUTION INFILTRATION; PERINEURAL AS NEEDED
Status: DISCONTINUED | OUTPATIENT
Start: 2021-07-06 | End: 2021-07-06 | Stop reason: HOSPADM

## 2021-07-06 RX ADMIN — ATORVASTATIN CALCIUM 40 MG: 20 TABLET, FILM COATED ORAL at 20:04

## 2021-07-06 NOTE — DISCHARGE INSTRUCTIONS
[facility]  [address] [place] [address]    Coronary Angiogram (Radial/Ulnar Approach) After Care    Refer to this sheet in the next few weeks. These instructions provide you with information on caring for yourself after your procedure. Your caregiver may also give you more specific instructions. Your treatment has been planned according to current medical practices, but problems sometimes occur. Call your caregiver if you have any problems or questions after your procedure.    Home Care Instructions:  · You may shower the day after the procedure. Remove the bandage (dressing) and gently wash the site with plain soap and water. Gently pat the site dry. You may apply a band aid daily for 2 days if desired.    · Do not apply powder or lotion to the site.  · Do not submerge the affected site in water for 3 to 5 days or until the site is completely healed.   · Do not lift, push or pull anything over 5 pounds for 5 days after your procedure or as directed by your physician.  As a reference, a gallon of milk weighs 8 pounds.   · Inspect the site at least twice daily. You may notice some bruising at the site and it may be tender for 1 to 2 weeks.     · Increase your fluid intake for the next 2 days.    · Keep arm elevated for 24 hours. For the remainder of the day, keep your arm in “Pledge of Allegiance” position when up and about.     · You may drive 24 hours after the procedure unless otherwise instructed by your caregiver.  · Do not operate machinery or power tools for 24 hours.  · A responsible adult should be with you for the first 24 hours after you arrive home. Do not make any important legal decisions or sign legal papers for 24 hours.  Do not drink alcohol for 24 hours.    · Metformin or any medications containing Metformin should not be taken for 48 hours after your procedure.      Call Your Doctor if:   · You have unusual pain at the radial/ulnar (wrist) site.  · You have redness, warmth,  swelling, or pain at the radial/ulnar (wrist) site.  · You have drainage (other than a small amount of blood on the dressing).  · You have chills or a fever > 101.  · Your arm becomes pale or dark, cool, tingly, or numb.  · You have heavy bleeding from the site, hold pressure on the site for 20 minutes.  If the bleeding stops, apply a fresh bandage and call your cardiologist.  However, if you continue to have bleeding, call 911.

## 2021-07-06 NOTE — PLAN OF CARE
Goal Outcome Evaluation:                   Problem: Adult Inpatient Plan of Care  Goal: Plan of Care Review  7/6/2021 0310 by Luz Riojas, RN  Outcome: Ongoing, Progressing  Flowsheets (Taken 7/6/2021 0306)  Outcome Summary: patient direct admit from [facility] for chest pain that apparently started during earlier on 7/5/2021. apparently pain was substernal chest squeezing, did not radiate. pt took nitroglycerine at home and the pain went away. he is also on aspirin 81 mg PO. patient got here around 0230 and did not report any chest pain. Chest pain has been gone since around 2 pm on 7/5/2021. Vital signs stable. Call put out to Dr. Weeks for admission orders. PTA med list in progress. wife will bring in med list tomorrow.  7/6/2021 0310 by Luz Riojas, RN  Outcome: Ongoing, Progressing  Flowsheets (Taken 7/6/2021 0306)  Outcome Summary: patient direct admit from [facility] for chest pain that apparently started during earlier on 7/5/2021. apparently pain was substernal chest squeezing, did not radiate. pt took nitroglycerine at home and the pain went away. he is also on aspirin 81 mg PO. patient got here around 0230 and did not report any chest pain. Chest pain has been gone since around 2 pm on 7/5/2021. Vital signs stable. Call put out to Dr. Weeks for admission orders. PTA med list in progress. wife will bring in med list tomorrow.  7/6/2021 0306 by Luz Riojas, RN  Outcome: Ongoing, Progressing  Flowsheets (Taken 7/6/2021 0306)  Progress: improving  Plan of Care Reviewed With: patient  Outcome Summary: patient direct admit from [facility] for chest pain that apparently started during earlier on 7/5/2021. apparently pain was substernal chest squeezing, did not radiate. pt took nitroglycerine at home and the pain went away. he is also on aspirin 81 mg PO. patient got here around 0230 and did not report any chest pain. Chest pain has been gone since around 2 pm on 7/5/2021. Vital signs  stable. Call put out to Dr. Weeks for admission orders. PTA med list in progress. wife will bring in med list tomorrow.  Goal: Patient-Specific Goal (Individualized)  7/6/2021 0310 by Luz Riojas RN  Outcome: Ongoing, Progressing  7/6/2021 0310 by Luz Riojas RN  Outcome: Ongoing, Progressing  7/6/2021 0306 by Luz Riojas RN  Outcome: Ongoing, Progressing  Goal: Absence of Hospital-Acquired Illness or Injury  7/6/2021 0310 by Luz Riojas RN  Outcome: Ongoing, Progressing  7/6/2021 0310 by Luz Riojas RN  Outcome: Ongoing, Progressing  7/6/2021 0306 by Luz Riojas RN  Outcome: Ongoing, Progressing  Intervention: Identify and Manage Fall Risk  Recent Flowsheet Documentation  Taken 7/6/2021 0303 by Luz Riojas RN  Safety Promotion/Fall Prevention:   activity supervised   clutter free environment maintained   fall prevention program maintained   nonskid shoes/slippers when out of bed   room organization consistent   safety round/check completed  Intervention: Prevent Skin Injury  Recent Flowsheet Documentation  Taken 7/6/2021 0303 by Luz Riojas RN  Body Position:   position changed independently   supine  Intervention: Prevent Infection  Recent Flowsheet Documentation  Taken 7/6/2021 0303 by Lzu Riojas RN  Infection Prevention:   hand hygiene promoted   rest/sleep promoted   single patient room provided   personal protective equipment utilized   visitors restricted/screened  Goal: Optimal Comfort and Wellbeing  7/6/2021 0310 by Luz Riojas RN  Outcome: Ongoing, Progressing  7/6/2021 0310 by Luz Riojas RN  Outcome: Ongoing, Progressing  7/6/2021 0306 by Luz Riojas RN  Outcome: Ongoing, Progressing  Intervention: Provide Person-Centered Care  Recent Flowsheet Documentation  Taken 7/6/2021 0303 by Luz Riojas RN  Trust Relationship/Rapport:   care explained   choices provided  Goal: Readiness for Transition of Care  7/6/2021 0310 by Luz Riojas RN  Outcome: Ongoing, Progressing  7/6/2021 0310  by Luz Riojas, RN  Outcome: Ongoing, Progressing  7/6/2021 0306 by Luz Riojas, RN  Outcome: Ongoing, Progressing  Intervention: Mutually Develop Transition Plan  Recent Flowsheet Documentation  Taken 7/6/2021 0234 by Luz Riojas, RN  Transportation Anticipated: family or friend will provide  Patient/Family Anticipated Services at Transition: none  Patient/Family Anticipates Transition to: home with family  Taken 7/6/2021 0233 by Luz Riojas, RN  Equipment Currently Used at Home: none       patient direct admit from [facility] for chest pain that apparently started during earlier on 7/5/2021. apparently pain was substernal chest squeezing, did not radiate. pt took nitroglycerine at home and the pain went away. he is also on aspirin 81 mg PO. patient got here around 0230 and did not report any chest pain. Chest pain has been gone since around 2 pm on 7/5/2021. Vital signs stable. Call put out to Dr. Weeks for admission orders. PTA med list in progress. wife will bring in med list tomorrow.

## 2021-07-06 NOTE — H&P
[facility]  Consult Note                                                                              7/6/2021  Liban Weeks MD    Patient Identification:  Kev Mae:   60 y.o.  male  1960     Date of Admission:7/6/2021    CC: Admitted for chest pain     History of Present Illness:   Kev Mae is a 60 year old pt with a history of CAD with prior prior coronary artery bypass grafting in 2009. He was seen in 02/2017 with chest discomfort. He was found to have a renal mass by CT imaging. He also underwent cardiac catheterization which showed an occluded LIMA graft to the LAD, mild-to-moderate disease of the native LAD with a vein graft to the right coronary artery disease and obtuse marginal branch that was patent. He was treated medically. He subsequently underwent laparoscopic radical nephrectomy with sigmoid resection of the mass. 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     I last saw pt on 7/17/19 for follow up. Pt was walking 900 steps at work almost daily at work. Pt denied any chest pain, shortness of breath, palpitations, syncope or near syncope. BP at home was slightly higher diastolic pressures. shortness of breath, palpitations, syncope near syncope.  Blood pressure at home has shown slightly higher diastolic pressures.  He was not on a strict low-salt diet.  He did have an EGD for dysphagia and this improved with decrease caffeine intake.      He presented to [facility] yesterday with chest pain.  He states he had been outside playing golf and been feeling well.  Once he returned home he sat down with his family and was having a beer.  He developed severe 9-10 out of 10 chest discomfort and extreme weakness as well as diaphoresis.  He slumped forward onto the table but did not pass out though was not fully responsive to commands by family members.  He eventually came to and was given sublingual nitroglycerin tablet with  ongoing complaints of chest discomfort.  This led to resolution of his symptoms he started to feel better.  His family took him to the emergency room in route it appeared he had another milder briefer episode of chest pain.  His blood pressure was initially slightly elevated though following nitroglycerin and improved and was borderline low.  He is not had any recurrent chest pain since then.  His troponin was unremarkable.  EKG presumably was normal though did not make it to the chart and transfer as best I can tell.  EKG here is unremarkable.  Troponin here is also unremarkable.  Hemoglobin white count are fine.  Creatinine elevated 1.4 in the setting of single kidney.    He has not complaints of chest pain through night . Troponin at [facility] was negative and troponin here pending.     CATH 2/10/2017    SUMMARY: Chronic CAD, slightly improved from 2015.     RECOMMENDATIONS: Continue medical management    I personally viewed and interpreted the patient's EKG/Telemetry data    ECHO 3/18/2014        Past Medical History:  Past Medical History:   Diagnosis Date   • Bloody stool 04/2017   • CAD (coronary artery disease)     CABG IN THE PAST   • Colon polyps     FOLLOWED BY DR. BIANKA SAUNDERS   • Colonic mass 05/25/2017    SESSILE SERRATED ADENOMA   • Diaphoresis    • Diplopia    • Essential hypertension    • GI hemorrhage 02/09/2017    ADMITTED TO [facility]   • H. pylori infection 06/2017    [facility] in [place]   • Hyperlipidemia    • Hypertension    • Myocardial infarction (CMS/[facility]) 05/25/2009    INFERIOR MI, ADMITTED TO [place]   • Nausea    • Precordial pain 02/2017   • PVC (premature ventricular contraction)    • PVD (peripheral vascular disease) (CMS/[facility])    • Renal cancer (CMS/[facility]) 03/08/2017    LEFT, S/P NEPHRECTOMY, FOLLOWED BY DR. LA IYER   • SOB (shortness of breath)        Past Surgical History:  Past Surgical History:   Procedure Laterality Date   • AMPUTATION DIGIT Right 04/09/2017    RIGHT RING FINGER    • CARDIAC CATHETERIZATION N/A 2/10/2017    Procedure: Left Heart Cath;  Surgeon: Leland Carpio MD;  Location: [facility] CATH INVASIVE LOCATION;  Service:    • CARDIAC CATHETERIZATION N/A 2009    DR.MINI CALL   • CARDIAC CATHETERIZATION N/A 08/26/2015    DR.WILLIAM PETERSON   • CARDIAC CATHETERIZATION N/A 06/28/2012    DR.WILLIAM PETERSON   • COLON RESECTION N/A 5/25/2017    Procedure: LAPAROSCOPIC SIGMOID COLON RESECTION, MOBILIZATION OF SPLENIC FLEXURE;  Surgeon: Bianka Tang MD;  Location: [facility] OR;  Service:    • COLONOSCOPY N/A 4/3/2017    MEDIUM INTERNAL HEMORRHOIDS, DIVERTICULOSIS IN SIGMOID, 4 MM TUBULAR ADENOMA POLYP IN TRANSVERSE, 5 MM HYPERPLASTIC POLYP IN TRANSVERSE, NODULAR MUCOSA IN RECTUM, 4 HYPERPLASTIC POLYPS IN RECTUM, DR. FLORIN PERKINS AT [facility]   • COLONOSCOPY N/A 03/03/2015   • COLONOSCOPY W/ POLYPECTOMY N/A 08/06/2012    POLYPS REMOVED   • CORONARY ARTERY BYPASS GRAFT N/A 05/25/2009    3 VESSEL, DONE AT [place]   • INGUINAL HERNIA REPAIR Left 12/29/2016    DR. LUCY KENNY AT [facility]   • NEPHRECTOMY Left 5/25/2017    Procedure: LAPAROSCOPIC LEFT NEPHRECTOMY ;  Surgeon: Dima Thompson MD;  Location: [facility] OR;  Service:    • SHOULDER ARTHROSCOPY W/ ROTATOR CUFF REPAIR Left 06/20/2015   • SIGMOIDOSCOPY N/A 4/21/2017    AREA OF DISTAL SIGMOID TATTOOED, AN ULCERATED POLYPOID MASS IN DISTAL SIGMOID, NO SPECIMENS COLLECTED, DR. BIANKA TANG AT [facility]   • VASECTOMY N/A     HAD 2 SURGERIES       Allergies:  No Known Allergies    Home Meds:  Medications Prior to Admission   Medication Sig Dispense Refill Last Dose   • aspirin 81 MG tablet Take 81 mg by mouth Daily. TO STOP 5 DAYS BEFORE SURGERY      • folic acid (FOLVITE) 400 MCG tablet Take 400 mcg by mouth Daily.      • isosorbide mononitrate (IMDUR) 30 MG 24 hr tablet Take 1 tablet by mouth Every Night. 14 tablet 0    • metoprolol succinate XL (TOPROL XL) 50 MG 24 hr tablet Take 1 tablet by mouth Every Night. (Patient taking differently: Take 50 mg  by mouth Daily.) 14 tablet 0    • MULTIPLE VITAMIN PO Take 1 tablet by mouth Every Evening. TO STOP 5 DAYS BEFORE SURGERY      • simvastatin (ZOCOR) 20 MG tablet Take 20 mg by mouth Daily.          Current Meds  Scheduled Meds:   Continuous Infusions:   PRN Meds:.•  nitroglycerin    Social History:   Social History     Socioeconomic History   • Marital status:      Spouse name: Not on file   • Number of children: Not on file   • Years of education: Not on file   • Highest education level: Not on file   Tobacco Use   • Smoking status: Former Smoker     Packs/day: 2.00     Years: 32.00     Pack years: 64.00     Types: Cigarettes     Quit date: 2009     Years since quittin.1   • Smokeless tobacco: Never Used   • Tobacco comment: Daily caffeine use   Substance and Sexual Activity   • Alcohol use: Yes     Comment: social drinker   • Drug use: No   • Sexual activity: Defer       Family History:  Family History   Problem Relation Age of Onset   • Heart attack Mother    • Leukemia Mother    • Cancer Mother    • Heart attack Brother    • Coronary artery disease Brother    • Heart disease Brother    • Deep vein thrombosis Cousin         with embolic death   • Coronary artery disease Father    • Heart disease Father    • COPD Father    • No Known Problems Sister    • No Known Problems Brother        REVIEW OF SYSTEMS:   CONSTITUTIONAL: No weight loss, fever, chills, weakness or fatigue.   HEENT: Eyes: No visual loss, blurred vision, double vision or yellow sclerae. Ears, Nose, Throat: No hearing loss, sneezing, congestion, runny nose or sore throat.   SKIN: No rash or itching.     RESPIRATORY: No  hemoptysis, cough or sputum.   GASTROINTESTINAL: No anorexia, nausea, vomiting or diarrhea. No abdominal pain, bright red blood per rectum or melena.  GENITOURINARY: No burning on urination, hematuria or increased frequency.  NEUROLOGICAL: No headache, dizziness, syncope, paralysis, ataxia, numbness or tingling in  "the extremities. No change in bowel or bladder control.   MUSCULOSKELETAL: No muscle, back pain, joint pain or stiffness.   HEMATOLOGIC: No anemia, bleeding or bruising.   PSYCHIATRIC: No history of depression, anxiety, hallucinations.   ENDOCRINOLOGIC: No reports of sweating, cold or heat intolerance. No polyuria or polydipsia.     Physical Exam    /89 (BP Location: Right arm, Patient Position: Sitting)   Pulse 64   Temp 97.7 °F (36.5 °C) (Oral)   Resp 16   Ht 177.8 cm (70\")   Wt 99 kg (218 lb 3.2 oz)   SpO2 94%   BMI 31.31 kg/m²     General Appearance Well developed, cooperative and well nourished and no acute distress   Head Normocephalic, without abnormality, atraumatic   Ears Ears appear intact with no abnormalities noted   Throat No oral lesions, no thrush, oral mucosa moist   Neck No adenopathy, supple, trachea midline, no thyromegaly, no carotid bruit, no JVD   Back No skin lesions, erythema or scars, no tenderness to percussion or palpation,range of motion is normal   Lungs Clear to auscultation,respirations regular, even and unlabored   Heart Regular rhythm and normal rate, normal S1 and S2, no murmur, no gallop, no rub, no click   Chest wall No abnormalities observed   Abdomen Normal bowel sounds, no masses, no hepatomegaly,    Extremities Moves all extremities well, no edema, no cyanosis, no redness   Pulses Pulses palpable and equal bilaterally. Normal radial, carotid, femoral, dorsalis pedis and posterior tibial pulses bilaterally. Normal abdominal aorta   Skin No bleeding, bruising or rash   Psychiatric Alert and oriented x 3, normal mood and affect     Labs:  Repeat troponin negative.  EKG yesterday at [facility] with sinus bradycardia, PVCs                  Assessment and Plan  1.  Chest pain.  Troponins are negative though chest pain was quite intense and severe associated with extreme weakness.  Ideally would favor cardiac catheterization but he has renal insufficiency with single " kidney.  With this in mind we will await a.m. labs and make final decision.  If creatinine no better, plan to proceed with exercise Cardiolite stress test.  2.  History of coronary artery disease, CABG 2009.  Recommendations as above  3.  Hypertension, overall fairly well controlled  4.  Dyslipidemia  5.  Renal insufficency, as above  6.  History of dysphagia, scope negative and resolved with decreased caffine and on prilosec  7.  Prediabetes  8.  History of renal cell carcinoma status post nephrectomy  9.  History of colonic polyp    I have reviewed HPI and ROS above.    Hina Charles  7/6/2021  06:07 EDT    50min spent in reviewing records, discussion and examination of the patient and discussion with other members of the patient's medical team.     Dictated utilizing Dragon dictation

## 2021-07-07 VITALS
TEMPERATURE: 97.8 F | OXYGEN SATURATION: 94 % | HEART RATE: 74 BPM | HEIGHT: 70 IN | RESPIRATION RATE: 16 BRPM | DIASTOLIC BLOOD PRESSURE: 73 MMHG | WEIGHT: 218.2 LBS | BODY MASS INDEX: 31.24 KG/M2 | SYSTOLIC BLOOD PRESSURE: 115 MMHG

## 2021-07-07 LAB
ACT BLD: 268 SECONDS (ref 82–152)
ANION GAP SERPL CALCULATED.3IONS-SCNC: 10.2 MMOL/L (ref 5–15)
BUN SERPL-MCNC: 21 MG/DL (ref 6–20)
BUN/CREAT SERPL: 15.6 (ref 7–25)
CALCIUM SPEC-SCNC: 9 MG/DL (ref 8.6–10.5)
CHLORIDE SERPL-SCNC: 101 MMOL/L (ref 98–107)
CO2 SERPL-SCNC: 25.8 MMOL/L (ref 22–29)
CREAT SERPL-MCNC: 1.35 MG/DL (ref 0.76–1.27)
GFR SERPL CREATININE-BSD FRML MDRD: 54 ML/MIN/1.73
GLUCOSE SERPL-MCNC: 96 MG/DL (ref 65–99)
POTASSIUM SERPL-SCNC: 3.9 MMOL/L (ref 3.5–5.2)
QT INTERVAL: 436 MS
SODIUM SERPL-SCNC: 137 MMOL/L (ref 136–145)

## 2021-07-07 PROCEDURE — 99217 PR OBSERVATION CARE DISCHARGE MANAGEMENT: CPT | Performed by: NURSE PRACTITIONER

## 2021-07-07 PROCEDURE — 93005 ELECTROCARDIOGRAM TRACING: CPT | Performed by: INTERNAL MEDICINE

## 2021-07-07 PROCEDURE — 80048 BASIC METABOLIC PNL TOTAL CA: CPT | Performed by: INTERNAL MEDICINE

## 2021-07-07 PROCEDURE — 93010 ELECTROCARDIOGRAM REPORT: CPT | Performed by: INTERNAL MEDICINE

## 2021-07-07 PROCEDURE — G0378 HOSPITAL OBSERVATION PER HR: HCPCS

## 2021-07-07 RX ORDER — ISOSORBIDE MONONITRATE 30 MG/1
30 TABLET, EXTENDED RELEASE ORAL
Qty: 90 TABLET | Refills: 2 | Status: SHIPPED | OUTPATIENT
Start: 2021-07-08 | End: 2021-07-08 | Stop reason: SDUPTHER

## 2021-07-07 RX ORDER — ATORVASTATIN CALCIUM 40 MG/1
40 TABLET, FILM COATED ORAL NIGHTLY
Qty: 90 TABLET | Refills: 1 | Status: SHIPPED | OUTPATIENT
Start: 2021-07-07 | End: 2021-07-08

## 2021-07-07 RX ORDER — NITROGLYCERIN 0.4 MG/1
0.4 TABLET SUBLINGUAL
Qty: 30 TABLET | Refills: 1 | Status: SHIPPED | OUTPATIENT
Start: 2021-07-07 | End: 2021-07-08 | Stop reason: SDUPTHER

## 2021-07-07 RX ADMIN — ISOSORBIDE MONONITRATE 30 MG: 30 TABLET ORAL at 08:36

## 2021-07-07 RX ADMIN — METOPROLOL SUCCINATE 50 MG: 50 TABLET, EXTENDED RELEASE ORAL at 08:36

## 2021-07-07 RX ADMIN — ASPIRIN 81 MG: 81 TABLET, COATED ORAL at 08:36

## 2021-07-07 NOTE — DISCHARGE SUMMARY
Date of Discharge:  7/7/2021  Date of Admit: 7/6/2021    Discharge Diagnosis:  Problems Addressed this Visit        Cardiac and Vasculature    CAD (coronary artery disease) - Primary    Relevant Medications    isosorbide mononitrate (IMDUR) 30 MG 24 hr tablet (Start on 7/8/2021)    nitroglycerin (NITROSTAT) 0.4 MG SL tablet    Other Relevant Orders    Ambulatory Referral to Cardiac Rehab    Hypertension      Diagnoses       Codes Comments    Coronary artery disease involving native coronary artery of native heart with unstable angina pectoris (CMS/[facility])    -  Primary ICD-10-CM: I25.110  ICD-9-CM: 414.01, 411.1     Essential hypertension     ICD-10-CM: I10  ICD-9-CM: 401.9           Hospital Course:     Patient presented to [facility] with chest pain.  He was transferred to [facility] and troponin was unremarkable.  His renal function was stable so we proceeded with left heart catheterization 7/6/2021. Percutaneous intervention attempt to distal right coronary artery was deferred due to inability to cross lesion with guidewire.  Medical therapy was recommended.  He was not taking isosorbide prior to admission due to intermittent use of Cialis at home.  Statin therapy was changed to atorvastatin 40 mg daily.  He was educated on avoiding Cialis with isosorbide.  Echocardiogram was arranged outpatient.        Procedures Performed  Procedure(s):  Left Heart Cath NO LV  Coronary angiography  Saphenous Vein Graft       Consults     No orders found for last 30 day(s).            Discharge Medications     Discharge Medications      New Medications      Instructions Start Date   atorvastatin 40 MG tablet  Commonly known as: LIPITOR   40 mg, Oral, Nightly      nitroglycerin 0.4 MG SL tablet  Commonly known as: NITROSTAT   0.4 mg, Sublingual, Every 5 Minutes PRN, Take no more than 3 doses in 15 minutes.         Changes to Medications      Instructions Start Date   isosorbide mononitrate 30 MG 24 hr  tablet  Commonly known as: IMDUR  What changed: when to take this   30 mg, Oral, Every 24 Hours Scheduled   Start Date: July 8, 2021     metoprolol succinate XL 50 MG 24 hr tablet  Commonly known as: Toprol XL  What changed: when to take this   50 mg, Oral, Nightly         Continue These Medications      Instructions Start Date   aspirin 81 MG tablet   81 mg, Oral, Daily, TO STOP 5 DAYS BEFORE SURGERY      folic acid 400 MCG tablet  Commonly known as: FOLVITE   400 mcg, Oral, Daily      multivitamin tablet tablet  Commonly known as: THERAGRAN   1 tablet, Oral, Every Evening, TO STOP 5 DAYS BEFORE SURGERY         Stop These Medications    simvastatin 20 MG tablet  Commonly known as: ZOCOR              Activity at Discharge:    No driving x48 hours  Avoid lifting above 10 pounds for 3 days  Resume all activity without restriction after 5-7 days    Follow-up Appointments  Additional Instructions for the Follow-ups that You Need to Schedule     Ambulatory Referral to Cardiac Rehab   As directed        Follow-up Information     [facility] .    Specialty: Cardiac Rehabilitation  Contact information:  [address] [address] [phone]           Hina Charles MD Follow up in 1 week(s).    Specialty: Cardiology  Why: 1-2 week with NP with echocardiogram  Contact information:  [address]  [facility] [id number] [phone]             Deja Jones MD .    Specialties: Family Medicine, Emergency Medicine  Contact information:  [address] [address] [address] [phone]                     DISCHARGE DISPOSITION:  Follow-up in our office in 1-2 weeks.  Will need outpatient renal function call with questions or concerns      DMIAS Klein  07/07/21  11:09 EDT

## 2021-07-07 NOTE — PROGRESS NOTES
Case Management Discharge Note      Final Note: home         Selected Continued Care - Discharged on 7/7/2021 Admission date: 7/6/2021 - Discharge disposition: Home or Self Care    Destination    No services have been selected for the patient.              Durable Medical Equipment    No services have been selected for the patient.              Dialysis/Infusion    No services have been selected for the patient.              Home Medical Care    No services have been selected for the patient.              Therapy    No services have been selected for the patient.              Community Resources    No services have been selected for the patient.              Community & DME    No services have been selected for the patient.                  Transportation Services  Private: Car    Final Discharge Disposition Code: 01 - home or self-care

## 2021-07-07 NOTE — PLAN OF CARE
Goal Outcome Evaluation:  Plan of Care Reviewed With: patient     No complaints of pain during shift. Patient on 2 L nasal cannula during night. Medication education provided.  Problem: Adult Inpatient Plan of Care  Goal: Plan of Care Review  Outcome: Ongoing, Progressing  Goal: Patient-Specific Goal (Individualized)  Outcome: Ongoing, Progressing  Goal: Absence of Hospital-Acquired Illness or Injury  Outcome: Ongoing, Progressing  Intervention: Identify and Manage Fall Risk  Recent Flowsheet Documentation  Taken 7/7/2021 0257 by Luz Riojas RN  Safety Promotion/Fall Prevention:   activity supervised   clutter free environment maintained   fall prevention program maintained   nonskid shoes/slippers when out of bed   room organization consistent   safety round/check completed  Taken 7/7/2021 0040 by Luz Riojas RN  Safety Promotion/Fall Prevention:   activity supervised   clutter free environment maintained   fall prevention program maintained   nonskid shoes/slippers when out of bed   room organization consistent   safety round/check completed  Taken 7/6/2021 2252 by Luz Riojas RN  Safety Promotion/Fall Prevention:   activity supervised   clutter free environment maintained   fall prevention program maintained   nonskid shoes/slippers when out of bed   room organization consistent   safety round/check completed  Taken 7/6/2021 2005 by Luz Riojas RN  Safety Promotion/Fall Prevention:   activity supervised   clutter free environment maintained   fall prevention program maintained   nonskid shoes/slippers when out of bed   room organization consistent   safety round/check completed  Intervention: Prevent Skin Injury  Recent Flowsheet Documentation  Taken 7/7/2021 0257 by Luz Riojas RN  Body Position:   position changed independently   tilted, left  Taken 7/7/2021 0040 by Luz Riojas RN  Body Position:   position changed independently   supine  Taken 7/6/2021 2252 by Luz Riojas RN  Body Position:    position changed independently   supine  Taken 7/6/2021 2005 by Luz Riojas RN  Body Position:   position changed independently   supine  Intervention: Prevent and Manage VTE (venous thromboembolism) Risk  Recent Flowsheet Documentation  Taken 7/7/2021 0040 by Luz Riojas RN  VTE Prevention/Management:   bilateral   dorsiflexion/plantar flexion performed   bleeding risk factor(s) identified  Taken 7/6/2021 2005 by Luz Riojas RN  VTE Prevention/Management:   bilateral   dorsiflexion/plantar flexion performed   bleeding risk factor(s) identified  Intervention: Prevent Infection  Recent Flowsheet Documentation  Taken 7/7/2021 0257 by Luz Riojas RN  Infection Prevention:   hand hygiene promoted   personal protective equipment utilized   single patient room provided   rest/sleep promoted   visitors restricted/screened  Taken 7/7/2021 0040 by Luz Riojas RN  Infection Prevention:   hand hygiene promoted   personal protective equipment utilized   rest/sleep promoted   single patient room provided   visitors restricted/screened  Taken 7/6/2021 2252 by Luz Riojas RN  Infection Prevention:   hand hygiene promoted   personal protective equipment utilized   rest/sleep promoted   single patient room provided   visitors restricted/screened  Taken 7/6/2021 2005 by Luz Riojas RN  Infection Prevention:   hand hygiene promoted   personal protective equipment utilized   rest/sleep promoted   single patient room provided   visitors restricted/screened  Goal: Optimal Comfort and Wellbeing  Outcome: Ongoing, Progressing  Intervention: Provide Person-Centered Care  Recent Flowsheet Documentation  Taken 7/7/2021 0040 by Luz Riojas RN  Trust Relationship/Rapport:   care explained   choices provided  Taken 7/6/2021 2005 by Luz Riojas RN  Trust Relationship/Rapport:   care explained   choices provided  Goal: Readiness for Transition of Care  Outcome: Ongoing, Progressing     Problem: Skin Injury Risk Increased  Goal:  Skin Health and Integrity  Outcome: Ongoing, Progressing  Intervention: Optimize Skin Protection  Recent Flowsheet Documentation  Taken 7/7/2021 0257 by Luz Riojas RN  Head of Bed (HOB): HOB at 20-30 degrees  Taken 7/7/2021 0040 by Luz Riojas RN  Head of Bed (HOB): HOB at 20-30 degrees  Taken 7/6/2021 2252 by Luz Riojas RN  Head of Bed (HOB): HOB at 20-30 degrees  Taken 7/6/2021 2005 by Luz Riojas RN  Head of Bed (HOB): HOB at 20-30 degrees     Problem: Hypertension Comorbidity  Goal: Blood Pressure in Desired Range  Outcome: Ongoing, Progressing  Intervention: Maintain Hypertension-Management Strategies  Recent Flowsheet Documentation  Taken 7/7/2021 0257 by Luz Riojas RN  Medication Review/Management: medications reviewed  Taken 7/7/2021 0040 by Luz Riojas RN  Medication Review/Management: medications reviewed  Taken 7/6/2021 2252 by Luz Riojas RN  Medication Review/Management: medications reviewed  Taken 7/6/2021 2005 by Luz Riojas RN  Medication Review/Management: medications reviewed

## 2021-07-07 NOTE — PLAN OF CARE
Problem: Adult Inpatient Plan of Care  Goal: Plan of Care Review  Outcome: Ongoing, Progressing  Flowsheets  Taken 7/7/2021 1311 by Dez Kuhn, RN  Plan of Care Reviewed With:   patient   spouse  Taken 7/6/2021 0323 by Luz Riojas, RN  Progress: improving  Goal: Patient-Specific Goal (Individualized)  Outcome: Ongoing, Progressing  Goal: Absence of Hospital-Acquired Illness or Injury  Outcome: Ongoing, Progressing  Goal: Optimal Comfort and Wellbeing  Outcome: Ongoing, Progressing  Goal: Readiness for Transition of Care  Outcome: Ongoing, Progressing     Problem: Skin Injury Risk Increased  Goal: Skin Health and Integrity  Outcome: Ongoing, Progressing     Problem: Hypertension Comorbidity  Goal: Blood Pressure in Desired Range  Outcome: Ongoing, Progressing   Goal Outcome Evaluation:  Plan of Care Reviewed With: patient, spouse

## 2021-07-08 ENCOUNTER — TELEPHONE (OUTPATIENT)
Dept: CARDIOLOGY | Facility: CLINIC | Age: 61
End: 2021-07-08

## 2021-07-08 ENCOUNTER — TELEPHONE (OUTPATIENT)
Dept: CARDIAC REHAB | Facility: HOSPITAL | Age: 61
End: 2021-07-08

## 2021-07-08 RX ORDER — NITROGLYCERIN 0.4 MG/1
0.4 TABLET SUBLINGUAL
Qty: 30 TABLET | Refills: 1 | Status: SHIPPED | OUTPATIENT
Start: 2021-07-08

## 2021-07-08 RX ORDER — ISOSORBIDE MONONITRATE 30 MG/1
30 TABLET, EXTENDED RELEASE ORAL
Qty: 30 TABLET | Refills: 2 | Status: SHIPPED | OUTPATIENT
Start: 2021-07-08 | End: 2021-11-03 | Stop reason: SDUPTHER

## 2021-07-08 RX ORDER — ATORVASTATIN CALCIUM 40 MG/1
40 TABLET, FILM COATED ORAL NIGHTLY
Qty: 30 TABLET | Refills: 11 | Status: SHIPPED | OUTPATIENT
Start: 2021-07-08 | End: 2021-11-03 | Stop reason: SDUPTHER

## 2021-07-08 NOTE — TELEPHONE ENCOUNTER
Dr. Shelton placed cardiac rehab referral after cath. Please use stable angina as diagnosis and arrange initial assessment at [facility].

## 2021-07-08 NOTE — TELEPHONE ENCOUNTER
Patient left another message stating that the medications which were called in for him by AL while in the hospital were sent to [facility] and he would like for them to go to the [facility] in [facility]

## 2021-07-08 NOTE — TELEPHONE ENCOUNTER
NICHOLASD patient.     Called and S/w patient. Plan to return on Monday. His wife needs a work note to return Monday as she has been off to be support for patient. Agree to compose letter for both to return to work Monday with no restrictions.     He states he's tried calling scheduling to schedule follow up in 1-2 weeks. He also needs echo same day as that appt. Please reach out to him

## 2021-07-08 NOTE — TELEPHONE ENCOUNTER
Patient calling needing a note to return to work. He had a left heart cath on 7/6/2021. Patient works at the [employer] office and does not do strenuous work. He is a [facility] patient, however he has not been seen since 7/17/20219. Dr Weeks saw patient while he was admitted to [facility].           Patient presented to [facility] with chest pain.  He was transferred to [facility] and troponin was unremarkable.  His renal function was stable so we proceeded with left heart catheterization 7/6/2021. Percutaneous intervention attempt to distal right coronary artery was deferred due to inability to cross lesion with guidewire.  Medical therapy was recommended.  He was not taking isosorbide prior to admission due to intermittent use of Cialis at home.  Statin therapy was changed to atorvastatin 40 mg daily.  He was educated on avoiding Cialis with isosorbide.  Echocardiogram was arranged outpatient.

## 2021-07-22 ENCOUNTER — HOSPITAL ENCOUNTER (OUTPATIENT)
Dept: CARDIOLOGY | Facility: HOSPITAL | Age: 61
Discharge: HOME | End: 2021-07-22

## 2021-07-22 ENCOUNTER — OFFICE VISIT (OUTPATIENT)
Dept: CARDIOLOGY | Facility: CLINIC | Age: 61
End: 2021-07-22

## 2021-07-22 ENCOUNTER — LAB (OUTPATIENT)
Dept: LAB | Facility: HOSPITAL | Age: 61
End: 2021-07-22

## 2021-07-22 VITALS
HEART RATE: 58 BPM | HEIGHT: 70 IN | WEIGHT: 223 LBS | BODY MASS INDEX: 31.92 KG/M2 | OXYGEN SATURATION: 98 % | DIASTOLIC BLOOD PRESSURE: 80 MMHG | SYSTOLIC BLOOD PRESSURE: 120 MMHG

## 2021-07-22 VITALS
SYSTOLIC BLOOD PRESSURE: 120 MMHG | WEIGHT: 223 LBS | DIASTOLIC BLOOD PRESSURE: 80 MMHG | HEIGHT: 70 IN | OXYGEN SATURATION: 98 % | HEART RATE: 58 BPM | BODY MASS INDEX: 31.92 KG/M2

## 2021-07-22 DIAGNOSIS — E78.5 DYSLIPIDEMIA: ICD-10-CM

## 2021-07-22 DIAGNOSIS — I10 ESSENTIAL HYPERTENSION: ICD-10-CM

## 2021-07-22 DIAGNOSIS — I25.110 CORONARY ARTERY DISEASE INVOLVING NATIVE CORONARY ARTERY OF NATIVE HEART WITH UNSTABLE ANGINA PECTORIS (HCC): ICD-10-CM

## 2021-07-22 DIAGNOSIS — R06.81 WITNESSED EPISODE OF APNEA: ICD-10-CM

## 2021-07-22 DIAGNOSIS — I25.10 CORONARY ARTERY DISEASE INVOLVING NATIVE CORONARY ARTERY OF NATIVE HEART WITHOUT ANGINA PECTORIS: Primary | ICD-10-CM

## 2021-07-22 LAB
ANION GAP SERPL CALCULATED.3IONS-SCNC: 6.1 MMOL/L (ref 5–15)
AORTIC ARCH: 3.1 CM
ASCENDING AORTA: 3.1 CM
BH CV ECHO MEAS - ACS: 2.4 CM
BH CV ECHO MEAS - AO ARCH DIAM (PROXIMAL TRANS.): 3.1 CM
BH CV ECHO MEAS - AO MAX PG (FULL): 1.2 MMHG
BH CV ECHO MEAS - AO MAX PG: 4.8 MMHG
BH CV ECHO MEAS - AO MEAN PG (FULL): 0.38 MMHG
BH CV ECHO MEAS - AO MEAN PG: 2.3 MMHG
BH CV ECHO MEAS - AO ROOT AREA (BSA CORRECTED): 1.5
BH CV ECHO MEAS - AO ROOT AREA: 8 CM^2
BH CV ECHO MEAS - AO ROOT DIAM: 3.2 CM
BH CV ECHO MEAS - AO V2 MAX: 109.8 CM/SEC
BH CV ECHO MEAS - AO V2 MEAN: 69.5 CM/SEC
BH CV ECHO MEAS - AO V2 VTI: 26.2 CM
BH CV ECHO MEAS - ASC AORTA: 3.1 CM
BH CV ECHO MEAS - AVA(I,A): 4 CM^2
BH CV ECHO MEAS - AVA(I,D): 4 CM^2
BH CV ECHO MEAS - AVA(V,A): 3.7 CM^2
BH CV ECHO MEAS - AVA(V,D): 3.7 CM^2
BH CV ECHO MEAS - BSA(HAYCOCK): 2.3 M^2
BH CV ECHO MEAS - BSA: 2.2 M^2
BH CV ECHO MEAS - BZI_BMI: 32 KILOGRAMS/M^2
BH CV ECHO MEAS - BZI_METRIC_HEIGHT: 177.8 CM
BH CV ECHO MEAS - BZI_METRIC_WEIGHT: 101.2 KG
BH CV ECHO MEAS - EDV(MOD-SP2): 110 ML
BH CV ECHO MEAS - EDV(MOD-SP4): 97 ML
BH CV ECHO MEAS - EDV(TEICH): 159.7 ML
BH CV ECHO MEAS - EF(CUBED): 66.5 %
BH CV ECHO MEAS - EF(MOD-BP): 55.3 %
BH CV ECHO MEAS - EF(MOD-SP2): 52.7 %
BH CV ECHO MEAS - EF(MOD-SP4): 58.8 %
BH CV ECHO MEAS - EF(TEICH): 57.3 %
BH CV ECHO MEAS - ESV(MOD-SP2): 52 ML
BH CV ECHO MEAS - ESV(MOD-SP4): 40 ML
BH CV ECHO MEAS - ESV(TEICH): 68.2 ML
BH CV ECHO MEAS - FS: 30.5 %
BH CV ECHO MEAS - IVS/LVPW: 0.75
BH CV ECHO MEAS - IVSD: 0.85 CM
BH CV ECHO MEAS - LAT PEAK E' VEL: 9 CM/SEC
BH CV ECHO MEAS - LV DIASTOLIC VOL/BSA (35-75): 44.4 ML/M^2
BH CV ECHO MEAS - LV MASS(C)D: 221.4 GRAMS
BH CV ECHO MEAS - LV MASS(C)DI: 101.3 GRAMS/M^2
BH CV ECHO MEAS - LV MAX PG: 3.6 MMHG
BH CV ECHO MEAS - LV MEAN PG: 1.9 MMHG
BH CV ECHO MEAS - LV SYSTOLIC VOL/BSA (12-30): 18.3 ML/M^2
BH CV ECHO MEAS - LV V1 MAX: 95.1 CM/SEC
BH CV ECHO MEAS - LV V1 MEAN: 64.6 CM/SEC
BH CV ECHO MEAS - LV V1 VTI: 24.1 CM
BH CV ECHO MEAS - LVIDD: 5.7 CM
BH CV ECHO MEAS - LVIDS: 4 CM
BH CV ECHO MEAS - LVLD AP2: 8.3 CM
BH CV ECHO MEAS - LVLD AP4: 8 CM
BH CV ECHO MEAS - LVLS AP2: 7.2 CM
BH CV ECHO MEAS - LVLS AP4: 6.8 CM
BH CV ECHO MEAS - LVOT AREA (M): 4.2 CM^2
BH CV ECHO MEAS - LVOT AREA: 4.3 CM^2
BH CV ECHO MEAS - LVOT DIAM: 2.3 CM
BH CV ECHO MEAS - LVPWD: 1.1 CM
BH CV ECHO MEAS - MED PEAK E' VEL: 11.3 CM/SEC
BH CV ECHO MEAS - MR MAX PG: 53.5 MMHG
BH CV ECHO MEAS - MR MAX VEL: 365.8 CM/SEC
BH CV ECHO MEAS - MV A DUR: 0.14 SEC
BH CV ECHO MEAS - MV A MAX VEL: 50.4 CM/SEC
BH CV ECHO MEAS - MV DEC SLOPE: 146.6 CM/SEC^2
BH CV ECHO MEAS - MV DEC TIME: 0.28 SEC
BH CV ECHO MEAS - MV E MAX VEL: 71.8 CM/SEC
BH CV ECHO MEAS - MV E/A: 1.4
BH CV ECHO MEAS - MV MAX PG: 2.5 MMHG
BH CV ECHO MEAS - MV MEAN PG: 0.99 MMHG
BH CV ECHO MEAS - MV P1/2T MAX VEL: 86.9 CM/SEC
BH CV ECHO MEAS - MV P1/2T: 173.6 MSEC
BH CV ECHO MEAS - MV V2 MAX: 79.6 CM/SEC
BH CV ECHO MEAS - MV V2 MEAN: 46.2 CM/SEC
BH CV ECHO MEAS - MV V2 VTI: 37.7 CM
BH CV ECHO MEAS - MVA P1/2T LCG: 2.5 CM^2
BH CV ECHO MEAS - MVA(P1/2T): 1.3 CM^2
BH CV ECHO MEAS - MVA(VTI): 2.8 CM^2
BH CV ECHO MEAS - PA ACC TIME: 0.24 SEC
BH CV ECHO MEAS - PA MAX PG (FULL): 3.8 MMHG
BH CV ECHO MEAS - PA MAX PG: 4.8 MMHG
BH CV ECHO MEAS - PA PR(ACCEL): -28 MMHG
BH CV ECHO MEAS - PA V2 MAX: 109.9 CM/SEC
BH CV ECHO MEAS - PI END-D VEL: 92.3 CM/SEC
BH CV ECHO MEAS - PULM A REVS DUR: 0.23 SEC
BH CV ECHO MEAS - PULM A REVS VEL: 27.5 CM/SEC
BH CV ECHO MEAS - PULM DIAS VEL: 58.3 CM/SEC
BH CV ECHO MEAS - PULM S/D: 0.77
BH CV ECHO MEAS - PULM SYS VEL: 45 CM/SEC
BH CV ECHO MEAS - PVA(V,A): 2.1 CM^2
BH CV ECHO MEAS - PVA(V,D): 2.1 CM^2
BH CV ECHO MEAS - QP/QS: 0.65
BH CV ECHO MEAS - RAP SYSTOLE: 3 MMHG
BH CV ECHO MEAS - RV MAX PG: 1.1 MMHG
BH CV ECHO MEAS - RV MEAN PG: 0.58 MMHG
BH CV ECHO MEAS - RV V1 MAX: 51.8 CM/SEC
BH CV ECHO MEAS - RV V1 MEAN: 35.3 CM/SEC
BH CV ECHO MEAS - RV V1 VTI: 14.9 CM
BH CV ECHO MEAS - RVOT AREA: 4.5 CM^2
BH CV ECHO MEAS - RVOT DIAM: 2.4 CM
BH CV ECHO MEAS - RVSP: 17 MMHG
BH CV ECHO MEAS - SI(AO): 96.1 ML/M^2
BH CV ECHO MEAS - SI(CUBED): 56.2 ML/M^2
BH CV ECHO MEAS - SI(LVOT): 47.6 ML/M^2
BH CV ECHO MEAS - SI(MOD-SP2): 26.5 ML/M^2
BH CV ECHO MEAS - SI(MOD-SP4): 26.1 ML/M^2
BH CV ECHO MEAS - SI(TEICH): 41.9 ML/M^2
BH CV ECHO MEAS - SUP REN AO DIAM: 2.1 CM
BH CV ECHO MEAS - SV(AO): 210.2 ML
BH CV ECHO MEAS - SV(CUBED): 122.8 ML
BH CV ECHO MEAS - SV(LVOT): 104.2 ML
BH CV ECHO MEAS - SV(MOD-SP2): 58 ML
BH CV ECHO MEAS - SV(MOD-SP4): 57 ML
BH CV ECHO MEAS - SV(RVOT): 67.6 ML
BH CV ECHO MEAS - SV(TEICH): 91.5 ML
BH CV ECHO MEAS - TAPSE (>1.6): 1.6 CM
BH CV ECHO MEAS - TR MAX VEL: 186 CM/SEC
BH CV ECHO MEASUREMENTS AVERAGE E/E' RATIO: 7.07
BH CV XLRA - RV BASE: 3.9 CM
BH CV XLRA - RV LENGTH: 7.6 CM
BH CV XLRA - TDI S': 10.4 CM/SEC
BUN SERPL-MCNC: 13 MG/DL (ref 6–20)
BUN/CREAT SERPL: 10.2 (ref 7–25)
CALCIUM SPEC-SCNC: 9.3 MG/DL (ref 8.6–10.5)
CHLORIDE SERPL-SCNC: 105 MMOL/L (ref 98–107)
CO2 SERPL-SCNC: 28.9 MMOL/L (ref 22–29)
CREAT SERPL-MCNC: 1.27 MG/DL (ref 0.76–1.27)
GFR SERPL CREATININE-BSD FRML MDRD: 58 ML/MIN/1.73
GLUCOSE SERPL-MCNC: 83 MG/DL (ref 65–99)
LEFT ATRIUM VOLUME INDEX: 31 ML/M2
MAXIMAL PREDICTED HEART RATE: 162 BPM
POTASSIUM SERPL-SCNC: 4.6 MMOL/L (ref 3.5–5.2)
SINUS: 2.8 CM
SODIUM SERPL-SCNC: 140 MMOL/L (ref 136–145)
STJ: 2.7 CM
STRESS TARGET HR: 138 BPM

## 2021-07-22 PROCEDURE — 93000 ELECTROCARDIOGRAM COMPLETE: CPT | Performed by: NURSE PRACTITIONER

## 2021-07-22 PROCEDURE — 93306 TTE W/DOPPLER COMPLETE: CPT

## 2021-07-22 PROCEDURE — 80048 BASIC METABOLIC PNL TOTAL CA: CPT | Performed by: NURSE PRACTITIONER

## 2021-07-22 PROCEDURE — 36415 COLL VENOUS BLD VENIPUNCTURE: CPT | Performed by: NURSE PRACTITIONER

## 2021-07-22 PROCEDURE — 93306 TTE W/DOPPLER COMPLETE: CPT | Performed by: INTERNAL MEDICINE

## 2021-07-22 PROCEDURE — 99214 OFFICE O/P EST MOD 30 MIN: CPT | Performed by: NURSE PRACTITIONER

## 2021-07-22 NOTE — PROGRESS NOTES
Date of Office Visit: 2021  Encounter Provider: Sunshine Webster, ISIDORO, APRN  Place of Service: [facility] CARDIOLOGY  Patient Name: Kev Mae  :1960        Subjective:     Chief Complaint:  Coronary artery disease, hypertension, hospital discharge follow-up      History of Present Illness:  Kev Mae is a 60 y.o. male patient of Dr. Charles.  I am seeing this patient in the office today and I reviewed his records.    Patient has a history of coronary artery disease, CABG , hypertension, hyperlipidemia, renal cell carcinoma status post resection, colon polyp.     Patient has a history of CAD with prior CABG in .  He was seen 2017 for chest discomfort.  He was found to have renal mass by CT imaging.  He underwent heart catheterization which showed occluded LIMA graft to LAD, mild to moderate disease of native LAD, vein graft to RCA and patent obtuse marginal branch.  He was treated medically.  He subsequently underwent laparoscopic radical nephrectomy with sigmoid resection of the mass.  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     He was last seen in the office 2019 by Dr. Charles before presenting to [facility] 2021 with chest pain.  He had been playing golf and feeling well and returned home and was having a beer and developed severe chest discomfort with extreme weakness and diaphoresis.  He some forward on the table but did not pass out though was not felt to be fully responsive to commands by family members.  He eventually improved and was given sublingual nitro with ongoing complaints of chest discomfort which led to resolution of symptoms and he felt better.  His family took him to the ER after he had another milder episode of chest discomfort.  Blood pressure was initially slightly elevated the following nitroglycerin improved and was borderline low.  Troponin was unremarkable.  EKG was  presumably normal though was not transferred with him from [facility] to [facility].  Troponin at [facility] was unremarkable and hemoglobin and white count were okay.  Creatinine was 1.4 with single kidney.  He underwent heart cath 7/6/2021 at which point he was noted to have multivessel coronary artery disease with sequential 50% proximal and mid LAD stenoses, circumflex with 99% proximal stenosis, however SVG to mid marginal was patent, RCA contained a complex 99% distal RCA felt to likely be behaving like a recannulized chronic total occlusion with ACE II flow.  LV filling pressures were normal.  Attempts were made to cross the distal RCA but was meeting resistance and felt that likely would lead to dissection if continued.  Medical management was recommended and coronary disease was felt to be essentially unchanged from reports back to 2015 and similar to 2017 heart cath.  Statin was uptitrated and he was restarted on isosorbide.  Plan was for echo as an outpatient.      Patient presents to office today for follow-up appointment.  Significant other is with patient in the office today, per patient preference.  Patient reports he is feeling really good since hospital discharge.  He has stopped the Cialis and has been on the Imdur since hospital discharge.  He denies any chest pain or discomfort symptoms or shortness of breath or shortness of breath with exertion since being on the Imdur.  Denies any palpitations, racing heartbeat sensation, lower extremity edema, dizziness, syncope, near syncope, falls, or abnormal bleeding.  He does have a history of snoring with witnessed apnea and is amenable to home sleep study.  If unable to schedule the side of the [place] office then will send to see a sleep medicine provider near [place].  Has not been checking blood pressure at home but does have a cuff.          Past Medical History:   Diagnosis Date   • Bloody stool 04/2017   • CAD  (coronary artery disease)     CABG IN THE PAST   • Colon polyps     FOLLOWED BY DR. BIANKA TANG   • Colonic mass 05/25/2017    SESSILE SERRATED ADENOMA   • Diaphoresis    • Diplopia    • Essential hypertension    • GI hemorrhage 02/09/2017    ADMITTED TO [facility]   • H. pylori infection 06/2017    [facility] [place]   • Hyperlipidemia    • Hypertension    • Myocardial infarction (CMS/HCC) 05/25/2009    INFERIOR MI, ADMITTED TO [facility]   • Nausea    • Precordial pain 02/2017   • PVC (premature ventricular contraction)    • PVD (peripheral vascular disease) (CMS/HCC)    • Renal cancer (CMS/HCC) 03/08/2017    LEFT, S/P NEPHRECTOMY, FOLLOWED BY DR. LA IYER   • SOB (shortness of breath)      Past Surgical History:   Procedure Laterality Date   • AMPUTATION DIGIT Right 04/09/2017    RIGHT RING FINGER   • CARDIAC CATHETERIZATION N/A 2/10/2017    Procedure: Left Heart Cath;  Surgeon: Leland Carpio MD;  Location: [facility] CATH INVASIVE LOCATION;  Service:    • CARDIAC CATHETERIZATION N/A 2009    DR.MINI CALL   • CARDIAC CATHETERIZATION N/A 08/26/2015    DR.WILLIAM PETERSON   • CARDIAC CATHETERIZATION N/A 06/28/2012    DR.WILLIAM PETERSON   • CARDIAC CATHETERIZATION N/A 7/6/2021    Procedure: Left Heart Cath NO LV;  Surgeon: Александр Shelton MD;  Location:  [facility] CATH INVASIVE LOCATION;  Service: Cardiovascular;  Laterality: N/A;  no LV gram   • CARDIAC CATHETERIZATION N/A 7/6/2021    Procedure: Coronary angiography;  Surgeon: Александр Shelton MD;  Location:  [facility] CATH INVASIVE LOCATION;  Service: Cardiovascular;  Laterality: N/A;   • CARDIAC CATHETERIZATION  7/6/2021    Procedure: Saphenous Vein Graft;  Surgeon: Александр Shelton MD;  Location:  [facility] CATH INVASIVE LOCATION;  Service: Cardiovascular;;   • COLON RESECTION N/A 5/25/2017    Procedure: LAPAROSCOPIC SIGMOID COLON RESECTION, MOBILIZATION OF SPLENIC FLEXURE;  Surgeon: Bianka Tang MD;  Location: [facility] MAIN OR;  Service:    • COLONOSCOPY N/A 4/3/2017    MEDIUM  INTERNAL HEMORRHOIDS, DIVERTICULOSIS IN SIGMOID, 4 MM TUBULAR ADENOMA POLYP IN TRANSVERSE, 5 MM HYPERPLASTIC POLYP IN TRANSVERSE, NODULAR MUCOSA IN RECTUM, 4 HYPERPLASTIC POLYPS IN RECTUM, DR. FLORIN PERKINS AT [facility]   • COLONOSCOPY N/A 03/03/2015   • COLONOSCOPY W/ POLYPECTOMY N/A 08/06/2012    POLYPS REMOVED   • CORONARY ARTERY BYPASS GRAFT N/A 05/25/2009    3 VESSEL, DONE AT [place]   • INGUINAL HERNIA REPAIR Left 12/29/2016    DR. LUCY KENNY AT [facility]   • NEPHRECTOMY Left 5/25/2017    Procedure: LAPAROSCOPIC LEFT NEPHRECTOMY ;  Surgeon: Dima Thompson MD;  Location: [facility] OR;  Service:    • SHOULDER ARTHROSCOPY W/ ROTATOR CUFF REPAIR Left 06/20/2015   • SIGMOIDOSCOPY N/A 4/21/2017    AREA OF DISTAL SIGMOID TATTOOED, AN ULCERATED POLYPOID MASS IN DISTAL SIGMOID, NO SPECIMENS COLLECTED, DR. BIANKA SAUNDERS AT [facility]   • VASECTOMY N/A     HAD 2 SURGERIES     Outpatient Medications Prior to Visit   Medication Sig Dispense Refill   • aspirin 81 MG tablet Take 81 mg by mouth Daily. TO STOP 5 DAYS BEFORE SURGERY     • atorvastatin (LIPITOR) 40 MG tablet Take 1 tablet by mouth Every Night. 30 tablet 11   • folic acid (FOLVITE) 400 MCG tablet Take 400 mcg by mouth Daily.     • isosorbide mononitrate (IMDUR) 30 MG 24 hr tablet Take 1 tablet by mouth Daily. 30 tablet 2   • metoprolol succinate XL (TOPROL XL) 50 MG 24 hr tablet Take 1 tablet by mouth Every Night. (Patient taking differently: Take 50 mg by mouth Daily.) 14 tablet 0   • MULTIPLE VITAMIN PO Take 1 tablet by mouth Every Evening. TO STOP 5 DAYS BEFORE SURGERY     • nitroglycerin (NITROSTAT) 0.4 MG SL tablet Place 1 tablet under the tongue Every 5 (Five) Minutes As Needed for Chest Pain (Only if SBP Greater Than 100). Take no more than 3 doses in 15 minutes. 30 tablet 1     No facility-administered medications prior to visit.       Allergies as of 07/22/2021   • (No Known Allergies)     Social History     Socioeconomic History   • Marital status:       "Spouse name: Not on file   • Number of children: Not on file   • Years of education: Not on file   • Highest education level: Not on file   Tobacco Use   • Smoking status: Former Smoker     Packs/day: 2.00     Years: 32.00     Pack years: 64.00     Types: Cigarettes     Quit date: 2009     Years since quittin.1   • Smokeless tobacco: Never Used   • Tobacco comment: Daily caffeine use   Vaping Use   • Vaping Use: Never assessed   Substance and Sexual Activity   • Alcohol use: Yes     Comment: social drinker   • Drug use: No   • Sexual activity: Defer     Family History   Problem Relation Age of Onset   • Heart attack Mother    • Leukemia Mother    • Cancer Mother    • Heart attack Brother    • Coronary artery disease Brother    • Heart disease Brother    • Deep vein thrombosis Cousin         with embolic death   • Coronary artery disease Father    • Heart disease Father    • COPD Father    • No Known Problems Sister    • No Known Problems Brother        Review of Systems   Constitutional: Negative for chills, fever, weight gain and weight loss.   Cardiovascular:        SEE HPI   Respiratory: Positive for snoring. Negative for cough and wheezing.    Hematologic/Lymphatic: Negative for bleeding problem.   Musculoskeletal: Positive for joint pain. Negative for falls and myalgias.   Gastrointestinal: Negative for melena.   Genitourinary: Negative for hematuria.   Neurological: Negative for dizziness.   Psychiatric/Behavioral: Negative for altered mental status, depression and substance abuse. The patient is not nervous/anxious.           Objective:     Vitals:    21 1138   BP: 120/80   Pulse: 58   SpO2: 98%   Weight: 101 kg (223 lb)   Height: 177.8 cm (70\")     Heart rate improved on recheck from EKG    Body mass index is 32 kg/m².      PHYSICAL EXAM:  Constitutional:       General: Not in acute distress.     Appearance: Well-developed. Not diaphoretic.   Eyes:      Pupils: Pupils are equal, round, and " reactive to light.   HENT:      Head: Normocephalic and atraumatic.   Neck:      Vascular: No carotid bruit or JVD.   Pulmonary:      Effort: Pulmonary effort is normal. No respiratory distress.      Breath sounds: Normal breath sounds. No wheezing. No rales.   Cardiovascular:      Normal rate. Regular rhythm.      Murmurs: There is no murmur.      No gallop. No click. No rub.   Edema:     Peripheral edema absent.   Abdominal:      General: Bowel sounds are normal. There is no distension.      Palpations: Abdomen is soft.   Musculoskeletal: Normal range of motion.         General: No tenderness or deformity.      Cervical back: Neck supple. Skin:     General: Skin is warm and dry.      Findings: No erythema or rash.   Neurological:      Mental Status: Alert and oriented to person, place, and time.   Psychiatric:         Behavior: Behavior normal.         Judgment: Judgment normal.             ECG 12 Lead    Date/Time: 7/22/2021 12:31 PM  Performed by: Sunshine Webster DNP, DIMAS  Authorized by: Sunshine eWbster DNP, DIMAS   Comparison: compared with previous ECG from 7/7/2021  Rhythm: sinus bradycardia  Comments: No significant changes from previous EKG              Assessment:       Diagnosis Plan   1. Coronary artery disease involving native coronary artery of native heart without angina pectoris     2. Essential hypertension  Basic Metabolic Panel   3. Witnessed episode of apnea  Home Sleep Study   4. Dyslipidemia           Plan:     1. Coronary artery disease: With history of CABG in 2009.  With recent heart cath showing chronic total occlusion of the RCA which was not able to be revascularized.  Medical management was recommended and patient was placed back on Imdur and Cialis was stopped.  Also on metoprolol XL.  Reports he was on amlodipine in the past but has not been taking since hospital discharge and would not restart at this time but will call if blood pressure high at home.  Discussed benefits of  cardiac rehab however patient declines.  To get echo today.  Denies any chest pain or discomfort or shortness of breath or anginal symptoms since hospital discharge, feeling well.  2. Hypertension: Blood pressure fairly well controlled in the office today.  Patient monitor at home and call if staying greater than 130/80 on average.  Plan as above.  3. Suspected sleep apnea: Sleep history reviewed with patient.  Decision to order home sleep study reviewed with patient and patient is agreeable.  4. Mild bradycardia: Asymptomatic.  Patient to monitor at home and call for heart rate less than 50 or if he develops symptoms or concerns.  Heart rate improved on recheck.  Look at sleep apnea testing as well.  5. Dyslipidemia: LDL goal less than 70.  We will look at rechecking lipid panel and metabolic panel next visit if not done sooner through primary care.  6. Prediabetes: PCP managing  7. Chronic renal insufficiency: Follows with PCP  8. History of renal cell carcinoma status post nephrectomy: We will recheck a BMP today.  9. History of colon polyp  10. History of dysphagia with negative scope in the past and resolution of symptoms with decrease caffeine and on Prilosec    Patient to schedule 8-week hospital follow-up with Dr. Charles or follow-up sooner if needed for any new, recurrent, or worsening symptoms or other issues or concerns.  Discussed in detail signs/symptoms that warrant sooner call or follow-up to the office or ER visit.      Records reviewed including but not limited to 7/2021 heart cath, 7/2021 discharge summary, 7/2021 EKG, 7/2021 troponin.           Your medication list          Accurate as of July 22, 2021 12:35 PM. If you have any questions, ask your nurse or doctor.            CHANGE how you take these medications      Instructions Last Dose Given Next Dose Due   metoprolol succinate XL 50 MG 24 hr tablet  Commonly known as: Toprol XL  What changed: when to take this      Take 1 tablet by mouth Every  Night.          CONTINUE taking these medications      Instructions Last Dose Given Next Dose Due   aspirin 81 MG tablet      Take 81 mg by mouth Daily. TO STOP 5 DAYS BEFORE SURGERY       atorvastatin 40 MG tablet  Commonly known as: LIPITOR      Take 1 tablet by mouth Every Night.       folic acid 400 MCG tablet  Commonly known as: FOLVITE      Take 400 mcg by mouth Daily.       isosorbide mononitrate 30 MG 24 hr tablet  Commonly known as: IMDUR      Take 1 tablet by mouth Daily.       multivitamin tablet tablet  Commonly known as: THERAGRAN      Take 1 tablet by mouth Every Evening. TO STOP 5 DAYS BEFORE SURGERY       nitroglycerin 0.4 MG SL tablet  Commonly known as: NITROSTAT      Place 1 tablet under the tongue Every 5 (Five) Minutes As Needed for Chest Pain (Only if SBP Greater Than 100). Take no more than 3 doses in 15 minutes.              The above medication changes may not have been made by this provider.  Patient's medication list was updated to reflect medications they are currently taking including medication changes made by other providers.            Thanks,    Sunshine Webster, ISIDORO, APRN  07/22/2021         Dictated utilizing Dragon dictation

## 2021-07-23 ENCOUNTER — TELEPHONE (OUTPATIENT)
Dept: CARDIOLOGY | Facility: CLINIC | Age: 61
End: 2021-07-23

## 2021-08-11 ENCOUNTER — HOSPITAL ENCOUNTER (OUTPATIENT)
Dept: SLEEP MEDICINE | Facility: HOSPITAL | Age: 61
Discharge: HOME | End: 2021-08-11
Admitting: NURSE PRACTITIONER

## 2021-08-11 DIAGNOSIS — R06.81 WITNESSED EPISODE OF APNEA: ICD-10-CM

## 2021-08-11 PROCEDURE — 95806 SLEEP STUDY UNATT&RESP EFFT: CPT

## 2021-08-11 PROCEDURE — 95806 SLEEP STUDY UNATT&RESP EFFT: CPT | Performed by: INTERNAL MEDICINE

## 2021-08-23 ENCOUNTER — TELEPHONE (OUTPATIENT)
Dept: CARDIOLOGY | Facility: CLINIC | Age: 61
End: 2021-08-23

## 2021-08-23 NOTE — TELEPHONE ENCOUNTER
Please let patient know that home sleep study did indicate sleep apnea and some low oxygen levels when sleeping.  Highly recommend keeping upcoming September appointment Dr. Aquino to discuss results in more detail as well as possible treatment recommendations.

## 2021-09-10 ENCOUNTER — APPOINTMENT (OUTPATIENT)
Dept: SLEEP MEDICINE | Facility: HOSPITAL | Age: 61
End: 2021-09-10

## 2021-10-28 ENCOUNTER — OFFICE VISIT (OUTPATIENT)
Dept: CARDIOLOGY | Facility: CLINIC | Age: 61
End: 2021-10-28

## 2021-10-28 VITALS
SYSTOLIC BLOOD PRESSURE: 110 MMHG | BODY MASS INDEX: 32.5 KG/M2 | HEART RATE: 61 BPM | DIASTOLIC BLOOD PRESSURE: 70 MMHG | HEIGHT: 70 IN | WEIGHT: 227 LBS

## 2021-10-28 DIAGNOSIS — I25.10 CORONARY ARTERY DISEASE INVOLVING NATIVE CORONARY ARTERY OF NATIVE HEART WITHOUT ANGINA PECTORIS: Primary | ICD-10-CM

## 2021-10-28 DIAGNOSIS — I10 PRIMARY HYPERTENSION: ICD-10-CM

## 2021-10-28 DIAGNOSIS — E78.5 DYSLIPIDEMIA: ICD-10-CM

## 2021-10-28 PROCEDURE — 99213 OFFICE O/P EST LOW 20 MIN: CPT | Performed by: INTERNAL MEDICINE

## 2021-10-28 PROCEDURE — 93000 ELECTROCARDIOGRAM COMPLETE: CPT | Performed by: INTERNAL MEDICINE

## 2021-11-03 RX ORDER — ISOSORBIDE MONONITRATE 30 MG/1
30 TABLET, EXTENDED RELEASE ORAL
Qty: 90 TABLET | Refills: 1 | Status: SHIPPED | OUTPATIENT
Start: 2021-11-03

## 2021-11-03 RX ORDER — ATORVASTATIN CALCIUM 40 MG/1
40 TABLET, FILM COATED ORAL NIGHTLY
Qty: 90 TABLET | Refills: 1 | Status: SHIPPED | OUTPATIENT
Start: 2021-11-03 | End: 2021-12-23 | Stop reason: ALTCHOICE

## 2021-11-03 RX ORDER — METOPROLOL SUCCINATE 50 MG/1
50 TABLET, EXTENDED RELEASE ORAL NIGHTLY
Qty: 90 TABLET | Refills: 1 | Status: SHIPPED | OUTPATIENT
Start: 2021-11-03

## 2021-12-21 NOTE — PROGRESS NOTES
[facility], [address] [place] [address], [address] [address]  Phone: [phone]  Fax: [phone]    OFFICE VISIT:  12/21/2021    [address]: 1946    Reason For Visit:  Lizzy Roberts is a 70 y.o. male who is here for follow-up for CAD    HPI  Patient is here for follow-up today for CAD with a history of  STEMI on 1/7/18 with 3 bare metal stents, and a previous history of CABG. There is also history of paroxysmal atrial fibrillation, hypertension, hyperlipidemia, myalgia due to statin, tobacco abuse. He was using e-cigarettes, but now states he is completely smoke-free    At his last visit, there was a change in his EKG and a stress test was recommended, but he did not follow through. He returns today stating he is noticing more increasing exertional dyspnea. No orthopnea, PND, edema, sudden weight gain. No complaints of chest pain    Jonathan Grimes MD is PCP.   Lindsey Ovalle has the following history as recorded in [facility]:    Patient Active Problem List    Diagnosis Date Noted    PVD (peripheral vascular disease) ([facility]     Bilateral carotid artery stenosis 01/06/2020    Statin intolerance 02/14/2018    Inferior MI ([address] 01/07/2018    ST elevation myocardial infarction (STEMI) ([facility] [facility]     Atherosclerosis of native artery of extremity with intermittent claudication (HCC) 04/15/2015    Paroxysmal a-fib (HCC)     Tobacco abuse     Chest pain     Hyperlipidemia     Palpitations     HTN (hypertension)     Coronary artery disease involving native coronary artery of native heart with angina pectoris [facility])      Past Medical History:   Diagnosis Date    Atrial fibrillation ([facility] [address]     Bilateral carotid artery stenosis 1/6/2020    CAD (coronary artery disease), native coronary artery     acute inferior MI on 5/15/09, s/p emeergent CABG x4 5/15/09    Chest pain     HTN (hypertension)     Hyperlipidemia     Dr. Breezy Samuel manages    Palpitations     Statin intolerance [facility]  Progress note: 2/10/2017    Patient Identification:  Name:Kev Mae  Age:56 y.o.  Sex: male  :  1960  MRN: [id number]           CC:   angina; lower GIB      Interval history:  plan for C-scope this afternoon.  CT scan of the abdomen revealed diverticulosis and a 16 mm exophytic mass in the left kidney with either hemorrhagic cyst or are enhancing lesion.  He denies any chest pain or abdominal pain.      Vital Signs:   Temp:  [97.5 °F (36.4 °C)-98 °F (36.7 °C)] 97.5 °F (36.4 °C)  Heart Rate:  [52-67] 67  Resp:  [16] 16  BP: (108-121)/(78-88) 119/88  No intake or output data in the 24 hours ending 02/10/17 0621    Physical Examination:    General Appearance No acute distress   Neck No adenopathy, supple, trachea midline, no thyromegaly, no carotid bruit, no JVD   Lungs Clear to auscultation,respirations regular, even and unlabored   Heart Regular rhythm and normal rate, normal S1 and S2, no murmur, no gallop, no rub, no click   Chest wall No abnormalities observed   Abdomen Normal bowel sounds, no masses, no hepatomegaly, soft   Extremities Moves all extremities well, no edema, no cyanosis, no redness   Neurological Alert and oriented x 3     Lab Review:  Personally reviewed the labs, radiology imaging and other cardiac procedures.   Results from last 7 days  Lab Units 02/10/17  0429   SODIUM mmol/L 139   POTASSIUM mmol/L 4.3   CHLORIDE mmol/L 101   TOTAL CO2 mmol/L 27.1   BUN mg/dL 8   CREATININE mg/dL 0.97   CALCIUM mg/dL 9.3   BILIRUBIN mg/dL 1.7*   ALK PHOS U/L 50   ALT (SGPT) U/L 18   AST (SGOT) U/L 19   GLUCOSE mg/dL 95       Results from last 7 days  Lab Units 17  1400   TROPONIN T ng/mL <0.010     )  Results from last 7 days  Lab Units 02/10/17  0429 17  1305   WBC 10*3/mm3 7.56 9.88   HEMOGLOBIN g/dL 15.9 16.4   HEMATOCRIT % 46.4 47.3   PLATELETS 10*3/mm3 183 225       Results from last 7 days  Lab Units 02/10/17  0429   INR  1.02       Medication Review:    Meds reviewed  Scheduled Meds:  aspirin 81 mg Oral Daily   atorvastatin 10 mg Oral Nightly   folic acid 800 mcg Oral Daily   isosorbide mononitrate 30 mg Oral Daily   lisinopril 10 mg Oral Daily   metoprolol succinate XL 50 mg Oral Daily   multivitamin 1 tablet Oral Daily         I personally viewed and interpreted the patient's EKG/Telemetry data    Assessment and Plan  1. Recurrent chest pain consistent with unstable angina unfortunately also associated with bleed.  Appreciate GI assistance.  Plan on cardiac catheterization later today after colonoscopy.  Will try to avoid stenting and possible given new findings of renal mass.  2. Coronary artery disease with history of coronary artery bypass grafting, as above  3. Black stools concerning for GI bleed and abdominal pain.  As above.  4. Exophytic renal mass with possible hemorrhagic versus proteinaceous cyst, ask urology to review and get prior CT scan from [facility] he believes this was done in the past one to 2 years.  He denies any prior history of renal mass  5. Hernia surgery as above  6. History of colonic polyps  7. Dyslipidemia  8. Elevated glucose we'll check hemoglobin A1c and serial Accu-Cheks and hospital  8. Hypertension  9. Second hand smoke exposure.  10.  Mild bradycardia, asymptomatic.  Continue current dose of metoprolol    Mini Charles  2/10/91470:21 AM  25min spent in reviewing records, discussion and examination of the patient and discussion with other members of the patient's medical team.         EMR Dragon/Transcription disclaimer:   Much of this encounter note is an electronic transcription/translation of spoken language to printed text. The electronic translation of spoken language may permit erroneous, or at times, nonsensical words or phrases to be inadvertently transcribed; Although I have reviewed the note for such errors, some may still exist.        2/14/2018    Tobacco abuse     Transient atrial fibrillation or flutter     post op     Past Surgical History:   Procedure Laterality Date    CARDIAC CATHETERIZATION  4/23/12    with stent to left circ, and LAD    CARDIAC CATHETERIZATION  01/07/2018    PTCA/BJ to VG to LAD;  atherectomy and 3 BMS to VG to 1st Circ    CORONARY ARTERY BYPASS GRAFT  05/15/09    x4    DIAGNOSTIC CARDIAC CATH LAB PROCEDURE  05/15/09    left heart cath, left ventr, selective coronary arteriography     FRACTURE SURGERY Left     Leg    VASCULAR SURGERY  4/21/2015 [facility]    Percutaneous cannulation right common femoral artery with 5-Colombian and later 6-Colombian pinnacle destination sheath. Suprarenal abdominal aortogram with bilateral iliofemoral arteriograms. Bilateral lower extremity arteriograms. Left popliteal/tibioperoneal trunk artery balloon angioplasty with 4 mm x 15 mm cutting balloon.  VASCULAR SURGERY  4/21/2015 [facility] cont    Arteriograms after balloon angioplasty. Balloon angioplasty left popliteal artery/tibioperoneal trunk with 5 mm x 40 mm russell balloon. Arteriograms after balloon angioplasty. Left superficial femoral artery balloon angioplasty with 7 mm x 100 mm  balloon. Left superficial femoral artery stent placement idev supera 6.5 x 100 mm self-expanding nitinol stent. Completion arteriograms left lower e    VASCULAR SURGERY  4/21/2015 [facility] cont    extremity. Mynx closure right common femoral artery puncture site.  VASCULAR SURGERY  03/04/2020    [facility]. Percutaneous cannulation left common femoral artery with 5 Colombian glide sheath. Suprarenal abdominal aortogram with bilateral iliofemoral arteriogram.Bilateral lower extremity arteriogram.Minx closure left common femoral artery puncture site.      Family History   Problem Relation Age of Onset    Coronary Art Dis Father     High Blood Pressure Father     Coronary Art Dis Brother     High Blood Pressure Mother     Cancer Mother     High Blood Pressure Sister not bruise/bleed easily. Psychiatric/Behavioral: Negative for agitation. The patient is not nervous/anxious. Objective  Vital Signs - /78   Pulse 61   Ht 6' 1\" (1.854 m)   Wt 155 lb (70.3 kg)   BMI 20.45 kg/m²    Vitals:    12/21/21 1113   BP: 126/78   Pulse: 61   Weight: 155 lb (70.3 kg)   Height: 6' 1\" (1.854 m)     Physical Exam  Vitals and nursing note reviewed. Constitutional:       General: He is not in acute distress. Appearance: He is well-developed. He is not diaphoretic. HENT:      Head: Normocephalic and atraumatic. Right Ear: Hearing and external ear normal.      Left Ear: Hearing and external ear normal.      Nose: Nose normal.   Eyes:      General:         Right eye: No discharge. Left eye: No discharge. Pupils: Pupils are equal, round, and reactive to light. Neck:      Thyroid: No thyromegaly. Vascular: Carotid bruit (right) present. No JVD. Trachea: No tracheal deviation. Cardiovascular:      Rate and Rhythm: Normal rate and regular rhythm. Heart sounds: Normal heart sounds. No murmur heard. No friction rub. No gallop. Comments: No carotid bruit  Pulmonary:      Effort: Pulmonary effort is normal. No respiratory distress. Breath sounds: Normal breath sounds. No wheezing or rales. Abdominal:      Palpations: Abdomen is soft. Tenderness: There is no abdominal tenderness. Musculoskeletal:         General: No swelling or deformity. Cervical back: Neck supple. No muscular tenderness. Right lower leg: Edema (1+) present. Left lower leg: Edema (1+) present. Comments: Wearing compression hose   Skin:     General: Skin is warm and dry. Findings: No rash. Neurological:      General: No focal deficit present. Mental Status: He is alert and oriented to person, place, and time. Cranial Nerves: No cranial nerve deficit.    Psychiatric:         Mood and Affect: Mood normal.         Behavior: Behavior normal.         Judgment: Judgment normal.         Data:  Heart Cath 1/11/18    1.  Left ventricular dysfunction with mild left ventricular   enlargement and relatively mild anterolateral and inferior   hypokinesis with a reduced ejection fraction of 50% or greater. 2.  Extremely severe diffuse native triple-vessel coronary artery   disease as described above    3.  Nonfunctioning left internal mammary graft to the diagonal   branch of the LAD with this branch having been stented   previously. 4.  Acutely occluded saphenous vein graft to the 1st circumflex   marginal branch. 5.  Severe disease near the anastomotic site of the vein graft to   the left anterior descending coronary artery. 6. Widely patent saphenous vein graft to the right coronary   artery. 7. Successful percutaneous coronary intervention with balloon   angioplasty and placement of a single drug-eluting stent to the   distal vein graft to the LAD including the anastomotic site and   portions of the LAD just beyond the insertion of the graft. 8.  Successful percutaneous coronary intervention with direct   infarct extraction atherectomy and stent placement to the vein   graft to the 1st circumflex marginal branch using 3 bare-metal   stents. 9.  Intracoronary nitroprusside administration. Lab Results   Component Value Date    CHOL 158 (L) 01/06/2020    TRIG 77 01/06/2020    HDL 51 (L) 01/06/2020    LDLCALC 92 01/06/2020    LDLDIRECT 141 (H) 04/17/2015     Lab Results   Component Value Date    ALT 24 01/06/2020    AST 24 01/06/2020   Carotids 2/11/2020    Impression       Gregery Mar is mixed plaque visualized in the bilateral internal carotid    artery(ies).    There is less than 50% stenosis in the right internal carotid artery.    There is less than 50% stenosis of the left internal carotid artery.    There is normal antegrade flow in the bilateral vertebral artery(ies).        EKG shows normal sinus rhythm rate 61 with some ST depression and nonspecific T wave abnormality, changed compared to previous EKG dated 6/8/2021    Assessment:     Diagnosis Orders   1. Coronary artery disease involving native coronary artery of native heart with angina pectoris ([facility] [address]     2. Shortness of breath  NM MYOCARDIAL SPECT REST EXERCISE OR RX   3. PAF (paroxysmal atrial fibrillation) ([facility])  EKG 12 lead   4. Mixed hyperlipidemia  ALT    AST    Lipid Panel   5. Primary hypertension     6. Statin intolerance     7. Paroxysmal A-fib ([facility] [address]     8. PVD (peripheral vascular disease) ([facility])         Abnormal EKG/CAD/shortness of breath-plan Lexiscan nuclear stress test  To rule out myocardial ischemia    Hyperlipidemia/myalgia due to statin -he has tried several statins in the past and had myalgias. Check fasting lipids to reassess and consider Zetia in the future    Hypertension-stable on current regimen with lisinopril and amlodipine. He is having some lower extremity edema that may be related to amlodipine. We discussed possibly discontinuing this medication and instead increasing lisinopril. He does not want to make changes at this time. He wears pression stockings and feels edema is tolerable    Carotid stenosis/ PVD-refuses to return to vascular surgery for follow-up. Currently he is not having any leg pain. Last carotid studies in 2020 showed less than 50% stenosis. Continue aspirin, refuses cholesterol medications of any type. Paroxysmal atrial fibrillation-well controlled with sotalol. He has not been anticoagulated in the past.  He notices no recurrence. Tobacco abuse-Instructed patient in smoking cessation rationales, strategies, and available resources x 2 minutes. He has completely stopped smoking including e-cigarettes.   Praised him for his efforts and encouraged continued cessation      Plan    Orders Placed This Encounter   Procedures    NM MYOCARDIAL SPECT REST EXERCISE OR RX     With myocardial perfusion study with sestamibi Standing Status:   Future     Standing Expiration Date:   12/21/2022     Order Specific Question:   Reason for Exam?     Answer:   Shortness of breath     Order Specific Question:   Reason for Exam?     Answer:   EKG abnormalities     Order Specific Question:   Procedure Type     Answer:   Rx     Order Specific Question:   Reason for exam:     Answer:   abnormal EKG, dyspnea    ALT     Standing Status:   Future     Standing Expiration Date:   12/21/2022    AST     Standing Status:   Future     Standing Expiration Date:   12/21/2022    Lipid Panel     Standing Status:   Future     Standing Expiration Date:   12/21/2022     Order Specific Question:   Is Patient Fasting?/# of Hours     Answer:   yes    EKG 12 lead     Order Specific Question:   Reason for Exam?     Answer: Other     Return in about 6 months (around 6/21/2022) for LUIS. Stay smoke free. Call the [address] [place] [address] [phone]  [facility] Nuclear Stress Test  Check cholesterol fasting    Call with any questionsor concerns  Follow up with Kitty Lugo MD for non cardiac problems  Report any new problems  Cardiovascular Fitness-Exercise as tolerated. Strive for 15 minutes of exercise most days of the week. Cardiac / HealthyDiet  Continue current medications as directed  Continue plan of treatment  It is always recommended that you bring your medicationsbottles with you to each visit - this is for your safety!        LUIS Bravo

## 2021-12-23 ENCOUNTER — TELEPHONE (OUTPATIENT)
Dept: CARDIOLOGY | Facility: CLINIC | Age: 61
End: 2021-12-23

## 2021-12-23 RX ORDER — AMLODIPINE BESYLATE 5 MG/1
5 TABLET ORAL DAILY
COMMUNITY
Start: 2021-11-03 | End: 2022-05-06

## 2021-12-23 RX ORDER — OMEPRAZOLE 20 MG/1
20 CAPSULE, DELAYED RELEASE ORAL DAILY
COMMUNITY
Start: 2021-11-18

## 2021-12-23 RX ORDER — SIMVASTATIN 20 MG
20 TABLET ORAL NIGHTLY
COMMUNITY
Start: 2021-11-03 | End: 2022-05-06

## 2022-05-06 ENCOUNTER — OFFICE VISIT (OUTPATIENT)
Dept: CARDIOLOGY | Facility: CLINIC | Age: 62
End: 2022-05-06

## 2022-05-06 VITALS
HEIGHT: 70 IN | SYSTOLIC BLOOD PRESSURE: 110 MMHG | BODY MASS INDEX: 32.87 KG/M2 | WEIGHT: 229.6 LBS | HEART RATE: 72 BPM | DIASTOLIC BLOOD PRESSURE: 72 MMHG

## 2022-05-06 DIAGNOSIS — I25.10 CORONARY ARTERY DISEASE INVOLVING NATIVE CORONARY ARTERY OF NATIVE HEART WITHOUT ANGINA PECTORIS: Primary | ICD-10-CM

## 2022-05-06 DIAGNOSIS — I10 PRIMARY HYPERTENSION: ICD-10-CM

## 2022-05-06 PROCEDURE — 99214 OFFICE O/P EST MOD 30 MIN: CPT | Performed by: NURSE PRACTITIONER

## 2022-05-06 PROCEDURE — 93000 ELECTROCARDIOGRAM COMPLETE: CPT | Performed by: NURSE PRACTITIONER

## 2022-05-06 RX ORDER — ATORVASTATIN CALCIUM 40 MG/1
40 TABLET, FILM COATED ORAL NIGHTLY
COMMUNITY
Start: 2022-03-22

## 2022-05-06 NOTE — PROGRESS NOTES
Date of Office Visit: 2022  Encounter Provider: Sunshine Webster, ISIDORO, APRN  Place of Service: [facility] CARDIOLOGY  Patient Name: Kev Mae  :1960        Subjective:     Chief Complaint:  Coronary artery disease, hypertension      History of Present Illness:  Kev Mae is a 61 y.o. male patient of Dr. Charles.  I am seeing this patient in the office today and I have reviewed his records.    Patient has a history of coronary artery disease, CABG , hypertension, hyperlipidemia, renal cell carcinoma status post resection, colon polyp.     Patient has a history of CAD with prior CABG in .  He was seen 2017 for chest discomfort.  He was found to have renal mass by CT imaging.  He underwent heart catheterization which showed occluded LIMA graft to LAD, mild to moderate disease of native LAD, vein graft to RCA and patent obtuse marginal branch.  He was treated medically.  He subsequently underwent laparoscopic radical nephrectomy with sigmoid resection of the mass.  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 He underwent heart cath 2021 at which point he was noted to have multivessel coronary artery disease with sequential 50% proximal and mid LAD stenoses, circumflex with 99% proximal stenosis, however SVG to mid marginal was patent, RCA contained a complex 99% distal RCA felt to likely be behaving like a recannulized chronic total occlusion with ACE II flow.  LV filling pressures were normal.  Attempts were made to cross the distal RCA but was meeting resistance and felt that likely would lead to dissection if continued.  Medical management was recommended and coronary disease was felt to be essentially unchanged from reports back to  and similar to 2017 heart cath.  Statin was uptitrated and he was restarted on isosorbide.      Patient presents to office today for follow-up appointment.  Patient reports he is  doing well since last visit.  He is staying active doing walking and mushroom hunting up and down hills for about 2 to 3 miles at a time often up to 2-3 times a week.  Denies any exertional symptoms or concerns.  Denies any chest pain or discomfort, shortness of breath, shortness of breath with exertion, palpitations, racing heartbeat sensation, lower extremity edema, dizziness, syncope, near syncope, falls, fatigue, or abnormal bleeding.  Blood pressure at home well controlled, about the same as today.          Past Medical History:   Diagnosis Date   • Bloody stool 04/2017   • CAD (coronary artery disease)     CABG IN THE PAST   • Colon polyps     FOLLOWED BY DR. BIANKA SAUNDERS   • Colonic mass 05/25/2017    SESSILE SERRATED ADENOMA   • Diaphoresis    • Diplopia    • Essential hypertension    • GI hemorrhage 02/09/2017    [facility]MITTED TO [facility]   • H. pylori infection 06/2017    [facility] in [place]   • Hyperlipidemia    • Hypertension    • Myocardial infarction (HCC) 05/25/2009    INFERIOR MI, ADMITTED TO [place]   • Nausea    • Precordial pain 02/2017   • PVC (premature ventricular contraction)    • PVD (peripheral vascular disease) (Formerly [facility])    • Renal cancer (HCC) 03/08/2017    LEFT, S/P NEPHRECTOMY, FOLLOWED BY DR. LA IYER   • SOB (shortness of breath)      Past Surgical History:   Procedure Laterality Date   • AMPUTATION DIGIT Right 04/09/2017    RIGHT RING FINGER   • CARDIAC CATHETERIZATION N/A 2/10/2017    Procedure: Left Heart Cath;  Surgeon: Leland Carpio MD;  Location: [facility] CATH INVASIVE LOCATION;  Service:    • CARDIAC CATHETERIZATION N/A 2009    DR.MINI CALL   • CARDIAC CATHETERIZATION N/A 08/26/2015    DR.WILLIAM PETERSON   • CARDIAC CATHETERIZATION N/A 06/28/2012    DR.WILLIAM PETERSON   • CARDIAC CATHETERIZATION N/A 7/6/2021    Procedure: Left Heart Cath NO LV;  Surgeon: Александр Shelton MD;  Location:  [facility] CATH INVASIVE LOCATION;  Service: Cardiovascular;  Laterality: N/A;  no LV gram   • CARDIAC CATHETERIZATION  N/A 7/6/2021    Procedure: Coronary angiography;  Surgeon: Александр Shelton MD;  Location: [facility] CATH INVASIVE LOCATION;  Service: Cardiovascular;  Laterality: N/A;   • CARDIAC CATHETERIZATION  7/6/2021    Procedure: Saphenous Vein Graft;  Surgeon: Александр Shelton MD;  Location:  [facility] CATH INVASIVE LOCATION;  Service: Cardiovascular;;   • COLON RESECTION N/A 5/25/2017    Procedure: LAPAROSCOPIC SIGMOID COLON RESECTION, MOBILIZATION OF SPLENIC FLEXURE;  Surgeon: Bianka Tang MD;  Location: [facility] MAIN OR;  Service:    • COLONOSCOPY N/A 4/3/2017    MEDIUM INTERNAL HEMORRHOIDS, DIVERTICULOSIS IN SIGMOID, 4 MM TUBULAR ADENOMA POLYP IN TRANSVERSE, 5 MM HYPERPLASTIC POLYP IN TRANSVERSE, NODULAR MUCOSA IN RECTUM, 4 HYPERPLASTIC POLYPS IN RECTUM, DR. FLORIN PERKINS AT [facility]   • COLONOSCOPY N/A 03/03/2015   • COLONOSCOPY W/ POLYPECTOMY N/A 08/06/2012    POLYPS REMOVED   • CORONARY ARTERY BYPASS GRAFT N/A 05/25/2009    3 VESSEL, DONE AT [place]   • INGUINAL HERNIA REPAIR Left 12/29/2016    DR. LUCY KENNY AT [facility]   • NEPHRECTOMY Left 5/25/2017    Procedure: LAPAROSCOPIC LEFT NEPHRECTOMY ;  Surgeon: Dima Thompson MD;  Location: [facility] MAIN OR;  Service:    • SHOULDER ARTHROSCOPY W/ ROTATOR CUFF REPAIR Left 06/20/2015   • SIGMOIDOSCOPY N/A 4/21/2017    AREA OF DISTAL SIGMOID TATTOOED, AN ULCERATED POLYPOID MASS IN DISTAL SIGMOID, NO SPECIMENS COLLECTED, DR. BIANKA TANG AT [facility]   • VASECTOMY N/A     HAD 2 SURGERIES     Outpatient Medications Prior to Visit   Medication Sig Dispense Refill   • aspirin 81 MG tablet Take 81 mg by mouth Daily. TO STOP 5 DAYS BEFORE SURGERY     • atorvastatin (LIPITOR) 40 MG tablet      • folic acid (FOLVITE) 400 MCG tablet Take 400 mcg by mouth Daily.     • isosorbide mononitrate (IMDUR) 30 MG 24 hr tablet Take 1 tablet by mouth Daily. 90 tablet 1   • metoprolol succinate XL (Toprol XL) 50 MG 24 hr tablet Take 1 tablet by mouth Every Night. 90 tablet 1   • nitroglycerin (NITROSTAT) 0.4  MG SL tablet Place 1 tablet under the tongue Every 5 (Five) Minutes As Needed for Chest Pain (Only if SBP Greater Than 100). Take no more than 3 doses in 15 minutes. 30 tablet 1   • omeprazole (priLOSEC) 20 MG capsule Take 20 mg by mouth Daily.     • amLODIPine (NORVASC) 5 MG tablet Take 5 mg by mouth Daily.     • MULTIPLE VITAMIN PO Take 1 tablet by mouth Every Evening. TO STOP 5 DAYS BEFORE SURGERY     • simvastatin (ZOCOR) 20 MG tablet Take 20 mg by mouth Every Night.       No facility-administered medications prior to visit.       Allergies as of 2022   • (No Known Allergies)     Social History     Socioeconomic History   • Marital status:    Tobacco Use   • Smoking status: Former Smoker     Packs/day: 2.00     Years: 32.00     Pack years: 64.00     Types: Cigarettes     Quit date: 2009     Years since quittin.9   • Smokeless tobacco: Never Used   • Tobacco comment: Daily caffeine use   Substance and Sexual Activity   • Alcohol use: Yes     Comment: social drinker   • Drug use: No   • Sexual activity: Defer     Family History   Problem Relation Age of Onset   • Heart attack Mother    • Leukemia Mother    • Cancer Mother    • Heart attack Brother    • Coronary artery disease Brother    • Heart disease Brother    • Deep vein thrombosis Cousin         with embolic death   • Coronary artery disease Father    • Heart disease Father    • COPD Father    • No Known Problems Sister    • No Known Problems Brother        Review of Systems   Constitutional: Negative for malaise/fatigue.   Cardiovascular:        SEE HPI   Respiratory: Negative for shortness of breath.    Hematologic/Lymphatic: Negative for bleeding problem.   Musculoskeletal: Negative for falls.   Gastrointestinal: Negative for melena.   Genitourinary: Negative for hematuria.   Neurological: Negative for dizziness.   Psychiatric/Behavioral: Negative for altered mental status.          Objective:     Vitals:    22 0833   BP:  "110/72   Pulse: 72   Weight: 104 kg (229 lb 9.6 oz)   Height: 177.8 cm (70\")     Body mass index is 32.94 kg/m².      PHYSICAL EXAM:  Constitutional:       General: Not in acute distress.     Appearance: Well-developed. Not diaphoretic.   Eyes:      Pupils: Pupils are equal, round, and reactive to light.   HENT:      Head: Normocephalic and atraumatic.   Neck:      Vascular: No JVD.   Pulmonary:      Effort: Pulmonary effort is normal. No respiratory distress.      Breath sounds: Normal breath sounds. No wheezing. No rales.   Cardiovascular:      Normal rate. Regular rhythm.      Murmurs: There is no murmur.      No gallop. No click. No rub.   Edema:     Peripheral edema absent.   Abdominal:      General: Bowel sounds are normal. There is no distension.      Palpations: Abdomen is soft.   Musculoskeletal: Normal range of motion.         General: No tenderness or deformity.      Cervical back: Neck supple. Skin:     General: Skin is warm and dry.      Findings: No erythema or rash.   Neurological:      Mental Status: Alert and oriented to person, place, and time.             ECG 12 Lead    Date/Time: 5/6/2022 8:51 AM  Performed by: Sunshine Webster DNP, APRN  Authorized by: Sunshine Webster DNP, DIMAS   Comparison: compared with previous ECG from 10/28/2021  Rhythm: sinus rhythm  BPM: 71  Comments: No significant changes from previous EKG              Assessment:       Diagnosis Plan   1. Coronary artery disease involving native coronary artery of native heart without angina pectoris     2. Primary hypertension           Plan:     1. Coronary artery disease: With CABG in 2009.  Heart cath 7/2021 with PCI attempted but felt to be too high risk for dissection.  Medical management was recommended.  On metoprolol XL, isosorbide, aspirin, statin.  He is doing well with medical therapy and denies anginal symptoms.  Discussed continued risk factor modification.  Recommended regular exercise and calling for any symptoms " or concerns prior to next visit.  2. Hypertension: Well-controlled in the office today, patient to continue to monitor.  3. Dyslipidemia: PCP managing.  On high intensity statin.  LDL goal less than 70 and triglyceride goal less than 150.  4. Renal cell carcinoma status post resection  5. Colon polyps  6. History of dysphagia with EGD  7. ED  8. Obstructive sleep apnea    Patient to follow-up with Dr. Charles in 6 months or follow-up sooner if needed for any new, recurrent, or worsening symptoms or concerns.  Discussed in detail signs/symptoms that warrant sooner call or follow-up to the office or ER visit.           Your medication list          Accurate as of May 6, 2022  8:52 AM. If you have any questions, ask your nurse or doctor.            CONTINUE taking these medications      Instructions Last Dose Given Next Dose Due   aspirin 81 MG tablet      Take 81 mg by mouth Daily. TO STOP 5 DAYS BEFORE SURGERY       atorvastatin 40 MG tablet  Commonly known as: LIPITOR           folic acid 400 MCG tablet  Commonly known as: FOLVITE      Take 400 mcg by mouth Daily.       isosorbide mononitrate 30 MG 24 hr tablet  Commonly known as: IMDUR      Take 1 tablet by mouth Daily.       metoprolol succinate XL 50 MG 24 hr tablet  Commonly known as: Toprol XL      Take 1 tablet by mouth Every Night.       nitroglycerin 0.4 MG SL tablet  Commonly known as: NITROSTAT      Place 1 tablet under the tongue Every 5 (Five) Minutes As Needed for Chest Pain (Only if SBP Greater Than 100). Take no more than 3 doses in 15 minutes.       omeprazole 20 MG capsule  Commonly known as: priLOSEC      Take 20 mg by mouth Daily.          STOP taking these medications    amLODIPine 5 MG tablet  Commonly known as: NORVASC  Stopped by: Sunshine Webster DNP, APRN        multivitamin tablet tablet  Commonly known as: THERAGRAN  Stopped by: Sunshine Webster DNP, APRN        simvastatin 20 MG tablet  Commonly known as: ZOCOR  Stopped by: Sunshine DAVIS  Darin, ISIDORO, APRN             I did NOT stop or change the above medications.  Patient's medication list was updated to reflect medications they are currently taking including medication changes made by other providers.            Thanks,    Sunshine Webster DNP, APRN  05/06/2022         Dictated utilizing Dragon dictation

## 2022-11-15 ENCOUNTER — OFFICE VISIT (OUTPATIENT)
Dept: CARDIOLOGY | Facility: CLINIC | Age: 62
End: 2022-11-15

## 2022-11-15 ENCOUNTER — TELEPHONE (OUTPATIENT)
Dept: CARDIOLOGY | Facility: CLINIC | Age: 62
End: 2022-11-15

## 2022-11-15 VITALS
BODY MASS INDEX: 33.21 KG/M2 | HEART RATE: 62 BPM | DIASTOLIC BLOOD PRESSURE: 80 MMHG | HEIGHT: 70 IN | WEIGHT: 232 LBS | SYSTOLIC BLOOD PRESSURE: 120 MMHG

## 2022-11-15 DIAGNOSIS — I10 PRIMARY HYPERTENSION: ICD-10-CM

## 2022-11-15 DIAGNOSIS — E78.2 MIXED HYPERLIPIDEMIA: ICD-10-CM

## 2022-11-15 DIAGNOSIS — E78.5 DYSLIPIDEMIA: ICD-10-CM

## 2022-11-15 DIAGNOSIS — I25.10 CORONARY ARTERY DISEASE INVOLVING NATIVE CORONARY ARTERY OF NATIVE HEART WITHOUT ANGINA PECTORIS: Primary | ICD-10-CM

## 2022-11-15 PROCEDURE — 99213 OFFICE O/P EST LOW 20 MIN: CPT | Performed by: INTERNAL MEDICINE

## 2022-11-15 PROCEDURE — 93000 ELECTROCARDIOGRAM COMPLETE: CPT | Performed by: INTERNAL MEDICINE

## 2022-11-15 NOTE — PROGRESS NOTES
Date of Office Visit: 11/15/2022  Encounter Provider: Hina Charles MD  Place of Service: [facility] CARDIOLOGY  Patient Name: Kev Mae  :1960    Chief complaint  CAD    History of Present Illness  The patient is a 62 year-old gentleman with history of coronary artery disease with prior CABG in . He was seen in 2017 with chest discomfort. He was found to have a renal mass by CT imaging. He also underwent cardiac catheterization which showed an occluded LIMA graft to the LAD, mild-to-moderate disease of the native LAD with a vein graft to the right coronary artery disease and obtuse marginal branch that was patent. He was treated medically. He subsequently underwent laparoscopic radical nephrectomy with sigmoid resection of the mass. 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 He presented on 2021 to [facility] with chest pain.  He underwent cardiac catheterization that showed sequential 50% mid and proximal LAD stenosis.  There was a 99 stenosis of the proximal segment of circumflex artery.  The right coronary artery had 70% stenosis with recanalization and dissection with a 99% distal stenosis endocrine 100% occlusion the posterior lateral branch with left to right filling collaterals.  PCI was attempted to distal right coronary artery stenosis but unsuccessful.  Medical therapy was recommended and nitrates were added.  He was instructed not to utilize Cialis.  Statin therapy was switched to Lipitor.  Subsequent echocardiogram showed an ejection fraction of 55 to 60% with normal diastolic function no significant valvular heart disease or pulmonary hypertension    Since last visit he is not using his CPAP he is working outside all day long.  He denies any chest pain, shortness of breath, palpitations, syncope near syncope.  He believes blood pressures been as it is today.  He had blood work 2 months ago and believes  they were normal but cannot recall results and they are not available in epic system      Past Medical History:   Diagnosis Date   • Bloody stool 04/2017   • CAD (coronary artery disease)     CABG IN THE PAST   • Colon polyps     FOLLOWED BY DR. BIANKA SAUNDERS   • Colonic mass 05/25/2017    SESSILE SERRATED ADENOMA   • Diaphoresis    • Diplopia    • Essential hypertension    • GI hemorrhage 02/09/2017    ADMITTED TO [facility]   • H. pylori infection 06/2017    [facility] in [place]   • Hyperlipidemia    • Hypertension    • Myocardial infarction (HCC) 05/25/2009    INFERIOR MI, ADMITTED TO [facility]   • Nausea    • Precordial pain 02/2017   • PVC (premature ventricular contraction)    • PVD (peripheral vascular disease) (HCC)    • Renal cancer (HCC) 03/08/2017    LEFT, S/P NEPHRECTOMY, FOLLOWED BY DR. LA IYER   • SOB (shortness of breath)      Past Surgical History:   Procedure Laterality Date   • AMPUTATION DIGIT Right 04/09/2017    RIGHT RING FINGER   • CARDIAC CATHETERIZATION N/A 2/10/2017    Procedure: Left Heart Cath;  Surgeon: Leland Carpio MD;  Location: [facility] CATH INVASIVE LOCATION;  Service:    • CARDIAC CATHETERIZATION N/A 2009    DR.MINI CALL   • CARDIAC CATHETERIZATION N/A 08/26/2015    DR.WILLIAM PETERSON   • CARDIAC CATHETERIZATION N/A 06/28/2012    DR.WILLIAM PETERSON   • CARDIAC CATHETERIZATION N/A 7/6/2021    Procedure: Left Heart Cath NO LV;  Surgeon: Александр Shelton MD;  Location:  [facility] CATH INVASIVE LOCATION;  Service: Cardiovascular;  Laterality: N/A;  no LV gram   • CARDIAC CATHETERIZATION N/A 7/6/2021    Procedure: Coronary angiography;  Surgeon: Александр Shelton MD;  Location:  [facility] CATH INVASIVE LOCATION;  Service: Cardiovascular;  Laterality: N/A;   • CARDIAC CATHETERIZATION  7/6/2021    Procedure: Saphenous Vein Graft;  Surgeon: Александр Shelton MD;  Location:  [facility] CATH INVASIVE LOCATION;  Service: Cardiovascular;;   • COLON RESECTION N/A 5/25/2017    Procedure: LAPAROSCOPIC SIGMOID COLON  RESECTION, MOBILIZATION OF SPLENIC FLEXURE;  Surgeon: Bianka Tang MD;  Location: [facility] OR;  Service:    • COLONOSCOPY N/A 4/3/2017    MEDIUM INTERNAL HEMORRHOIDS, DIVERTICULOSIS IN SIGMOID, 4 MM TUBULAR ADENOMA POLYP IN TRANSVERSE, 5 MM HYPERPLASTIC POLYP IN TRANSVERSE, NODULAR MUCOSA IN RECTUM, 4 HYPERPLASTIC POLYPS IN RECTUM, DR. FLORIN PERKINS AT [facility]   • COLONOSCOPY N/A 03/03/2015   • COLONOSCOPY W/ POLYPECTOMY N/A 08/06/2012    POLYPS REMOVED   • CORONARY ARTERY BYPASS GRAFT N/A 05/25/2009    3 VESSEL, DONE AT [facility]   • INGUINAL HERNIA REPAIR Left 12/29/2016    DR. LUCY KENNY AT [facility]   • NEPHRECTOMY Left 5/25/2017    Procedure: LAPAROSCOPIC LEFT NEPHRECTOMY ;  Surgeon: Dima Thompson MD;  Location: [facility];  Service:    • SHOULDER ARTHROSCOPY W/ ROTATOR CUFF REPAIR Left 06/20/2015   • SIGMOIDOSCOPY N/A 4/21/2017    AREA OF DISTAL SIGMOID TATTOOED, AN ULCERATED POLYPOID MASS IN DISTAL SIGMOID, NO SPECIMENS COLLECTED, DR. BIANKA TANG AT [facility]   • VASECTOMY N/A     HAD 2 SURGERIES     Outpatient Medications Prior to Visit   Medication Sig Dispense Refill   • aspirin 81 MG tablet Take 81 mg by mouth Daily. TO STOP 5 DAYS BEFORE SURGERY     • atorvastatin (LIPITOR) 40 MG tablet Take 1 tablet by mouth Every Night.     • folic acid (FOLVITE) 400 MCG tablet Take 400 mcg by mouth Daily.     • isosorbide mononitrate (IMDUR) 30 MG 24 hr tablet Take 1 tablet by mouth Daily. 90 tablet 1   • metoprolol succinate XL (Toprol XL) 50 MG 24 hr tablet Take 1 tablet by mouth Every Night. 90 tablet 1   • nitroglycerin (NITROSTAT) 0.4 MG SL tablet Place 1 tablet under the tongue Every 5 (Five) Minutes As Needed for Chest Pain (Only if SBP Greater Than 100). Take no more than 3 doses in 15 minutes. 30 tablet 1   • omeprazole (priLOSEC) 20 MG capsule Take 20 mg by mouth Daily.       No facility-administered medications prior to visit.       Allergies as of 11/15/2022   • (No Known Allergies)     Social History  "    Socioeconomic History   • Marital status:    Tobacco Use   • Smoking status: Former     Packs/day: 2.00     Years: 32.00     Pack years: 64.00     Types: Cigarettes     Quit date: 2009     Years since quittin.4   • Smokeless tobacco: Never   • Tobacco comments:     Daily caffeine use   Substance and Sexual Activity   • Alcohol use: Yes     Comment: social drinker   • Drug use: No   • Sexual activity: Defer     Family History   Problem Relation Age of Onset   • Heart attack Mother    • Leukemia Mother    • Cancer Mother    • Heart attack Brother    • Coronary artery disease Brother    • Heart disease Brother    • Deep vein thrombosis Cousin         with embolic death   • Coronary artery disease Father    • Heart disease Father    • COPD Father    • No Known Problems Sister    • No Known Problems Brother      Review of Systems   Constitutional: Negative for chills, fever, weight gain and weight loss.   Cardiovascular: Negative for leg swelling.   Respiratory: Negative for cough, snoring and wheezing.    Hematologic/Lymphatic: Negative for bleeding problem. Bruises/bleeds easily.   Skin: Negative for color change.   Musculoskeletal: Negative for falls, joint pain and myalgias.   Gastrointestinal: Negative for melena.   Genitourinary: Negative for hematuria.   Neurological: Negative for excessive daytime sleepiness.   Psychiatric/Behavioral: Negative for depression. The patient is not nervous/anxious.         Objective:     Vitals:    11/15/22 0744   BP: 120/80   Pulse: 62   Weight: 105 kg (232 lb)   Height: 177.8 cm (70\")     Body mass index is 33.29 kg/m².    Vitals reviewed.   Constitutional:       Appearance: Well-developed. Obese.   Eyes:      General: No scleral icterus.        Right eye: No discharge.      Conjunctiva/sclera: Conjunctivae normal.      Pupils: Pupils are equal, round, and reactive to light.   HENT:      Head: Normocephalic.      Nose: Nose normal.   Neck:      Thyroid: No " thyromegaly.      Vascular: No JVD.   Pulmonary:      Effort: Pulmonary effort is normal. No respiratory distress.      Breath sounds: Normal breath sounds. No wheezing. No rales.   Cardiovascular:      Normal rate. Regular rhythm. Normal S1. Normal S2.      Murmurs: There is no murmur.      No gallop.   Pulses:     Carotid: 2+ bilaterally.     Radial: 2+ bilaterally.     Dorsalis pedis: 2+ bilaterally.     Posterior tibial: 2+ bilaterally.  Edema:     Peripheral edema absent.   Abdominal:      General: Bowel sounds are normal. There is no distension.      Palpations: Abdomen is soft.      Tenderness: There is no abdominal tenderness. There is no rebound.   Musculoskeletal: Normal range of motion.         General: No tenderness.      Cervical back: Normal range of motion and neck supple. Skin:     General: Skin is warm and dry.      Findings: No erythema or rash.   Neurological:      Mental Status: Alert and oriented to person, place, and time.   Psychiatric:         Behavior: Behavior normal.         Thought Content: Thought content normal.         Judgment: Judgment normal.       Lab Review:     ECG 12 Lead    Date/Time: 11/15/2022 7:45 AM  Performed by: Hina Charles MD  Authorized by: Hina Charles MD   Comparison: compared with previous ECG   Similar to previous ECG  Rhythm: sinus rhythm    Clinical impression: normal ECG          Assessment:       Diagnosis Plan   1. Coronary artery disease involving native coronary artery of native heart without angina pectoris  ECG 12 Lead      2. Dyslipidemia  ECG 12 Lead      3. Primary hypertension        4. Mixed hyperlipidemia          Plan:       1.  Coronary artery disease with CABG 2009.  Cardiac cath with attempted PCI 7/2021 with disease progression the right coronary artery and modest to progression in the LAD.  No anginal symptoms.  Continue risk factor modification follow-up in 1 year as long as notes new symptoms occur  2.  Hypertension.  Controlled  3.   Dyslipidemia, followed by Dr. Jnoes.  Goal LDL less than 70, HDL greater than 40, triglycerides less than 150.  We will try to get a copy of labs done 2 months ago  4.  History of renal cell carcinoma, status post resection.  5.  Colonic polyps.  6.  History of borderline elevated glucose.  As above  7.  Vascular screening.  Again recommended vascular screening study and gave him phone number to call in paperwork regarding this.  8.  Dysphagia with recent EGD. Better off caffine. Followed by Dr Jones   9.  Erectile dysfunction.  He reassures me he is not utilizing Cialis.  10.  Untreated sleep apnea.  Unfortunately not pursuing treatment of this.      STOP-Bang Score  Have you been diagnosed with Sleep Apnea?: yes (Untreated- does not use machine)      Time Spent: I spent 25 minutes caring for Kev on this date of service. This time includes time spent by me in the following activities: preparing for the visit, reviewing tests, obtaining and/or reviewing a separately obtained history, performing a medically appropriate examination and/or evaluation, counseling and educating the patient/family/caregiver, documenting information in the medical record and independently interpreting results and communicating that information with the patient/family/caregiver.   I spent 1 minutes on the separately reported service of ECG. This time is not included in the time used to support the E/M service also reported today.        Your medication list          Accurate as of November 15, 2022 11:59 PM. If you have any questions, ask your nurse or doctor.            CONTINUE taking these medications      Instructions Last Dose Given Next Dose Due   aspirin 81 MG tablet      Take 81 mg by mouth Daily. TO STOP 5 DAYS BEFORE SURGERY       atorvastatin 40 MG tablet  Commonly known as: LIPITOR      Take 1 tablet by mouth Every Night.       folic acid 400 MCG tablet  Commonly known as: FOLVITE      Take 400 mcg by mouth Daily.        isosorbide mononitrate 30 MG 24 hr tablet  Commonly known as: IMDUR      Take 1 tablet by mouth Daily.       metoprolol succinate XL 50 MG 24 hr tablet  Commonly known as: Toprol XL      Take 1 tablet by mouth Every Night.       nitroglycerin 0.4 MG SL tablet  Commonly known as: NITROSTAT      Place 1 tablet under the tongue Every 5 (Five) Minutes As Needed for Chest Pain (Only if SBP Greater Than 100). Take no more than 3 doses in 15 minutes.       omeprazole 20 MG capsule  Commonly known as: priLOSEC      Take 20 mg by mouth Daily.              Patient is no longer taking -.  I corrected the med list to reflect this.  I did not stop these medications.      Dictated utilizing Dragon dictation

## 2023-12-05 ENCOUNTER — TELEPHONE (OUTPATIENT)
Dept: CARDIOLOGY | Facility: CLINIC | Age: 63
End: 2023-12-05
Payer: COMMERCIAL

## 2023-12-05 NOTE — TELEPHONE ENCOUNTER
REQUEST FOR CARDIAC CLEARANCE    Caller name: Kev Mae     Phone Number: [phone]    Surgeon's name: DR. PADGETT    Type of planned surgery: HIP SURGERY    Date of planned surgery: N/A    Type of anesthesia: GENERAL    Have you been experiencing chest pain or shortness of breath? NO    Is your doctor requesting for you to stop any of your medications prior to your surgery? NOT SURE    Where should we fax the clearance to?  [facility]      PATIENT WAS INSTRUCTED TO COME IN FOR A STRESS TEST FOR SUGTAMARA

## 2023-12-08 ENCOUNTER — OFFICE VISIT (OUTPATIENT)
Dept: CARDIOLOGY | Facility: CLINIC | Age: 63
End: 2023-12-08
Payer: COMMERCIAL

## 2023-12-08 VITALS
DIASTOLIC BLOOD PRESSURE: 84 MMHG | SYSTOLIC BLOOD PRESSURE: 142 MMHG | HEIGHT: 70 IN | WEIGHT: 217.4 LBS | BODY MASS INDEX: 31.12 KG/M2 | HEART RATE: 60 BPM

## 2023-12-08 DIAGNOSIS — I10 PRIMARY HYPERTENSION: ICD-10-CM

## 2023-12-08 DIAGNOSIS — Z85.528 HISTORY OF RENAL CELL CARCINOMA: ICD-10-CM

## 2023-12-08 DIAGNOSIS — G47.33 OBSTRUCTIVE SLEEP APNEA SYNDROME: ICD-10-CM

## 2023-12-08 DIAGNOSIS — I25.10 CORONARY ARTERY DISEASE INVOLVING NATIVE CORONARY ARTERY OF NATIVE HEART WITHOUT ANGINA PECTORIS: Primary | ICD-10-CM

## 2023-12-08 DIAGNOSIS — E78.2 MIXED HYPERLIPIDEMIA: ICD-10-CM

## 2023-12-08 PROCEDURE — 93000 ELECTROCARDIOGRAM COMPLETE: CPT | Performed by: NURSE PRACTITIONER

## 2023-12-08 PROCEDURE — 99214 OFFICE O/P EST MOD 30 MIN: CPT | Performed by: NURSE PRACTITIONER

## 2023-12-08 NOTE — PROGRESS NOTES
Date of Office Visit: 2023  Encounter Provider: DIMAS Bobby  Place of Service: [facility] CARDIOLOGY  Patient Name: Kev Mea  :1960    Chief complaint  CAD     History of Present Illness  Patient is a 63 y.o. year old male  patient of Dr. Charles. Past medical history includes coronary artery disease with prior CABG in . He was seen in 2017 with chest discomfort. He was found to have a renal mass by CT imaging. He also underwent cardiac catheterization which showed an occluded LIMA graft to the LAD, mild-to-moderate disease of the native LAD with a vein graft to the right coronary artery disease and obtuse marginal branch that was patent. He was treated medically. He subsequently underwent laparoscopic radical nephrectomy with sigmoid resection of the mass. 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 He presented on 2021 to [facility] with chest pain.  He underwent cardiac catheterization that showed sequential 50% mid and proximal LAD stenosis.  There was a 99 stenosis of the proximal segment of circumflex artery.  The right coronary artery had 70% stenosis with recanalization and dissection with a 99% distal stenosis endocrine 100% occlusion the posterior lateral branch with left to right filling collaterals.  PCI was attempted to distal right coronary artery stenosis but unsuccessful.  Medical therapy was recommended and nitrates were added.  He was instructed not to utilize Cialis.  Statin therapy was switched to Lipitor.  Subsequent echocardiogram showed an ejection fraction of 55 to 60% with normal diastolic function no significant valvular heart disease or pulmonary hypertension.     Interval history  Patient presents today for cardiac clearance prior to hip surgery.  Patient denies palpitations, shortness of breath, edema, dizziness, chest pain or chest pressure, fatigue, syncope or presyncope.   Blood pressure today is elevated, but he is not routinely checking blood pressure at home.  He is walking daily and denies exertional symptoms.    Past Medical History:   Diagnosis Date    Bloody stool 04/2017    CAD (coronary artery disease)     CABG IN THE PAST    Colon polyps     FOLLOWED BY DR. BIANKA SAUNDERS    Colonic mass 05/25/2017    SESSILE SERRATED ADENOMA    Diaphoresis     Diplopia     Essential hypertension     GI hemorrhage 02/09/2017    ADMITTED TO [facility]    H. pylori infection 06/2017    [facility] in [place]    Hyperlipidemia     Hypertension     Myocardial infarction 05/25/2009    INFERIOR MI, ADMITTED TO [place]    Nausea     Precordial pain 02/2017    PVC (premature ventricular contraction)     PVD (peripheral vascular disease)     Renal cancer 03/08/2017    LEFT, S/P NEPHRECTOMY, FOLLOWED BY DR. LA IYER    SOB (shortness of breath)      Past Surgical History:   Procedure Laterality Date    AMPUTATION DIGIT Right 04/09/2017    RIGHT RING FINGER    CARDIAC CATHETERIZATION N/A 2/10/2017    Procedure: Left Heart Cath;  Surgeon: Leland Carpio MD;  Location: [facility] CATH INVASIVE LOCATION;  Service:     CARDIAC CATHETERIZATION N/A 2009    DR.MINI CALL    CARDIAC CATHETERIZATION N/A 08/26/2015    DR.WILLIAM PETERSON    CARDIAC CATHETERIZATION N/A 06/28/2012    DR.WILLIAM PETERSON    CARDIAC CATHETERIZATION N/A 7/6/2021    Procedure: Left Heart Cath NO LV;  Surgeon: Александр Shelton MD;  Location: [facility] CATH INVASIVE LOCATION;  Service: Cardiovascular;  Laterality: N/A;  no LV gram    CARDIAC CATHETERIZATION N/A 7/6/2021    Procedure: Coronary angiography;  Surgeon: Александр Shelton MD;  Location: [facility] CATH INVASIVE LOCATION;  Service: Cardiovascular;  Laterality: N/A;    CARDIAC CATHETERIZATION  7/6/2021    Procedure: Saphenous Vein Graft;  Surgeon: Александр Shelton MD;  Location:  [facility] CATH INVASIVE LOCATION;  Service: Cardiovascular;;    COLON RESECTION N/A 5/25/2017    Procedure: LAPAROSCOPIC SIGMOID  COLON RESECTION, MOBILIZATION OF SPLENIC FLEXURE;  Surgeon: Bianak Tang MD;  Location: [facility] MAIN OR;  Service:     COLONOSCOPY N/A 4/3/2017    MEDIUM INTERNAL HEMORRHOIDS, DIVERTICULOSIS IN SIGMOID, 4 MM TUBULAR ADENOMA POLYP IN TRANSVERSE, 5 MM HYPERPLASTIC POLYP IN TRANSVERSE, NODULAR MUCOSA IN RECTUM, 4 HYPERPLASTIC POLYPS IN RECTUM, DR. FLORIN PERKINS AT [facility]    COLONOSCOPY N/A 03/03/2015    COLONOSCOPY W/ POLYPECTOMY N/A 08/06/2012    POLYPS REMOVED    CORONARY ARTERY BYPASS GRAFT N/A 05/25/2009    3 VESSEL, DONE AT [place]    INGUINAL HERNIA REPAIR Left 12/29/2016    DR. LUCY KENNY AT [facility]    NEPHRECTOMY Left 5/25/2017    Procedure: LAPAROSCOPIC LEFT NEPHRECTOMY ;  Surgeon: Dima Thompson MD;  Location: [facility] OR;  Service:     SHOULDER ARTHROSCOPY W/ ROTATOR CUFF REPAIR Left 06/20/2015    SIGMOIDOSCOPY N/A 4/21/2017    AREA OF DISTAL SIGMOID TATTOOED, AN ULCERATED POLYPOID MASS IN DISTAL SIGMOID, NO SPECIMENS COLLECTED, DR. BIANKA TANG AT [facility]    VASECTOMY N/A     HAD 2 SURGERIES     Outpatient Medications Prior to Visit   Medication Sig Dispense Refill    aspirin 81 MG tablet Take 1 tablet by mouth Daily. TO STOP 5 DAYS BEFORE SURGERY      atorvastatin (LIPITOR) 40 MG tablet Take 1 tablet by mouth Every Night.      folic acid (FOLVITE) 400 MCG tablet Take 1 tablet by mouth Daily.      isosorbide mononitrate (IMDUR) 30 MG 24 hr tablet Take 1 tablet by mouth Daily. 90 tablet 1    metoprolol succinate XL (Toprol XL) 50 MG 24 hr tablet Take 1 tablet by mouth Every Night. 90 tablet 1    nitroglycerin (NITROSTAT) 0.4 MG SL tablet Place 1 tablet under the tongue Every 5 (Five) Minutes As Needed for Chest Pain (Only if SBP Greater Than 100). Take no more than 3 doses in 15 minutes. 30 tablet 1    omeprazole (priLOSEC) 20 MG capsule Take 1 capsule by mouth Daily.       No facility-administered medications prior to visit.       Allergies as of 12/08/2023    (No Known Allergies)     Social History  "    Socioeconomic History    Marital status:    Tobacco Use    Smoking status: Former     Packs/day: 2.00     Years: 32.00     Additional pack years: 0.00     Total pack years: 64.00     Types: Cigarettes     Quit date: 2009     Years since quittin.5    Smokeless tobacco: Never    Tobacco comments:     Daily caffeine use   Vaping Use    Vaping Use: Never used   Substance and Sexual Activity    Alcohol use: Yes     Comment: social drinker    Drug use: No    Sexual activity: Defer     Family History   Problem Relation Age of Onset    Heart attack Mother     Leukemia Mother     Cancer Mother     Heart attack Brother     Coronary artery disease Brother     Heart disease Brother     Deep vein thrombosis Cousin         with embolic death    Coronary artery disease Father     Heart disease Father     COPD Father     No Known Problems Sister     No Known Problems Brother      Review of Systems   Constitutional: Negative for malaise/fatigue.   Cardiovascular:  Negative for chest pain, claudication, dyspnea on exertion, leg swelling, near-syncope, orthopnea, palpitations, paroxysmal nocturnal dyspnea and syncope.   Respiratory:  Negative for shortness of breath.    Neurological:  Negative for brief paralysis, dizziness, headaches and light-headedness.   All other systems reviewed and are negative.       Objective:     Vitals:    23 0955   BP: 142/84   BP Location: Right arm   Patient Position: Sitting   Cuff Size: Small Adult   Pulse: 60   Weight: 98.6 kg (217 lb 6.4 oz)   Height: 177.8 cm (70\")     Body mass index is 31.19 kg/m².    Vitals reviewed.   Constitutional:       General: Not in acute distress.     Appearance: Well-developed and not in distress. Not diaphoretic.   HENT:      Head: Normocephalic.   Pulmonary:      Effort: Pulmonary effort is normal. No respiratory distress.      Breath sounds: Normal breath sounds. No wheezing. No rhonchi. No rales.   Cardiovascular:      Normal rate. Regular " "rhythm.      Murmurs: There is no murmur.   Pulses:     Radial: 2+ bilaterally.  Edema:     Peripheral edema absent.   Skin:     General: Skin is warm and dry. There is no cyanosis.      Findings: No rash.   Neurological:      Mental Status: Alert and oriented to person, place, and time.   Psychiatric:         Behavior: Behavior normal. Behavior is cooperative.         Thought Content: Thought content normal.         Judgment: Judgment normal.       Lab Review:     Lab Results   Component Value Date     07/22/2021     07/07/2021    K 4.6 07/22/2021    K 3.9 07/07/2021     07/22/2021     07/07/2021    CO2 28.9 07/22/2021    CO2 25.8 07/07/2021    BUN 13 07/22/2021    BUN 21 (H) 07/07/2021    CREATININE 1.27 07/22/2021    CREATININE 1.35 (H) 07/07/2021    EGFRIFNONA 58 (L) 07/22/2021    EGFRIFNONA 54 (L) 07/07/2021    GLUCOSE 83 07/22/2021    GLUCOSE 96 07/07/2021    CALCIUM 9.3 07/22/2021    CALCIUM 9.0 07/07/2021    ALBUMIN 3.90 02/10/2017    ALBUMIN 4.20 02/09/2017    BILITOT 1.7 (H) 02/10/2017    BILITOT 1.4 (H) 02/09/2017    AST 19 02/10/2017    AST 13 02/09/2017    ALT 18 02/10/2017    ALT 20 02/09/2017     Lab Results   Component Value Date    WBC 7.06 05/29/2017    WBC 10.26 05/27/2017    HGB 14.4 05/29/2017    HGB 13.7 05/27/2017    HCT 40.1 (L) 05/29/2017    HCT 38.9 (L) 05/27/2017    MCV 88.3 05/29/2017    MCV 90.3 05/27/2017     05/29/2017     05/27/2017     Lab Results   Component Value Date    PROBNP 598.0 05/26/2017    PROBNP 47.9 02/09/2017     Lab Results   Component Value Date    CKTOTAL 417 (H) 05/26/2017    CKMB 1.44 05/26/2017    TROPONINT <0.010 07/06/2021     No results found for: \"TSH\"          ECG 12 Lead    Date/Time: 12/8/2023 10:31 AM  Performed by: Evie Turcios APRN    Authorized by: Evie Turcios APRN  Comparison: compared with previous ECG   Similar to previous ECG  Rhythm: sinus rhythm  Rate: normal  BPM: 60  QRS axis: normal  Comments: " Similar to prior. No new ischemic changes        Assessment:       Diagnosis Plan   1. Coronary artery disease involving native coronary artery of native heart without angina pectoris        2. Primary hypertension        3. Mixed hyperlipidemia        4. History of renal cell carcinoma        5. Obstructive sleep apnea syndrome          Plan:       1.  Coronary artery disease with CABG 2009.  Cardiac cath with attempted PCI 7/2021 with disease progression in the right coronary artery and modest progression in the LAD.  No anginal symptoms.  OK to proceed with hip replacement as scheduled by orthopedics. Continue risk factor modification and follow up in 1 year.  2.  Hypertension.  Elevated in office. I asked him to check BP more consistently at home and call if greater than 130/80 on average.  3.  Dyslipidemia, followed by Dr. Jones.  Goal LDL less than 70, HDL greater than 40, triglycerides less than 150.  Remains on statin.  4.  History of renal cell carcinoma, status post resection.  5.  Colonic polyps.  6.  History of borderline elevated glucose.  As above  7.  Dysphagia with recent EGD. Better off caffeine. Followed by Dr Jones.   8.  Erectile dysfunction.  He reassures me he is not utilizing Cialis.  9.  Untreated sleep apnea.  Unfortunately not pursuing treatment of this. Discussed the correlation between untreated sleep apnea and hypertension, coronary disease, and dysrhythmia.       Time Spent: I spent 30 minutes caring for Kev on this date of service. This time includes time spent by me in the following activities: preparing for the visit, reviewing tests, performing a medically appropriate examination and/or evaluations, counseling and educating the patient/family/caregiver, ordering medications, tests, or procedures, documenting information in the medical record, and independently interpreting results and communicating that information with the patient/family/caregiver.   I spent 1 minutes on  the separately reported service of ECG. This time is not included in the time used to support the E/M service also reported today.        Your medication list            Accurate as of December 8, 2023 10:32 AM. If you have any questions, ask your nurse or doctor.                CONTINUE taking these medications        Instructions Last Dose Given Next Dose Due   aspirin 81 MG tablet      Take 1 tablet by mouth Daily. TO STOP 5 DAYS BEFORE SURGERY       atorvastatin 40 MG tablet  Commonly known as: LIPITOR      Take 1 tablet by mouth Every Night.       folic acid 400 MCG tablet  Commonly known as: FOLVITE      Take 1 tablet by mouth Daily.       isosorbide mononitrate 30 MG 24 hr tablet  Commonly known as: IMDUR      Take 1 tablet by mouth Daily.       metoprolol succinate XL 50 MG 24 hr tablet  Commonly known as: Toprol XL      Take 1 tablet by mouth Every Night.       nitroglycerin 0.4 MG SL tablet  Commonly known as: NITROSTAT      Place 1 tablet under the tongue Every 5 (Five) Minutes As Needed for Chest Pain (Only if SBP Greater Than 100). Take no more than 3 doses in 15 minutes.       omeprazole 20 MG capsule  Commonly known as: priLOSEC      Take 1 capsule by mouth Daily.                Patient is no longer taking -.  I corrected the med list to reflect this.  I did not stop these medications.    Return in about 1 year (around 12/8/2024) for with Dr. Charles.      Dictated utilizing Dragon dictation

## 2024-12-15 NOTE — PROGRESS NOTES
Date of Office Visit: 2024  Encounter Provider: Hina Charles MD  Place of Service: [facility]  Patient Name: Kev Mae  :1960    Chief complaint  CAD    History of Present Illness  The patient is a 64 year-old gentleman with history of coronary artery disease with prior CABG in . He was seen in 2017 with chest discomfort. He was found to have a renal mass by CT imaging. He also underwent cardiac catheterization which showed an occluded LIMA graft to the LAD, mild-to-moderate disease of the native LAD with a vein graft to the right coronary artery disease and obtuse marginal branch that was patent. He was treated medically. He subsequently underwent laparoscopic radical nephrectomy with sigmoid resection of the mass. 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 He presented on 2021 to [facility] with chest pain.  He underwent cardiac catheterization that showed sequential 50% mid and proximal LAD stenosis.  There was a 99 stenosis of the proximal segment of circumflex artery.  The right coronary artery had 70% stenosis with recanalization and dissection with a 99% distal stenosis endocrine 100% occlusion the posterior lateral branch with left to right filling collaterals.  PCI was attempted to distal right coronary artery stenosis but unsuccessful.  Medical therapy was recommended and nitrates were added.  He was instructed not to utilize Cialis.  Statin therapy was switched to Lipitor.  Subsequent echocardiogram showed an ejection fraction of 55 to 60% with normal diastolic function no significant valvular heart disease or pulmonary hypertension.      /Last visit patient is not though less in the past several treating sleep apnea.  He is walking extensively at work weeks..  He is a park supervisor.  He denies any chest pain, shortness of breath, palpitations, syncope near syncope.    Past Medical  History:   Diagnosis Date    Bloody stool 04/2017    CAD (coronary artery disease)     CABG IN THE PAST    Colon polyps     FOLLOWED BY DR. BIANKA TANG    Colonic mass 05/25/2017    SESSILE SERRATED ADENOMA    Diaphoresis     Diplopia     Essential hypertension     GI hemorrhage 02/09/2017    ADMITTED TO [facility]    H. pylori infection 06/2017    [facility] in [place]    Hyperlipidemia     Hypertension     Myocardial infarction 05/25/2009    INFERIOR MI, ADMITTED TO [facility]    Nausea     Precordial pain 02/2017    PVC (premature ventricular contraction)     PVD (peripheral vascular disease)     Renal cancer 03/08/2017    LEFT, S/P NEPHRECTOMY, FOLLOWED BY DR. LA IYER    SOB (shortness of breath)      Past Surgical History:   Procedure Laterality Date    AMPUTATION DIGIT Right 04/09/2017    RIGHT RING FINGER    CARDIAC CATHETERIZATION N/A 2/10/2017    Procedure: Left Heart Cath;  Surgeon: Leland Carpio MD;  Location: [facility] CATH INVASIVE LOCATION;  Service:     CARDIAC CATHETERIZATION N/A 2009    DR.MINI CALL    CARDIAC CATHETERIZATION N/A 08/26/2015    DR.WILLIAM PETERSON    CARDIAC CATHETERIZATION N/A 06/28/2012    DR.WILLIAM PETERSON    CARDIAC CATHETERIZATION N/A 7/6/2021    Procedure: Left Heart Cath NO LV;  Surgeon: Александр Shelton MD;  Location: [facility] CATH INVASIVE LOCATION;  Service: Cardiovascular;  Laterality: N/A;  no LV gram    CARDIAC CATHETERIZATION N/A 7/6/2021    Procedure: Coronary angiography;  Surgeon: Александр Shelton MD;  Location: [facility] CATH INVASIVE LOCATION;  Service: Cardiovascular;  Laterality: N/A;    CARDIAC CATHETERIZATION  7/6/2021    Procedure: Saphenous Vein Graft;  Surgeon: Александр Shelton MD;  Location: [facility] CATH INVASIVE LOCATION;  Service: Cardiovascular;;    COLON RESECTION N/A 5/25/2017    Procedure: LAPAROSCOPIC SIGMOID COLON RESECTION, MOBILIZATION OF SPLENIC FLEXURE;  Surgeon: Bianka Tang MD;  Location: [facility] MAIN OR;  Service:     COLONOSCOPY N/A 4/3/2017    MEDIUM  INTERNAL HEMORRHOIDS, DIVERTICULOSIS IN SIGMOID, 4 MM TUBULAR ADENOMA POLYP IN TRANSVERSE, 5 MM HYPERPLASTIC POLYP IN TRANSVERSE, NODULAR MUCOSA IN RECTUM, 4 HYPERPLASTIC POLYPS IN RECTUM, DR. FLORIN PERKINS AT [facility]    COLONOSCOPY N/A 03/03/2015    COLONOSCOPY W/ POLYPECTOMY N/A 08/06/2012    POLYPS REMOVED    CORONARY ARTERY BYPASS GRAFT N/A 05/25/2009    3 VESSEL, DONE AT [place]    INGUINAL HERNIA REPAIR Left 12/29/2016    DR. LUCY KENNY AT [facility]    NEPHRECTOMY Left 5/25/2017    Procedure: LAPAROSCOPIC LEFT NEPHRECTOMY ;  Surgeon: Dima Thompson MD;  Location: [facility] OR;  Service:     SHOULDER ARTHROSCOPY W/ ROTATOR CUFF REPAIR Left 06/20/2015    SIGMOIDOSCOPY N/A 4/21/2017    AREA OF DISTAL SIGMOID TATTOOED, AN ULCERATED POLYPOID MASS IN DISTAL SIGMOID, NO SPECIMENS COLLECTED, DR. BIANKA SAUNDERS AT [facility]    VASECTOMY N/A     HAD 2 SURGERIES     Outpatient Medications Prior to Visit   Medication Sig Dispense Refill    aspirin 81 MG tablet Take 1 tablet by mouth Daily. TO STOP 5 DAYS BEFORE SURGERY      atorvastatin (LIPITOR) 40 MG tablet Take 1 tablet by mouth Every Night.      folic acid (FOLVITE) 400 MCG tablet Take 1 tablet by mouth Daily.      isosorbide mononitrate (IMDUR) 30 MG 24 hr tablet Take 1 tablet by mouth Daily. 90 tablet 1    metoprolol succinate XL (Toprol XL) 50 MG 24 hr tablet Take 1 tablet by mouth Every Night. 90 tablet 1    nitroglycerin (NITROSTAT) 0.4 MG SL tablet Place 1 tablet under the tongue Every 5 (Five) Minutes As Needed for Chest Pain (Only if SBP Greater Than 100). Take no more than 3 doses in 15 minutes. 30 tablet 1    omeprazole (priLOSEC) 20 MG capsule Take 1 capsule by mouth Daily.       No facility-administered medications prior to visit.       Allergies as of 12/16/2024    (No Known Allergies)     Social History     Socioeconomic History    Marital status:    Tobacco Use    Smoking status: Former     Current packs/day: 0.00     Average packs/day: 2.0 packs/day for  "32.0 years (64.0 ttl pk-yrs)     Types: Cigarettes     Start date: 5/25/1977     Quit date: 5/25/2009     Years since quitting: 15.5     Passive exposure: Never    Smokeless tobacco: Never    Tobacco comments:     Daily caffeine use   Vaping Use    Vaping status: Never Used   Substance and Sexual Activity    Alcohol use: Yes     Comment: social drinker    Drug use: No    Sexual activity: Defer     Family History   Problem Relation Age of Onset    Heart attack Mother     Leukemia Mother     Cancer Mother     Heart attack Brother     Coronary artery disease Brother     Heart disease Brother     Deep vein thrombosis Cousin         with embolic death    Coronary artery disease Father     Heart disease Father     COPD Father     No Known Problems Sister     No Known Problems Brother      Review of Systems   Constitutional: Negative for chills, fever, weight gain and weight loss.   Cardiovascular:  Negative for leg swelling.   Respiratory:  Negative for cough, snoring and wheezing.    Hematologic/Lymphatic: Negative for bleeding problem. Does not bruise/bleed easily.   Skin:  Negative for color change.   Musculoskeletal:  Negative for falls, joint pain and myalgias.   Gastrointestinal:  Negative for melena.   Genitourinary:  Negative for hematuria.   Neurological:  Negative for excessive daytime sleepiness.   Psychiatric/Behavioral:  Negative for depression. The patient is not nervous/anxious.         Objective:     Vitals:    12/16/24 0853   BP: 148/82   Pulse: 52   Weight: 106 kg (233 lb)   Height: 177.8 cm (70\")     Body mass index is 33.43 kg/m².    Vitals reviewed.   Constitutional:       Appearance: Well-developed. Obese.   Eyes:      General: No scleral icterus.        Right eye: No discharge.      Conjunctiva/sclera: Conjunctivae normal.      Pupils: Pupils are equal, round, and reactive to light.   HENT:      Head: Normocephalic.      Nose: Nose normal.   Neck:      Thyroid: No thyromegaly.      Vascular: No JVD. "   Pulmonary:      Effort: Pulmonary effort is normal. No respiratory distress.      Breath sounds: Normal breath sounds. No wheezing. No rales.   Cardiovascular:      Normal rate. Regular rhythm. Normal S1. Normal S2.       Murmurs: There is no murmur.      No gallop.    Pulses:     Intact distal pulses.      Carotid: 2+ bilaterally.     Radial: 2+ bilaterally.     Femoral: 2+ bilaterally.     Popliteal: 2+ bilaterally.     Dorsalis pedis: 2+ bilaterally.     Posterior tibial: 2+ bilaterally.  Edema:     Peripheral edema absent.   Abdominal:      General: Bowel sounds are normal. There is no distension.      Palpations: Abdomen is soft.      Tenderness: There is no abdominal tenderness. There is no rebound.   Musculoskeletal: Normal range of motion.         General: No tenderness.      Cervical back: Normal range of motion and neck supple. Skin:     General: Skin is warm and dry.      Findings: No erythema or rash.   Neurological:      Mental Status: Alert and oriented to person, place, and time.   Psychiatric:         Behavior: Behavior normal.         Thought Content: Thought content normal.         Judgment: Judgment normal.       Lab Review:             ECG 12 Lead    Date/Time: 12/16/2024 9:09 AM  Performed by: Hina Charles MD    Authorized by: Hina Charles MD  Comparison: compared with previous ECG   Similar to previous ECG  Rhythm: sinus bradycardia  Conduction: non-specific intraventricular conduction delay    Clinical impression: abnormal EKG            Diagnosis Plan   1. Primary hypertension  ECG 12 Lead    Vascular Screening (Bundle) CAR      2. Mixed hyperlipidemia  ECG 12 Lead    Vascular Screening (Bundle) CAR      3. Coronary artery disease involving native coronary artery of native heart without angina pectoris          Plan:       1.  Coronary artery disease with CABG 2009.  Cardiac cath with attempted PCI 7/2021 with disease progression the right coronary artery and modest to progression in the  LAD.  No anginal symptoms.  Continue risk factor modification  2.  Hypertension.  Pressures at home remain elevated.  He will try to pursue low-cholesterol more aggressive low-salt diet and increase his activity and call with additional readings in 2 to 3 weeks  3.  Dyslipidemia, followed by Dr. Jones.  Goal LDL less than 70, HDL greater than 40, triglycerides less than 150.  Lipids are to be checked at [place] daughter try to get a copy  4.  History of renal cell carcinoma, status post resection.  5.  Colonic polyps.  6.  History of borderline elevated glucose.  As above  7.  Vascular screening.  Order was placed for this.  8.  Erectile dysfunction.  Reportedly previously not utilizing Cialis  10.  Untreated sleep apnea.  Unfortunately not pursuing treatment of this.        Time Spent: I spent 35 minutes caring for Kev on this date of service. This time includes time spent by me in the following activities: preparing for the visit, reviewing tests, obtaining and/or reviewing a separately obtained history, performing a medically appropriate examination and/or evaluation, counseling and educating the patient/family/caregiver, ordering medications, tests, or procedures, documenting information in the medical record, and independently interpreting results and communicating that information with the patient/family/caregiver.   I spent 1 minutes on the separately reported service of ECG. This time is not included in the time used to support the E/M service also reported today.        Your medication list            Accurate as of December 16, 2024 11:59 PM. If you have any questions, ask your nurse or doctor.                CONTINUE taking these medications        Instructions Last Dose Given Next Dose Due   aspirin 81 MG tablet      Take 1 tablet by mouth Daily. TO STOP 5 DAYS BEFORE SURGERY       atorvastatin 40 MG tablet  Commonly known as: LIPITOR      Take 1 tablet by mouth Every Night.       folic acid 400 MCG  tablet  Commonly known as: FOLVITE      Take 1 tablet by mouth Daily.       isosorbide mononitrate 30 MG 24 hr tablet  Commonly known as: IMDUR      Take 1 tablet by mouth Daily.       metoprolol succinate XL 50 MG 24 hr tablet  Commonly known as: Toprol XL      Take 1 tablet by mouth Every Night.       nitroglycerin 0.4 MG SL tablet  Commonly known as: NITROSTAT      Place 1 tablet under the tongue Every 5 (Five) Minutes As Needed for Chest Pain (Only if SBP Greater Than 100). Take no more than 3 doses in 15 minutes.       omeprazole 20 MG capsule  Commonly known as: priLOSEC      Take 1 capsule by mouth Daily.                Patient is no longer taking -.  I corrected the med list to reflect this.  I did not stop these medications.      Dictated utilizing Dragon dictation

## 2024-12-16 ENCOUNTER — OFFICE VISIT (OUTPATIENT)
Dept: CARDIOLOGY | Facility: CLINIC | Age: 64
End: 2024-12-16
Payer: COMMERCIAL

## 2024-12-16 ENCOUNTER — TELEPHONE (OUTPATIENT)
Dept: CARDIOLOGY | Facility: CLINIC | Age: 64
End: 2024-12-16

## 2024-12-16 VITALS
WEIGHT: 233 LBS | HEART RATE: 52 BPM | HEIGHT: 70 IN | SYSTOLIC BLOOD PRESSURE: 148 MMHG | BODY MASS INDEX: 33.36 KG/M2 | DIASTOLIC BLOOD PRESSURE: 82 MMHG

## 2024-12-16 DIAGNOSIS — I25.10 CORONARY ARTERY DISEASE INVOLVING NATIVE CORONARY ARTERY OF NATIVE HEART WITHOUT ANGINA PECTORIS: ICD-10-CM

## 2024-12-16 DIAGNOSIS — E78.2 MIXED HYPERLIPIDEMIA: ICD-10-CM

## 2024-12-16 DIAGNOSIS — I10 PRIMARY HYPERTENSION: Primary | ICD-10-CM

## 2024-12-16 PROCEDURE — 99214 OFFICE O/P EST MOD 30 MIN: CPT | Performed by: INTERNAL MEDICINE

## 2024-12-16 PROCEDURE — 93000 ELECTROCARDIOGRAM COMPLETE: CPT | Performed by: INTERNAL MEDICINE

## (undated) DEVICE — CANN NASL CO2 TRULINK W/O2 A/

## (undated) DEVICE — ENDOCUT SCISSOR TIP, DISPOSABLE: Brand: RENEW

## (undated) DEVICE — PK CATH CARD 40

## (undated) DEVICE — GW EMR FIX EXCHG J STD .035 3MM 260CM

## (undated) DEVICE — ENDOPATH PNEUMONEEDLE INSUFFLATION NEEDLES WITH LUER LOCK CONNECTORS 120MM: Brand: ENDOPATH

## (undated) DEVICE — ELECTRD BLD EXT EDGE 1P COAT 6.5IN STRL

## (undated) DEVICE — RADIFOCUS OPTITORQUE ANGIOGRAPHIC CATHETER: Brand: OPTITORQUE

## (undated) DEVICE — GLV SURG SENSICARE GREEN W/ALOE PF LF 7 STRL

## (undated) DEVICE — STERILE LATEX POWDER-FREE SURGICAL GLOVESWITH NITRILE COATING: Brand: PROTEXIS

## (undated) DEVICE — ENDOPATH XCEL BLADELESS TROCARS WITH STABILITY SLEEVES: Brand: ENDOPATH XCEL

## (undated) DEVICE — GLIDESHEATH BASIC HYDROPHILIC COATED INTRODUCER SHEATH: Brand: GLIDESHEATH

## (undated) DEVICE — GLV SURG BIOGEL LTX PF 7 1/2

## (undated) DEVICE — Device: Brand: DEFENDO AIR/WATER/SUCTION AND BIOPSY VALVE

## (undated) DEVICE — ENDOPATH ETS-FLEX45 ARTICULATING ENDOSCOPIC LINEAR CUTTER, NO RELOAD: Brand: ENDOPATH

## (undated) DEVICE — TOTAL TRAY, 16FR 10ML SIL FOLEY, URN: Brand: MEDLINE

## (undated) DEVICE — SUT ETHLN 2/0 30IN 628H

## (undated) DEVICE — APPL CHLORAPREP W/TINT 26ML ORNG

## (undated) DEVICE — DRAPE,UTILITY,TAPE,15X26,STERILE: Brand: MEDLINE

## (undated) DEVICE — MARKR SPOT ENDOSCOPIC LESION INJ

## (undated) DEVICE — TUBING, SUCTION, 1/4" X 10', STRAIGHT: Brand: MEDLINE

## (undated) DEVICE — ROUND SHAPE BALLOON: Brand: PDB

## (undated) DEVICE — SUT VIC 0 TIES 18IN J912G

## (undated) DEVICE — SUT VIC 0 CT1 36IN J946H

## (undated) DEVICE — GLV SURG BIOGEL LTX PF 8

## (undated) DEVICE — SINGLE-USE BIOPSY FORCEPS: Brand: RADIAL JAW 4

## (undated) DEVICE — HARMONIC ACE +7 LAPAROSCOPIC SHEARS ADVANCED HEMOSTASIS 5MM DIAMETER 36CM SHAFT LENGTH  FOR USE WITH GRAY HAND PIECE ONLY: Brand: HARMONIC ACE

## (undated) DEVICE — WOUND RETRACTOR AND PROTECTOR: Brand: ALEXIS WOUND PROTECTOR-RETRACTOR

## (undated) DEVICE — DISPOSABLE GRASPER CARTRIDGE: Brand: DIRECT DRIVE REPOSABLE GRASPERS

## (undated) DEVICE — VISUALIZATION SYSTEM: Brand: CLEARIFY

## (undated) DEVICE — THE SINGLE USE ETRAP – POLYP TRAP IS USED FOR SUCTION RETRIEVAL OF ENDOSCOPICALLY REMOVED POLYPS.: Brand: ETRAP

## (undated) DEVICE — THE CARR-LOCKE INJECTION NEEDLE IS A SINGLE USE, DISPOSABLE, FLEXIBLE SHEATH INJECTION NEEDLE USED FOR THE INJECTION OF VARIOUS TYPES OF MEDIA THROUGH FLEXIBLE ENDOSCOPES.

## (undated) DEVICE — FINECROSS MG CORONARY MICRO-GUIDE CATHETER: Brand: FINECROSS

## (undated) DEVICE — BLUNT TIP TROCAR: Brand: AUTO SUTURE

## (undated) DEVICE — 3M™ IOBAN™ 2 ANTIMICROBIAL INCISE DRAPE 6648EZ: Brand: IOBAN™ 2

## (undated) DEVICE — GLV SURG BIOGEL LTX PF 7

## (undated) DEVICE — PENCL E/S HNDSWCH ROCKR CB

## (undated) DEVICE — LAPAROSCOPIC GAS CONDITIONING DEVICE.: Brand: INSUFLOW

## (undated) DEVICE — Device: Brand: PROWATER

## (undated) DEVICE — SUT VIC 2/0 UR6 27IN J602H

## (undated) DEVICE — SUT MNCRYL PLS ANTIB UD 4/0 PS2 18IN

## (undated) DEVICE — THE TORRENT IRRIGATION SCOPE CONNECTOR IS USED WITH THE TORRENT IRRIGATION TUBING TO PROVIDE IRRIGATION FLUIDS SUCH AS STERILE WATER DURING GASTROINTESTINAL ENDOSCOPIC PROCEDURES WHEN USED IN CONJUNCTION WITH AN IRRIGATION PUMP (OR ELECTROSURGICAL UNIT).: Brand: TORRENT

## (undated) DEVICE — CATH DIAG IMPULSE FL3.5 5F 100CM

## (undated) DEVICE — CATH VENT MIV RADL PIG ST TIP 5F 110CM

## (undated) DEVICE — CATH DIAG IMPULSE FR4 5F 100CM

## (undated) DEVICE — 3M™ STERI-STRIP™ REINFORCED ADHESIVE SKIN CLOSURES, R1546, 1/4 IN X 4 IN (6 MM X 100 MM), 10 STRIPS/ENVELOPE: Brand: 3M™ STERI-STRIP™

## (undated) DEVICE — INTENDED FOR TISSUE SEPARATION, AND OTHER PROCEDURES THAT REQUIRE A SHARP SURGICAL BLADE TO PUNCTURE OR CUT.: Brand: BARD-PARKER ® CARBON RIB-BACK BLADES

## (undated) DEVICE — ST EXT IV SMARTSITE 2PC M LL 2.8ML 20IN

## (undated) DEVICE — SIGMOIDOSCOPIC SUCTION INSTRUMENT 18 FR W/WINGED CAP CONTROL AND 6 FOOT (1.8M) TUBING: Brand: SIGMOIDOSCOPIC

## (undated) DEVICE — GLV SURG SENSICARE ALOE LF PF SZ7.5 GRN

## (undated) DEVICE — CANNULA,ADULT,SOFT-TOUCH,7TUBE,SC: Brand: MEDLINE

## (undated) DEVICE — SOL NACL 0.9PCT 1000ML

## (undated) DEVICE — SPNG GZ WOVN 4X4IN 12PLY 10/BX STRL

## (undated) DEVICE — ADHS LIQ MASTISOL 2/3ML

## (undated) DEVICE — TUBING, SUCTION, 1/4" X 20', STRAIGHT: Brand: MEDLINE INDUSTRIES, INC.

## (undated) DEVICE — RUNTHROUGH NS EXTRA FLOPPY PTCA GUIDEWIRE: Brand: RUNTHROUGH

## (undated) DEVICE — VIOLET BRAIDED (POLYGLACTIN 910), SYNTHETIC ABSORBABLE SUTURE: Brand: COATED VICRYL

## (undated) DEVICE — BARRIER, ABSORBABLE, ADHESION: Brand: SEPRAFILM®

## (undated) DEVICE — SMARTGOWN SURGICAL GOWN, XL: Brand: CONVERTORS

## (undated) DEVICE — LOU LAP CHOLE: Brand: MEDLINE INDUSTRIES, INC.

## (undated) DEVICE — ANTIBACTERIAL UNDYED BRAIDED (POLYGLACTIN 910), SYNTHETIC ABSORBABLE SUTURE: Brand: COATED VICRYL

## (undated) DEVICE — LAPAROSCOPIC SMOKE ELIMINATION DEVICE: Brand: PNEUVIEW XE

## (undated) DEVICE — TR BAND RADIAL ARTERY COMPRESSION DEVICE: Brand: TR BAND

## (undated) DEVICE — 3M™ STERI-STRIP™ COMPOUND BENZOIN TINCTURE 40 BAGS/CARTON 4 CARTONS/CASE C1544: Brand: 3M™ STERI-STRIP™

## (undated) DEVICE — KT MANIFLD CARDIAC

## (undated) DEVICE — 6F .070 JR 4 100CM: Brand: CORDIS

## (undated) DEVICE — SNAR POLYP SENSATION STDOVL 27 240 BX40

## (undated) DEVICE — DEV INDEFLATOR P/N 580289

## (undated) DEVICE — MARKR SKIN W/RULR AND LBL

## (undated) DEVICE — TROCAR: Brand: KII OPTICAL ACCESS SYSTEM

## (undated) DEVICE — SUT VIC 2/0 TIES 18IN J111T

## (undated) DEVICE — TROCAR: Brand: KII SLEEVE

## (undated) DEVICE — SPNG LAP 18X18IN LF STRL PK/5

## (undated) DEVICE — LOU LAP SIGMOID COLON: Brand: MEDLINE INDUSTRIES, INC.

## (undated) DEVICE — SUT PDS 3/0 SH 27IN DYED Z316H

## (undated) DEVICE — UNDYED BRAIDED (POLYGLACTIN 910), SYNTHETIC ABSORBABLE SUTURE: Brand: COATED VICRYL

## (undated) DEVICE — SKIN AFFIX SURG ADHESIVE 72/CS 0.55ML: Brand: MEDLINE

## (undated) DEVICE — 1842 FOAM BLOCK NEEDLE COUNTER: Brand: DEVON

## (undated) DEVICE — GLIDESHEATH SLENDER STAINLESS STEEL KIT: Brand: GLIDESHEATH SLENDER

## (undated) DEVICE — Device: Brand: TISSUE RETRIEVAL SYSTEM